# Patient Record
Sex: FEMALE | Race: WHITE | Employment: OTHER | URBAN - METROPOLITAN AREA
[De-identification: names, ages, dates, MRNs, and addresses within clinical notes are randomized per-mention and may not be internally consistent; named-entity substitution may affect disease eponyms.]

---

## 2017-02-06 ENCOUNTER — OFFICE VISIT (OUTPATIENT)
Dept: INTERNAL MEDICINE CLINIC | Age: 47
End: 2017-02-06

## 2017-02-06 VITALS
DIASTOLIC BLOOD PRESSURE: 79 MMHG | OXYGEN SATURATION: 97 % | HEART RATE: 83 BPM | SYSTOLIC BLOOD PRESSURE: 132 MMHG | WEIGHT: 146.5 LBS | RESPIRATION RATE: 14 BRPM | TEMPERATURE: 98.3 F | HEIGHT: 64 IN | BODY MASS INDEX: 25.01 KG/M2

## 2017-02-06 DIAGNOSIS — F41.1 GENERALIZED ANXIETY DISORDER: ICD-10-CM

## 2017-02-06 DIAGNOSIS — R20.0 NUMBNESS AND TINGLING IN BOTH HANDS: Primary | ICD-10-CM

## 2017-02-06 DIAGNOSIS — E03.9 HYPOTHYROIDISM, UNSPECIFIED TYPE: ICD-10-CM

## 2017-02-06 DIAGNOSIS — R20.2 NUMBNESS AND TINGLING IN BOTH HANDS: Primary | ICD-10-CM

## 2017-02-06 DIAGNOSIS — R29.898 HAND WEAKNESS: ICD-10-CM

## 2017-02-06 DIAGNOSIS — Z23 ENCOUNTER FOR IMMUNIZATION: ICD-10-CM

## 2017-02-06 RX ORDER — ALBUTEROL SULFATE 90 UG/1
1 AEROSOL, METERED RESPIRATORY (INHALATION)
Qty: 1 INHALER | Refills: 2 | Status: SHIPPED | OUTPATIENT
Start: 2017-02-06 | End: 2020-03-03 | Stop reason: SDUPTHER

## 2017-02-06 RX ORDER — BUDESONIDE AND FORMOTEROL FUMARATE DIHYDRATE 160; 4.5 UG/1; UG/1
2 AEROSOL RESPIRATORY (INHALATION) 2 TIMES DAILY
Qty: 1 INHALER | Refills: 3 | Status: SHIPPED | OUTPATIENT
Start: 2017-02-06 | End: 2017-05-02

## 2017-02-06 NOTE — PROGRESS NOTES
Reviewed record In preparation for visit and have obtained necessary documentation. Given info on advanced directives. 1. Have you been to the ER, urgent care clinic or hospitalized since your last visit? No     2. Have you seen or consulted any other health care providers outside of the [facility] since your last visit? Include any pap smears or colon screening. [facility] reviewed: After verbal order read back of , patient received Flu Shot in right arm,  UlNancy Davalos 47 [phone] Lot TR9AL Exp 6/30/17. Patient tolerated procedure without complaints and received VIS.

## 2017-02-06 NOTE — MR AVS SNAPSHOT
Visit Information Date & Time Provider Department Dept. Phone Encounter #  
 2/6/2017  9:15 AM Zoelisa BaroneBelinda [phone] [id number] Upcoming Health Maintenance Date Due INFLUENZA AGE 9 TO ADULT 8/1/2016 PAP AKA CERVICAL CYTOLOGY 5/18/2018 DTaP/Tdap/Td series (2 - Td) 8/1/2018 Allergies as of 2/6/2017  Review Complete On: 2/6/2017 By: Zoe Barone MD  
  
 Severity Noted Reaction Type Reactions Hydrocodone  03/10/2010    Hives Current Immunizations  Reviewed on 2/6/2017 Name Date H1N1 FLU VACCINE 3/10/2010 Influenza Vaccine 9/1/2014, 8/21/2013 Influenza Vaccine (Quad) PF  Incomplete Pneumococcal Vaccine (Unspecified Type) 3/10/2010 TDAP Vaccine 8/1/2008 Reviewed by Anahi Martin LPN on 8/8/5666 at  2:77 AM  
 Reviewed by Anahi Martin LPN on 5/4/5380 at  5:30 AM  
You Were Diagnosed With   
  
 Codes Comments Numbness and tingling in both hands    -  Primary ICD-10-CM: R20.2 ICD-9-CM: 782.0 Hand weakness     ICD-10-CM: M62.81 ICD-9-CM: 728.87 Encounter for immunization     ICD-10-CM: Q89 ICD-9-CM: V03.89 Vitals BP Pulse Temp Resp Height(growth percentile) Weight(growth percentile) 132/79 (BP 1 Location: Left arm, BP Patient Position: Sitting) 83 98.3 °F (36.8 °C) (Oral) 14 5' 4\" (1.626 m) 146 lb 8 oz (66.5 kg) LMP SpO2 BMI OB Status Smoking Status 01/16/2017 (Approximate) 97% 25.15 kg/m2 Having regular periods Current Every Day Smoker BMI and BSA Data Body Mass Index Body Surface Area  
 25.15 kg/m 2 1.73 m 2 Preferred Pharmacy Pharmacy Name Phone [id number] [address] [place] [address] [phone] Your Updated Medication List  
  
   
This list is accurate as of: 2/6/17  9:51 AM.  Always use your most recent med list.  
  
  
  
  
 albuterol 90 mcg/actuation inhaler Commonly known as:  PROVENTIL HFA, VENTOLIN HFA, PROAIR HFA Take 1 Puff by inhalation every four (4) hours as needed for Wheezing. BENADRYL 25 mg capsule Generic drug:  diphenhydrAMINE Take 25 mg by mouth every six (6) hours as needed. budesonide-formoterol 160-4.5 mcg/actuation HFA inhaler Commonly known as:  SYMBICORT Take 2 Puffs by inhalation two (2) times a day. CLARITIN 10 mg tablet Generic drug:  loratadine Take 10 mg by mouth daily. * levothyroxine 50 mcg tablet Commonly known as:  SYNTHROID Take 1 Tab by mouth Daily (before breakfast). Take with 200mcg for 250mcg daily * levothyroxine 200 mcg tablet Commonly known as:  SYNTHROID Take 1 Tab by mouth Daily (before breakfast). Take with 50mcg daily for total 250mcg daily MOTRIN  mg tablet Generic drug:  ibuprofen Take  by mouth. MUCINEX 600 mg ER tablet Generic drug:  guaiFENesin ER Take 600 mg by mouth two (2) times a day. PARoxetine 40 mg tablet Commonly known as:  PAXIL TAKE 1 TABLET BY MOUTH DAILY. TYLENOL 325 mg tablet Generic drug:  acetaminophen Take  by mouth every four (4) hours as needed for Pain. * Notice: This list has 2 medication(s) that are the same as other medications prescribed for you. Read the directions carefully, and ask your doctor or other care provider to review them with you. Prescriptions Sent to Pharmacy Refills  
 budesonide-formoterol (SYMBICORT) 160-4.5 mcg/actuation HFA inhaler 3 Sig: Take 2 Puffs by inhalation two (2) times a day. Class: Normal  
 Pharmacy: [facility] #[id number] [address],  [address] Ph #: [phone] Route: Inhalation  
 albuterol (PROVENTIL HFA, VENTOLIN HFA, PROAIR HFA) 90 mcg/actuation inhaler 2 Sig: Take 1 Puff by inhalation every four (4) hours as needed for Wheezing.   
 Class: Normal  
 Pharmacy: [facility] #[id number] Estephanie Bertrand, [address]  #: [phone] Route: Inhalation We Performed the Following INFLUENZA VIRUS VAC QUAD,SPLIT,PRESV FREE SYRINGE 3/> YRS IM [id number] CPT(R)] MT IMMUNIZ ADMIN,1 SINGLE/COMB VAC/TOXOID [id number] CPT(R)] To-Do List   
 02/07/2017 Neurology:  EMG TWO EXTREMITIES UPPER   
  
 02/07/2017 Neurology:  NCV SENSORY OR MIXED Referral Information Referral ID Referred By Referred To  
  
 [id number] Major Moore Not Available Visits Status Start Date End Date 1 New Request 2/6/17 2/6/18 If your referral has a status of pending review or denied, additional information will be sent to support the outcome of this decision. Referral ID Referred By Referred To  
 [id number] [facility] Not Available Visits Status Start Date End Date 1 New Request 2/6/17 2/6/18 If your referral has a status of pending review or denied, additional information will be sent to support the outcome of this decision. Patient Instructions Get EMG and nerve conduction testing. That will help us localize where the problem is and guide the next step. Vaccine Information Statement Influenza (Flu) Vaccine (Inactivated or Recombinant): What you need to know Many Vaccine Information Statements are available in Malay and other languages. See www.immunize.org/vis Hojas de Información Sobre Vacunas están disponibles en Español y en muchos otros idiomas. Visite www.immunize.org/vis 1. Why get vaccinated? Influenza (flu) is a contagious disease that spreads around the [place] every year, usually between October and May. Flu is caused by influenza viruses, and is spread mainly by coughing, sneezing, and close contact. Anyone can get flu. Flu strikes suddenly and can last several days. Symptoms vary by age, but can include: 
 fever/chills  sore throat  muscle aches  fatigue  cough  headache  runny or stuffy nose Flu can also lead to pneumonia and blood infections, and cause diarrhea and seizures in children. If you have a medical condition, such as heart or lung disease, flu can make it worse. Flu is more dangerous for some people. Infants and young children, people 72years of age and older, pregnant women, and people with certain health conditions or a weakened immune system are at greatest risk. Each year thousands of people in the [facility] die from flu, and many more are hospitalized. Flu vaccine can: 
 keep you from getting flu, 
 make flu less severe if you do get it, and 
 keep you from spreading flu to your family and other people. 2. Inactivated and recombinant flu vaccines A dose of flu vaccine is recommended every flu season. Children 6 months through 6years of age may need two doses during the same flu season. Everyone else needs only one dose each flu season. Some inactivated flu vaccines contain a very small amount of a mercury-based preservative called thimerosal. Studies have not shown thimerosal in vaccines to be harmful, but flu vaccines that do not contain thimerosal are available. There is no live flu virus in flu shots. They cannot cause the flu. There are many flu viruses, and they are always changing. Each year a new flu vaccine is made to protect against three or four viruses that are likely to cause disease in the upcoming flu season. But even when the vaccine doesnt exactly match these viruses, it may still provide some protection Flu vaccine cannot prevent: 
 flu that is caused by a virus not covered by the vaccine, or 
 illnesses that look like flu but are not. It takes about 2 weeks for protection to develop after vaccination, and protection lasts through the flu season. 3. Some people should not get this vaccine Tell the person who is giving you the vaccine:  If you have any severe, life-threatening allergies. If you ever had a life-threatening allergic reaction after a dose of flu vaccine, or have a severe allergy to any part of this vaccine, you may be advised not to get vaccinated. Most, but not all, types of flu vaccine contain a small amount of egg protein.  If you ever had Guillain-Barré Syndrome (also called GBS). Some people with a history of GBS should not get this vaccine. This should be discussed with your doctor.  If you are not feeling well. It is usually okay to get flu vaccine when you have a mild illness, but you might be asked to come back when you feel better. 4. Risks of a vaccine reaction With any medicine, including vaccines, there is a chance of reactions. These are usually mild and go away on their own, but serious reactions are also possible. Most people who get a flu shot do not have any problems with it. Minor problems following a flu shot include:  
 soreness, redness, or swelling where the shot was given  hoarseness  sore, red or itchy eyes  cough  fever  aches  headache  itching  fatigue If these problems occur, they usually begin soon after the shot and last 1 or 2 days. More serious problems following a flu shot can include the following:  There may be a small increased risk of Guillain-Barré Syndrome (GBS) after inactivated flu vaccine. This risk has been estimated at 1 or 2 additional cases per million people vaccinated. This is much lower than the risk of severe complications from flu, which can be prevented by flu vaccine.  Young children who get the flu shot along with pneumococcal vaccine (PCV13) and/or DTaP vaccine at the same time might be slightly more likely to have a seizure caused by fever. Ask your doctor for more information. Tell your doctor if a child who is getting flu vaccine has ever had a seizure. Problems that could happen after any injected vaccine:  People sometimes faint after a medical procedure, including vaccination. Sitting or lying down for about 15 minutes can help prevent fainting, and injuries caused by a fall. Tell your doctor if you feel dizzy, or have vision changes or ringing in the ears.  Some people get severe pain in the shoulder and have difficulty moving the arm where a shot was given. This happens very rarely.  Any medication can cause a severe allergic reaction. Such reactions from a vaccine are very rare, estimated at about 1 in a million doses, and would happen within a few minutes to a few hours after the vaccination. As with any medicine, there is a very remote chance of a vaccine causing a serious injury or death. The safety of vaccines is always being monitored. For more information, visit: www.cdc.gov/vaccinesafety/ 
 
 
6. The National Vaccine Injury Compensation Program 
 
The [facility] Vaccine Injury Compensation Program (VICP) is a federal program that was created to compensate people who may have been injured by certain vaccines. Persons who believe they may have been injured by a vaccine can learn about the program and about filing a claim by calling 4-264.977.9944 or visiting the [employer] website at www.[facility].gov/vaccinecompensation. There is a time limit to file a claim for compensation. 7. How can I learn more?  Ask your healthcare provider. He or she can give you the vaccine package insert or suggest other sources of information.  Call your local or state health department.  Contact the Centers for Disease Control and Prevention (CDC): 
- Call 1-668.986.8679 (6-660-FXT-INFO) or 
- Visit CDCs website at www.cdc.gov/flu Vaccine Information Statement Inactivated Influenza Vaccine 8/7/2015 
42 UNancy Newell [id number] [facility] and [employer] Centers for Disease Control and Prevention Office Use Only Introducing [facility]! Dear Shae Dillon: Thank you for requesting a [employer] account. Our records indicate that you have previously registered for a [employer] account but its currently inactive. Please call our [employer] support line at [phone]. Additional Information If you have questions, please visit the Frequently Asked Questions section of the [employer] website at [email or web address] Remember, [employer] is NOT to be used for urgent needs. For medical emergencies, dial 911. Now available from your iPhone and Android! Please provide this summary of care documentation to your next provider. Your primary care clinician is listed as Lloyd Freeman. If you have any questions after today's visit, please call [phone].

## 2017-02-06 NOTE — PATIENT INSTRUCTIONS
Get EMG and nerve conduction testing. That will help us localize where the problem is and guide the next step. Vaccine Information Statement    Influenza (Flu) Vaccine (Inactivated or Recombinant): What you need to know    Many Vaccine Information Statements are available in [place] and other languages. See www.immunize.org/vis  Hojas de Información Sobre Vacunas están disponibles en Español y en muchos otros idiomas. Visite www.immunize.org/vis    1. Why get vaccinated? Influenza (flu) is a contagious disease that spreads around the [place] every year, usually between October and May. Flu is caused by influenza viruses, and is spread mainly by coughing, sneezing, and close contact. Anyone can get flu. Flu strikes suddenly and can last several days. Symptoms vary by age, but can include:   fever/chills   sore throat   muscle aches   fatigue   cough   headache    runny or stuffy nose    Flu can also lead to pneumonia and blood infections, and cause diarrhea and seizures in children. If you have a medical condition, such as heart or lung disease, flu can make it worse. Flu is more dangerous for some people. Infants and young children, people 72years of age and older, pregnant women, and people with certain health conditions or a weakened immune system are at greatest risk. Each year thousands of people in the [facility] die from flu, and many more are hospitalized. Flu vaccine can:   keep you from getting flu,   make flu less severe if you do get it, and   keep you from spreading flu to your family and other people. 2. Inactivated and recombinant flu vaccines    A dose of flu vaccine is recommended every flu season. Children 6 months through 6years of age may need two doses during the same flu season. Everyone else needs only one dose each flu season.        Some inactivated flu vaccines contain a very small amount of a mercury-based preservative called thimerosal. Studies have not shown thimerosal in vaccines to be harmful, but flu vaccines that do not contain thimerosal are available. There is no live flu virus in flu shots. They cannot cause the flu. There are many flu viruses, and they are always changing. Each year a new flu vaccine is made to protect against three or four viruses that are likely to cause disease in the upcoming flu season. But even when the vaccine doesnt exactly match these viruses, it may still provide some protection    Flu vaccine cannot prevent:   flu that is caused by a virus not covered by the vaccine, or   illnesses that look like flu but are not. It takes about 2 weeks for protection to develop after vaccination, and protection lasts through the flu season. 3. Some people should not get this vaccine    Tell the person who is giving you the vaccine:     If you have any severe, life-threatening allergies. If you ever had a life-threatening allergic reaction after a dose of flu vaccine, or have a severe allergy to any part of this vaccine, you may be advised not to get vaccinated. Most, but not all, types of flu vaccine contain a small amount of egg protein.  If you ever had Guillain-Barré Syndrome (also called GBS). Some people with a history of GBS should not get this vaccine. This should be discussed with your doctor.  If you are not feeling well. It is usually okay to get flu vaccine when you have a mild illness, but you might be asked to come back when you feel better. 4. Risks of a vaccine reaction    With any medicine, including vaccines, there is a chance of reactions. These are usually mild and go away on their own, but serious reactions are also possible. Most people who get a flu shot do not have any problems with it.      Minor problems following a flu shot include:    soreness, redness, or swelling where the shot was given     hoarseness   sore, red or itchy eyes   cough   fever   aches   headache   itching   fatigue  If these problems occur, they usually begin soon after the shot and last 1 or 2 days. More serious problems following a flu shot can include the following:     There may be a small increased risk of Guillain-Barré Syndrome (GBS) after inactivated flu vaccine. This risk has been estimated at 1 or 2 additional cases per million people vaccinated. This is much lower than the risk of severe complications from flu, which can be prevented by flu vaccine.  Young children who get the flu shot along with pneumococcal vaccine (PCV13) and/or DTaP vaccine at the same time might be slightly more likely to have a seizure caused by fever. Ask your doctor for more information. Tell your doctor if a child who is getting flu vaccine has ever had a seizure. Problems that could happen after any injected vaccine:      People sometimes faint after a medical procedure, including vaccination. Sitting or lying down for about 15 minutes can help prevent fainting, and injuries caused by a fall. Tell your doctor if you feel dizzy, or have vision changes or ringing in the ears.  Some people get severe pain in the shoulder and have difficulty moving the arm where a shot was given. This happens very rarely.  Any medication can cause a severe allergic reaction. Such reactions from a vaccine are very rare, estimated at about 1 in a million doses, and would happen within a few minutes to a few hours after the vaccination. As with any medicine, there is a very remote chance of a vaccine causing a serious injury or death. The safety of vaccines is always being monitored. For more information, visit: www.cdc.gov/vaccinesafety/    5. What if there is a serious reaction? What should I look for?  Look for anything that concerns you, such as signs of a severe allergic reaction, very high fever, or unusual behavior.     Signs of a severe allergic reaction can include hives, swelling of the face and throat, difficulty breathing, a fast heartbeat, dizziness, and weakness - usually within a few minutes to a few hours after the vaccination. What should I do?  If you think it is a severe allergic reaction or other emergency that cant wait, call --1 and get the person to the nearest hospital. Otherwise, call your doctor.  Reactions should be reported to the Vaccine Adverse Event Reporting System (VAERS). Your doctor should file this report, or you can do it yourself through  the VAERS web site at www.vaers. [facility].gov, or by calling 0-163.929.3082. VAERS does not give medical advice. 6. The National Vaccine Injury Compensation Program    The [facility] Vaccine Injury Compensation Program (VICP) is a federal program that was created to compensate people who may have been injured by certain vaccines. Persons who believe they may have been injured by a vaccine can learn about the program and about filing a claim by calling 4-432.887.4857 or visiting the [employer] website at www.[employer].gov/vaccinecompensation. There is a time limit to file a claim for compensation. 7. How can I learn more?  Ask your healthcare provider. He or she can give you the vaccine package insert or suggest other sources of information.  Call your local or state health department.  Contact the Centers for Disease Control and Prevention (CDC):  - Call 6-609.990.8586 (1-800-CDC-INFO) or  - Visit CDCs website at www.cdc.gov/flu    Vaccine Information Statement   Inactivated Influenza Vaccine   2015  42 PAT Pichardo [id number]    Department of Health and Human Services  Centers for Disease Control and Prevention    Office Use Only

## 2017-02-06 NOTE — PROGRESS NOTES
HISTORY OF PRESENT ILLNESS  David Webb is a 52 y.o. female. HPI She complains of a 4-5 week history of tingling and burning in her fingers and hands but not thumbs. The burning and tingling are constant. She has decreased sensation in her fingers and hands. She finds it hard to hold onto things and to open bottle tops. She complains that her hands are sensitive to water and that it does not matter whether the water is warm or cool. She also has the burning and tingling in the ulnar aspect of the left forearm. She denies any other arm pain. She denies any pain or abnormal sensation in her legs or trunk  She has had pain in her neck for the past 2 years. The pain is worse on the right side of the neck than it is on the left there is been no history of injury. She sees a chiropractor intermittently who has told her that her neck curve is abnormal.  She does get some temporary relief of the pain after he treats her. She has history of hypothyroidism and is on an unusually high dose of levothyroxine especially taking into account that she is not significantly overweight. Currently she takes 250 µg daily. She has a history of generalized anxiety disorder as well. She takes paroxetine 40 mg to treat this.   Patient Active Problem List   Diagnosis Code    Hypothyroidism E03.9    Generalized anxiety disorder F41.1    COPD (chronic obstructive pulmonary disease) ([facility] J44.9    Tobacco use Z72.0     Past Medical History   Diagnosis Date    Allergic rhinitis     COPD (chronic obstructive pulmonary disease) ([facility]      mild    Generalized anxiety disorder     Hypothyroidism      Past Surgical History   Procedure Laterality Date    Hx tubal ligation       Social History     Social History    Marital status:      Spouse name: N/A    Number of children: N/A    Years of education: N/A     Social History Main Topics    Smoking status: Current Every Day Smoker     Packs/day: 1.50     Years: 20.00 Types: Cigarettes    Smokeless tobacco: Never Used      Comment: about 1ppd now 6/2/16    Alcohol use No    Drug use: None    Sexual activity: Yes     Other Topics Concern    None     Social History Narrative     Family History   Problem Relation Age of Onset    Diabetes Mother     Hypertension Mother     Heart Disease Father      Allergies   Allergen Reactions    Hydrocodone Hives     Current Outpatient Prescriptions   Medication Sig Dispense Refill    PARoxetine (PAXIL) 40 mg tablet TAKE 1 TABLET BY MOUTH DAILY. 30 Tab 2    levothyroxine (SYNTHROID) 200 mcg tablet Take 1 Tab by mouth Daily (before breakfast). Take with 50mcg daily for total 250mcg daily 30 Tab 3    levothyroxine (SYNTHROID) 50 mcg tablet Take 1 Tab by mouth Daily (before breakfast). Take with 200mcg for 250mcg daily 30 Tab 2    guaiFENesin ER (MUCINEX) 600 mg ER tablet Take 600 mg by mouth two (2) times a day.  ibuprofen (MOTRIN IB) 200 mg tablet Take  by mouth.  diphenhydrAMINE (BENADRYL) 25 mg capsule Take 25 mg by mouth every six (6) hours as needed.  acetaminophen (TYLENOL) 325 mg tablet Take  by mouth every four (4) hours as needed for Pain.  budesonide-formoterol (SYMBICORT) 160-4.5 mcg/actuation HFA inhaler Take 2 Puffs by inhalation two (2) times a day. 1 Inhaler 3    albuterol (PROVENTIL HFA, VENTOLIN HFA) 90 mcg/Actuation inhaler Take 1 Puff by inhalation.  loratadine (CLARITIN) 10 mg tablet Take 10 mg by mouth daily. ROS  Visit Vitals    /79 (BP 1 Location: Left arm, BP Patient Position: Sitting)    Pulse 83    Temp 98.3 °F (36.8 °C) (Oral)    Resp 14    Ht 5' 4\" (1.626 m)    Wt 146 lb 8 oz (66.5 kg)    LMP 01/16/2017 (Approximate)    SpO2 97%    BMI 25.15 kg/m2     Physical Exam   Constitutional: She is oriented to person, place, and time. She appears well-developed and well-nourished. HENT:   Head: Normocephalic and atraumatic. Neck: Neck supple. Musculoskeletal:        Cervical back: She exhibits normal range of motion, no tenderness, no bony tenderness, no deformity and no spasm. Right hand: She exhibits normal range of motion, no tenderness, no bony tenderness, normal capillary refill, no deformity and no swelling. Normal sensation noted. Decreased strength noted. She exhibits finger abduction and wrist extension trouble. She exhibits no thumb/finger opposition. Left hand: She exhibits normal range of motion, no tenderness, no bony tenderness, normal capillary refill, no deformity and no swelling. Normal sensation noted. Decreased strength noted. She exhibits finger abduction and wrist extension trouble. She exhibits no thumb/finger opposition. Neurological: She is alert and oriented to person, place, and time. Right and left: strength is 2-3/5 to finger flexion/extension, abduction, wrist flexion/extension, elbow flexion extension, all with questionable effort. 5/5 to apposition. Skin: Skin is warm, dry and intact. Psychiatric: She has a normal mood and affect. Her behavior is normal.   Nursing note and vitals reviewed. ASSESSMENT and PLAN    ICD-10-CM ICD-9-CM    1. Numbness and tingling in both hands R20.2 782.0 EMG TWO EXTREMITIES UPPER      NCV SENSORY OR MIXED   2. Hand weakness M62.81 728.87 EMG TWO EXTREMITIES UPPER      NCV SENSORY OR MIXED   3. Generalized anxiety disorder F41.1 300.02    4. Hypothyroidism, unspecified type E03.9 244.9    5. Encounter for immunization Z23 V03.89 [place] IMMUNIZ ADMIN,1 SINGLE/COMB VAC/TOXOID      INFLUENZA VIRUS VAC QUAD,SPLIT,PRESV FREE SYRINGE 3/> YRS IM         Numbness and tingling in both hands  While I doubt this represents a radiculopathy or a peripheral neuropathy, will obtain EMG nerve conduction to exclude both. The other thoughts might be that her symptoms are anxiety induced, or an early manifestation of a neurologic disorder such as multiple sclerosis.   If symptoms persist, remit and relapse, or she develops symptoms and another area of the nervous system, MRI of at least the cervical spine is indicated. -     EMG TWO EXTREMITIES UPPER; Future  -     NCV SENSORY OR MIXED; Future    Hand weakness  -     EMG TWO EXTREMITIES UPPER; Future  -     NCV SENSORY OR MIXED; Future    Generalized anxiety disorder    Hypothyroidism, unspecified type  I have to wonder if she misses doses of thyroid hormone explaining the relatively high dose of replacement that she requires. In fact in August 2014 her T4 level was normal in the face of a high TSH, suggesting missed doses. .  TSH has even been normal at times in 2011 and 2012 only to rebound to higher values when rechecked. .    Encounter for immunization  -     [place] IMMUNIZ ADMIN,1 SINGLE/COMB VAC/TOXOID  -     Influenza virus vaccine (QUADRIVALENT PRES FREE SYRINGE) IM 3 years and older    Other orders  -     budesonide-formoterol (SYMBICORT) 160-4.5 mcg/actuation HFA inhaler; Take 2 Puffs by inhalation two (2) times a day. -     albuterol (PROVENTIL HFA, VENTOLIN HFA, PROAIR HFA) 90 mcg/actuation inhaler; Take 1 Puff by inhalation every four (4) hours as needed for Wheezing. Follow-up Disposition: Not on File   Results of EMG nerve conduction tests will help guide the next step in evaluation. Whether positive or negative she may warrant an MRI of the C-spine. I have discussed the diagnosis with the patient and the intended plan as seen in the above orders. Patient is in agreement. The patient has received an after-visit summary and questions were answered concerning future plans. I have discussed medication side effects and warnings with the patient as well.

## 2017-03-27 ENCOUNTER — OFFICE VISIT (OUTPATIENT)
Dept: INTERNAL MEDICINE CLINIC | Age: 47
End: 2017-03-27

## 2017-03-27 VITALS
TEMPERATURE: 98.4 F | DIASTOLIC BLOOD PRESSURE: 78 MMHG | WEIGHT: 146.8 LBS | OXYGEN SATURATION: 98 % | HEART RATE: 74 BPM | HEIGHT: 64 IN | RESPIRATION RATE: 16 BRPM | SYSTOLIC BLOOD PRESSURE: 125 MMHG | BODY MASS INDEX: 25.06 KG/M2

## 2017-03-27 DIAGNOSIS — M79.641 PAIN IN BOTH HANDS: ICD-10-CM

## 2017-03-27 DIAGNOSIS — R20.0 NUMBNESS IN BOTH HANDS: ICD-10-CM

## 2017-03-27 DIAGNOSIS — M54.2 NECK PAIN: ICD-10-CM

## 2017-03-27 DIAGNOSIS — M79.642 PAIN IN BOTH HANDS: ICD-10-CM

## 2017-03-27 DIAGNOSIS — E03.9 HYPOTHYROIDISM, UNSPECIFIED TYPE: Primary | ICD-10-CM

## 2017-03-27 DIAGNOSIS — M62.542: ICD-10-CM

## 2017-03-27 DIAGNOSIS — R29.898 HAND WEAKNESS: ICD-10-CM

## 2017-03-27 DIAGNOSIS — M62.541: ICD-10-CM

## 2017-03-27 NOTE — PATIENT INSTRUCTIONS

## 2017-03-27 NOTE — PROGRESS NOTES
Patient received paperwork for advance directive during previous visit but has not completed at this time     Reviewed record In preparation for visit and have obtained necessary documentation      1. Have you been to the ER, urgent care clinic since your last visit? Hospitalized since your last visit? NO    2. Have you seen or consulted any other health care providers outside of the [address] [place] [address] since your last visit? Include any pap smears or colon screening.  NO

## 2017-03-27 NOTE — MR AVS SNAPSHOT
Visit Information Date & Time Provider Department Dept. Phone Encounter #  
 3/27/2017  3:45 PM Ulises Mandujano MD Michelle Ortiz [phone] [id number] Follow-up Instructions Return in about 2 months (around 5/27/2017) for thyroid. Upcoming Health Maintenance Date Due  
 PAP AKA CERVICAL CYTOLOGY 5/18/2018 DTaP/Tdap/Td series (2 - Td) 8/1/2018 Allergies as of 3/27/2017  Review Complete On: 3/27/2017 By: Ulises Mandujano MD  
  
 Severity Noted Reaction Type Reactions Hydrocodone  03/10/2010    Hives Current Immunizations  Reviewed on 2/6/2017 Name Date H1N1 FLU VACCINE 3/10/2010 Influenza Vaccine 9/1/2014, 8/21/2013 Influenza Vaccine (Quad) PF 2/6/2017 Pneumococcal Vaccine (Unspecified Type) 3/10/2010 TDAP Vaccine 8/1/2008 Not reviewed this visit You Were Diagnosed With   
  
 Codes Comments Hypothyroidism, unspecified type    -  Primary ICD-10-CM: E03.9 ICD-9-CM: 244.9 Pain in both hands     ICD-10-CM: M79.641, A87.920 ICD-9-CM: 729.5 Numbness in both hands     ICD-10-CM: R20.0 ICD-9-CM: 782.0 Neck pain     ICD-10-CM: M54.2 ICD-9-CM: 723.1 Hypothenar muscle atrophy, left     ICD-10-CM: R30.494 ICD-9-CM: 728.2 Hypothenar muscle atrophy, right     ICD-10-CM: M62.541 ICD-9-CM: 728.2 Hand weakness     ICD-10-CM: M62.81 ICD-9-CM: 728.87 Vitals BP Pulse Temp Resp Height(growth percentile) Weight(growth percentile) 125/78 (BP 1 Location: Right arm, BP Patient Position: Sitting) 74 98.4 °F (36.9 °C) (Oral) 16 5' 4\" (1.626 m) 146 lb 12.8 oz (66.6 kg) LMP SpO2 BMI OB Status Smoking Status 01/31/2017 (Approximate) 98% 25.2 kg/m2 Having regular periods Current Every Day Smoker Vitals History BMI and BSA Data Body Mass Index Body Surface Area  
 25.2 kg/m 2 1.73 m 2 Preferred Pharmacy Pharmacy Name Phone [id number] Eri Daniels [address] [address] [phone] Your Updated Medication List  
  
   
This list is accurate as of: 3/27/17  4:32 PM.  Always use your most recent med list.  
  
  
  
  
 albuterol 90 mcg/actuation inhaler Commonly known as:  PROVENTIL HFA, VENTOLIN HFA, PROAIR HFA Take 1 Puff by inhalation every four (4) hours as needed for Wheezing. BENADRYL 25 mg capsule Generic drug:  diphenhydrAMINE Take 25 mg by mouth every six (6) hours as needed. budesonide-formoterol 160-4.5 mcg/actuation HFA inhaler Commonly known as:  SYMBICORT Take 2 Puffs by inhalation two (2) times a day. CLARITIN 10 mg tablet Generic drug:  loratadine Take 10 mg by mouth as needed. levothyroxine 200 mcg tablet Commonly known as:  SYNTHROID Take 1 Tab by mouth Daily (before breakfast). Take with 50mcg daily for total 250mcg daily MOTRIN  mg tablet Generic drug:  ibuprofen Take 200 mg by mouth as needed. MUCINEX 600 mg ER tablet Generic drug:  guaiFENesin ER Take 600 mg by mouth as needed. PARoxetine 40 mg tablet Commonly known as:  PAXIL TAKE 1 TABLET BY MOUTH DAILY. TYLENOL 325 mg tablet Generic drug:  acetaminophen Take  by mouth every four (4) hours as needed for Pain. We Performed the Following TSH 3RD GENERATION [72333 CPT(R)] Follow-up Instructions Return in about 2 months (around 5/27/2017) for thyroid. To-Do List   
 03/27/2017 Imaging:  MRI CERV SPINE WO CONT   
  
 03/27/2017 Imaging:  XR SPINE CERV 4 OR 5 V Referral Information Referral ID Referred By Referred To  
  
 [id number] Jerica Velasquez Not Available Visits Status Start Date End Date 1 New Request 3/27/17 3/27/18 If your referral has a status of pending review or denied, additional information will be sent to support the outcome of this decision. Patient Instructions A Healthy Lifestyle: Care Instructions Your Care Instructions A healthy lifestyle can help you feel good, stay at a healthy weight, and have plenty of energy for both work and play. A healthy lifestyle is something you can share with your whole family. A healthy lifestyle also can lower your risk for serious health problems, such as high blood pressure, heart disease, and diabetes. You can follow a few steps listed below to improve your health and the health of your family. Follow-up care is a key part of your treatment and safety. Be sure to make and go to all appointments, and call your doctor if you are having problems. Its also a good idea to know your test results and keep a list of the medicines you take. How can you care for yourself at home? · Do not eat too much sugar, fat, or fast foods. You can still have dessert and treats now and then. The goal is moderation. · Start small to improve your eating habits. Pay attention to portion sizes, drink less juice and soda pop, and eat more fruits and vegetables. ¨ Eat a healthy amount of food. A 3-ounce serving of meat, for example, is about the size of a deck of cards. Fill the rest of your plate with vegetables and whole grains. ¨ Limit the amount of soda and sports drinks you have every day. Drink more water when you are thirsty. ¨ Eat at least 5 servings of fruits and vegetables every day. It may seem like a lot, but it is not hard to reach this goal. A serving or helping is 1 piece of fruit, 1 cup of vegetables, or 2 cups of leafy, raw vegetables. Have an apple or some carrot sticks as an afternoon snack instead of a candy bar. Try to have fruits and/or vegetables at every meal. 
· Make exercise part of your daily routine. You may want to start with simple activities, such as walking, bicycling, or slow swimming. Try to be active 30 to 60 minutes every day.  You do not need to do all 30 to 60 minutes all at once. For example, you can exercise 3 times a day for 10 or 20 minutes. Moderate exercise is safe for most people, but it is always a good idea to talk to your doctor before starting an exercise program. 
· Keep moving. Lu Parody the lawn, work in the garden, or NOZA. Take the stairs instead of the elevator at work. · If you smoke, quit. People who smoke have an increased risk for heart attack, stroke, cancer, and other lung illnesses. Quitting is hard, but there are ways to boost your chance of quitting tobacco for good. ¨ Use nicotine gum, patches, or lozenges. ¨ Ask your doctor about stop-smoking programs and medicines. ¨ Keep trying. In addition to reducing your risk of diseases in the future, you will notice some benefits soon after you stop using tobacco. If you have shortness of breath or asthma symptoms, they will likely get better within a few weeks after you quit. · Limit how much alcohol you drink. Moderate amounts of alcohol (up to 2 drinks a day for men, 1 drink a day for women) are okay. But drinking too much can lead to liver problems, high blood pressure, and other health problems. Family health If you have a family, there are many things you can do together to improve your health. · Eat meals together as a family as often as possible. · Eat healthy foods. This includes fruits, vegetables, lean meats and dairy, and whole grains. · Include your family in your fitness plan. Most people think of activities such as jogging or tennis as the way to fitness, but there are many ways you and your family can be more active. Anything that makes you breathe hard and gets your heart pumping is exercise. Here are some tips: 
¨ Walk to do errands or to take your child to school or the bus. ¨ Go for a family bike ride after dinner instead of watching TV. Where can you learn more? Go to http://steffi-magdalena.info/. Enter E864 in the search box to learn more about \"A Healthy Lifestyle: Care Instructions. \" Current as of: July 26, 2016 Content Version: 11.1 © 1078-5115 [employer] ([employer]), [employer]. Care instructions adapted under license by [employer] (which disclaims liability or warranty for this information). If you have questions about a medical condition or this instruction, always ask your healthcare professional. [address] any warranty or liability for your use of this information. Introducing [facility]! Dear Cesar Baldwin: Thank you for requesting a Legal[employer] account. Our records indicate that you have previously registered for a Legal[employer] account but its currently inactive. Please call our [employer] support line at [phone]. Additional Information If you have questions, please visit the Frequently Asked Questions section of the [employer] website at [email or web address] Paradial/[employer]/. Remember, Legal[employer]ump is NOT to be used for urgent needs. For medical emergencies, dial 911. Now available from your iPhone and Android! Please provide this summary of care documentation to your next provider. Your primary care clinician is listed as Lloyd Freeman. If you have any questions after today's visit, please call [phone].

## 2017-03-27 NOTE — PROGRESS NOTES
HPI  Ms. Merced Avendaño is a 52y.o. year old female, she is seen today for progressive hand numbness. Complains of mild tingling thumbs, 2nd fingers and fingers 3-5 past wrist completely numb, now starting to have pain. Throbbing pain in the evening. Also c/o neck pain, feels \"locked at times\" worse on right side. Has been seeing chiropractor for neck pain, only helps temporaily. Hands are getting weaker, josue fingers 3-5. Warm water is painful to hand, cold water causes a deep pain. 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 Right foot has mild tingle intermittently, no numbness. Frequently misses doses of levothyroxine but can't tell me how often she misses doses. Is taking levothyroxine 200mcg only, not 50mcg. Had EMG/NCS b/l UE 2/14/17 - no evidence of peripheral nerve deficits, rec for consideration of cervical spine MRI as next step of workup. Chief Complaint   Patient presents with    Numbness     Room 1// NON fasting        Prior to Admission medications    Medication Sig Start Date End Date Taking? Authorizing Provider   albuterol (PROVENTIL HFA, VENTOLIN HFA, PROAIR HFA) 90 mcg/actuation inhaler Take 1 Puff by inhalation every four (4) hours as needed for Wheezing. 2/6/17  Yes Isac Brown MD   PARoxetine (PAXIL) 40 mg tablet TAKE 1 TABLET BY MOUTH DAILY. 8/21/16  Yes Chantel Zaidi MD   levothyroxine (SYNTHROID) 200 mcg tablet Take 1 Tab by mouth Daily (before breakfast). Take with 50mcg daily for total 250mcg daily 7/3/16  Yes Chantel Zaidi MD   levothyroxine (SYNTHROID) 50 mcg tablet Take 1 Tab by mouth Daily (before breakfast). Take with 200mcg for 250mcg daily 6/3/16  Yes Chantel Zaidi MD   guaiFENesin ER [facility]) 600 mg ER tablet Take 600 mg by mouth as needed. Yes Historical Provider   ibuprofen (MOTRIN IB) 200 mg tablet Take 200 mg by mouth as needed.    Yes Historical Provider   diphenhydrAMINE (BENADRYL) 25 mg capsule Take 25 mg by mouth every six (6) hours as needed. Yes Historical Provider   acetaminophen (TYLENOL) 325 mg tablet Take  by mouth every four (4) hours as needed for Pain. Yes Historical Provider   loratadine (CLARITIN) 10 mg tablet Take 10 mg by mouth as needed. Yes Historical Provider   budesonide-formoterol (SYMBICORT) 160-4.5 mcg/actuation HFA inhaler Take 2 Puffs by inhalation two (2) times a day. 2/6/17   Brannon Beaulieu MD         Allergies   Allergen Reactions    Hydrocodone Hives         REVIEW OF SYSTEMS:  Per HPI    PHYSICAL EXAM:  Visit Vitals    /78 (BP 1 Location: Right arm, BP Patient Position: Sitting)    Pulse 74    Temp 98.4 °F (36.9 °C) (Oral)    Resp 16    Ht 5' 4\" (1.626 m)    Wt 146 lb 12.8 oz (66.6 kg)    LMP 01/31/2017 (Approximate)    SpO2 98%    BMI 25.2 kg/m2     Constitutional: Appears well-developed and well-nourished. No distress. HENT:   Head: Normocephalic and atraumatic. Eyes: No scleral icterus. Neck: decreased ROM extension, lateral movements with mild pain on palpation lower cervical spine  Cardiovascular: Normal S1/S2, regular rhythm. No murmurs, rubs, or gallops. Pulmonary/Chest: Effort normal and breath sounds normal. No respiratory distress. No wheezes, rhonchi, or rales. Neurological: Alert. Strength 5/5 b/l ue other than 3/5 fingers 4-5 both hands  testing, decreased sensation to light touch fingers 3-5 and palm b/l as well as tips of fingers 1-2.  +atrophy of hypothenar muscles R>L hands  Psychiatric: Normal mood and affect.  Behavior is normal.     Lab Results   Component Value Date/Time    Sodium 138 06/02/2016 11:07 AM    Potassium 4.8 06/02/2016 11:07 AM    Chloride 98 06/02/2016 11:07 AM    CO2 27 06/02/2016 11:07 AM    Anion gap 6 05/20/2009 01:50 PM    Glucose 83 06/02/2016 11:07 AM    BUN 10 06/02/2016 11:07 AM    Creatinine 0.72 06/02/2016 11:07 AM    BUN/Creatinine ratio 14 06/02/2016 11:07 AM    GFR est  06/02/2016 11:07 AM    GFR est non- 06/02/2016 11:07 AM    Calcium 9.3 06/02/2016 11:07 AM    Bilirubin, total 0.6 06/02/2016 11:07 AM    AST (SGOT) 17 06/02/2016 11:07 AM    Alk. phosphatase 64 06/02/2016 11:07 AM    Protein, total 7.3 06/02/2016 11:07 AM    Albumin 4.8 06/02/2016 11:07 AM    Globulin 2.9 05/20/2009 01:50 PM    A-G Ratio 1.9 06/02/2016 11:07 AM    ALT (SGPT) 7 06/02/2016 11:07 AM     No results found for: HBA1C, HGBE8, [id number]   No results found for: CHOL, CHOLPOCT, CHOLX, CHLST, CHOLV, HDL, HDLPOC, LDL, LDLCPOC, NLDLCT, DLDL, LDLC, DLDLP, VLDLC, VLDL, TGL, TGLX, TRIGL, [id number], TRIGP, TGLPOCT, CHHD, CHHDX       ASSESSMENT/PLAN  King Smith was seen today for numbness. Diagnoses and all orders for this visit:    Hypothyroidism, unspecified type  -     TSH 3RD GENERATION    Pain in both hands  -     XR SPINE CERV 4 OR 5 V; Future  -     MRI CERV SPINE WO CONT; Future    Numbness in both hands  -     XR SPINE CERV 4 OR 5 V; Future  -     MRI CERV SPINE WO CONT; Future    Neck pain  -     XR SPINE CERV 4 OR 5 V; Future  -     MRI CERV SPINE WO CONT; Future    Hypothenar muscle atrophy, left  -     MRI CERV SPINE WO CONT; Future    Hypothenar muscle atrophy, right    Hand weakness  -     MRI CERV SPINE WO CONT; Future    EMG/NCS b/l UE 2/14/17 - no evidence of peripheral nerve deficits, rec for consideration of cervical spine MRI as next step of workup - progressive numbness, loss of sensation to heat and cold as well as atrophy and pain with hand weakness. Needs MRI. Will refer to specialist depending on results - nsgy v. neurolgy. Rec compliance with levothyroxine 200mcg daily, recheck tsh in 2 mos. There are no preventive care reminders to display for this patient. Follow-up Disposition:  Return in about 2 months (around 5/27/2017) for thyroid. Reviewed plan of care. Patient has provided input and agrees with goals.      The nurse provided the patient and/or family with advanced directive information if needed and encouraged the patient to provide a copy to the office when available.

## 2017-03-29 LAB — TSH SERPL DL<=0.005 MIU/L-ACNC: 17.64 UIU/ML (ref 0.45–4.5)

## 2017-03-29 NOTE — PROGRESS NOTES
Tell her to make sure she takes levothyroxine daily - tsh high but need to take it daily until follow up before changing doses.

## 2017-04-03 ENCOUNTER — HOSPITAL ENCOUNTER (OUTPATIENT)
Dept: MRI IMAGING | Age: 47
Discharge: HOME | End: 2017-04-03
Attending: INTERNAL MEDICINE
Payer: COMMERCIAL

## 2017-04-03 DIAGNOSIS — R20.0 NUMBNESS IN BOTH HANDS: ICD-10-CM

## 2017-04-03 DIAGNOSIS — M79.641 PAIN IN BOTH HANDS: ICD-10-CM

## 2017-04-03 DIAGNOSIS — M54.2 NECK PAIN: ICD-10-CM

## 2017-04-03 DIAGNOSIS — R29.898 HAND WEAKNESS: ICD-10-CM

## 2017-04-03 DIAGNOSIS — M79.642 PAIN IN BOTH HANDS: ICD-10-CM

## 2017-04-03 DIAGNOSIS — M62.542: ICD-10-CM

## 2017-04-03 PROCEDURE — 72141 MRI NECK SPINE W/O DYE: CPT

## 2017-04-04 DIAGNOSIS — M48.02 SPINAL STENOSIS OF CERVICAL REGION: Primary | ICD-10-CM

## 2017-04-04 DIAGNOSIS — M62.541 ATROPHY OF MUSCLE OF RIGHT HAND: ICD-10-CM

## 2017-04-04 DIAGNOSIS — R20.0 NUMBNESS IN BOTH HANDS: ICD-10-CM

## 2017-04-27 ENCOUNTER — DOCUMENTATION ONLY (OUTPATIENT)
Dept: INTERNAL MEDICINE CLINIC | Age: 47
End: 2017-04-27

## 2017-05-02 ENCOUNTER — HOSPITAL ENCOUNTER (OUTPATIENT)
Dept: GENERAL RADIOLOGY | Age: 47
Discharge: HOME | End: 2017-05-02
Attending: ORTHOPAEDIC SURGERY
Payer: COMMERCIAL

## 2017-05-02 ENCOUNTER — HOSPITAL ENCOUNTER (OUTPATIENT)
Dept: PREADMISSION TESTING | Age: 47
Discharge: HOME | End: 2017-05-02
Attending: ORTHOPAEDIC SURGERY
Payer: COMMERCIAL

## 2017-05-02 VITALS
DIASTOLIC BLOOD PRESSURE: 81 MMHG | WEIGHT: 143.74 LBS | SYSTOLIC BLOOD PRESSURE: 132 MMHG | TEMPERATURE: 98.3 F | HEART RATE: 65 BPM | OXYGEN SATURATION: 99 % | HEIGHT: 64 IN | BODY MASS INDEX: 24.54 KG/M2 | RESPIRATION RATE: 20 BRPM

## 2017-05-02 LAB
25(OH)D3 SERPL-MCNC: 15.8 NG/ML (ref 30–100)
ABO + RH BLD: NORMAL
ALBUMIN SERPL BCP-MCNC: 3.6 G/DL (ref 3.5–5)
ALBUMIN/GLOB SERPL: 1.1 {RATIO} (ref 1.1–2.2)
ALP SERPL-CCNC: 66 U/L (ref 45–117)
ALT SERPL-CCNC: 16 U/L (ref 12–78)
ANION GAP BLD CALC-SCNC: 9 MMOL/L (ref 5–15)
APPEARANCE UR: CLEAR
AST SERPL W P-5'-P-CCNC: 13 U/L (ref 15–37)
ATRIAL RATE: 67 BPM
BACTERIA URNS QL MICRO: NEGATIVE /HPF
BILIRUB SERPL-MCNC: 0.5 MG/DL (ref 0.2–1)
BILIRUB UR QL: NEGATIVE
BLOOD GROUP ANTIBODIES SERPL: NORMAL
BUN SERPL-MCNC: 7 MG/DL (ref 6–20)
BUN/CREAT SERPL: 11 (ref 12–20)
CALCIUM SERPL-MCNC: 8.3 MG/DL (ref 8.5–10.1)
CALCULATED P AXIS, ECG09: 76 DEGREES
CALCULATED R AXIS, ECG10: 92 DEGREES
CALCULATED T AXIS, ECG11: 64 DEGREES
CHLORIDE SERPL-SCNC: 104 MMOL/L (ref 97–108)
CO2 SERPL-SCNC: 26 MMOL/L (ref 21–32)
COLOR UR: ABNORMAL
CREAT SERPL-MCNC: 0.61 MG/DL (ref 0.55–1.02)
DIAGNOSIS, 93000: NORMAL
EPITH CASTS URNS QL MICRO: ABNORMAL /LPF
ERYTHROCYTE [DISTWIDTH] IN BLOOD BY AUTOMATED COUNT: 13 % (ref 11.5–14.5)
EST. AVERAGE GLUCOSE BLD GHB EST-MCNC: 111 MG/DL
GLOBULIN SER CALC-MCNC: 3.3 G/DL (ref 2–4)
GLUCOSE SERPL-MCNC: 74 MG/DL (ref 65–100)
GLUCOSE UR STRIP.AUTO-MCNC: NEGATIVE MG/DL
HBA1C MFR BLD: 5.5 % (ref 4.2–6.3)
HCT VFR BLD AUTO: 40 % (ref 35–47)
HGB BLD-MCNC: 13.6 G/DL (ref 11.5–16)
HGB UR QL STRIP: NEGATIVE
INR PPP: 1 (ref 0.9–1.1)
KETONES UR QL STRIP.AUTO: NEGATIVE MG/DL
LEUKOCYTE ESTERASE UR QL STRIP.AUTO: NEGATIVE
MCH RBC QN AUTO: 32.9 PG (ref 26–34)
MCHC RBC AUTO-ENTMCNC: 34 G/DL (ref 30–36.5)
MCV RBC AUTO: 96.9 FL (ref 80–99)
MUCOUS THREADS URNS QL MICRO: ABNORMAL /LPF
NITRITE UR QL STRIP.AUTO: NEGATIVE
P-R INTERVAL, ECG05: 140 MS
PH UR STRIP: 6.5 [PH] (ref 5–8)
PLATELET # BLD AUTO: 223 K/UL (ref 150–400)
POTASSIUM SERPL-SCNC: 3.9 MMOL/L (ref 3.5–5.1)
PREALB SERPL-MCNC: 20.9 MG/DL (ref 20–40)
PROT SERPL-MCNC: 6.9 G/DL (ref 6.4–8.2)
PROT UR STRIP-MCNC: NEGATIVE MG/DL
PROTHROMBIN TIME: 10 SEC (ref 9–11.1)
Q-T INTERVAL, ECG07: 432 MS
QRS DURATION, ECG06: 74 MS
QTC CALCULATION (BEZET), ECG08: 456 MS
RBC # BLD AUTO: 4.13 M/UL (ref 3.8–5.2)
RBC #/AREA URNS HPF: ABNORMAL /HPF (ref 0–5)
SODIUM SERPL-SCNC: 139 MMOL/L (ref 136–145)
SP GR UR REFRACTOMETRY: 1.01 (ref 1–1.03)
SPECIMEN EXP DATE BLD: NORMAL
UA: UC IF INDICATED,UAUC: ABNORMAL
UROBILINOGEN UR QL STRIP.AUTO: 0.2 EU/DL (ref 0.2–1)
VENTRICULAR RATE, ECG03: 67 BPM
WBC # BLD AUTO: 4.7 K/UL (ref 3.6–11)
WBC URNS QL MICRO: ABNORMAL /HPF (ref 0–4)

## 2017-05-02 PROCEDURE — 85027 COMPLETE CBC AUTOMATED: CPT | Performed by: ORTHOPAEDIC SURGERY

## 2017-05-02 PROCEDURE — 82306 VITAMIN D 25 HYDROXY: CPT | Performed by: ORTHOPAEDIC SURGERY

## 2017-05-02 PROCEDURE — 85610 PROTHROMBIN TIME: CPT | Performed by: ORTHOPAEDIC SURGERY

## 2017-05-02 PROCEDURE — 80053 COMPREHEN METABOLIC PANEL: CPT | Performed by: ORTHOPAEDIC SURGERY

## 2017-05-02 PROCEDURE — 93005 ELECTROCARDIOGRAM TRACING: CPT

## 2017-05-02 PROCEDURE — 83036 HEMOGLOBIN GLYCOSYLATED A1C: CPT | Performed by: ORTHOPAEDIC SURGERY

## 2017-05-02 PROCEDURE — 36415 COLL VENOUS BLD VENIPUNCTURE: CPT | Performed by: ORTHOPAEDIC SURGERY

## 2017-05-02 PROCEDURE — 81001 URINALYSIS AUTO W/SCOPE: CPT | Performed by: ORTHOPAEDIC SURGERY

## 2017-05-02 PROCEDURE — 71020 XR CHEST PA LAT: CPT

## 2017-05-02 PROCEDURE — 86900 BLOOD TYPING SEROLOGIC ABO: CPT | Performed by: ORTHOPAEDIC SURGERY

## 2017-05-02 PROCEDURE — 84134 ASSAY OF PREALBUMIN: CPT | Performed by: ORTHOPAEDIC SURGERY

## 2017-05-02 RX ORDER — SODIUM CHLORIDE, SODIUM LACTATE, POTASSIUM CHLORIDE, CALCIUM CHLORIDE 600; 310; 30; 20 MG/100ML; MG/100ML; MG/100ML; MG/100ML
25 INJECTION, SOLUTION INTRAVENOUS CONTINUOUS
Status: CANCELLED | OUTPATIENT
Start: 2017-05-15

## 2017-05-02 RX ORDER — PREGABALIN 150 MG/1
150 CAPSULE ORAL ONCE
Status: CANCELLED | OUTPATIENT
Start: 2017-05-15 | End: 2017-05-15

## 2017-05-02 RX ORDER — CEFAZOLIN SODIUM IN 0.9 % NACL 2 G/100 ML
2 PLASTIC BAG, INJECTION (ML) INTRAVENOUS ONCE
Status: CANCELLED | OUTPATIENT
Start: 2017-05-15 | End: 2017-05-15

## 2017-05-02 RX ORDER — ACETAMINOPHEN 500 MG
1000 TABLET ORAL ONCE
Status: CANCELLED | OUTPATIENT
Start: 2017-05-15 | End: 2017-05-15

## 2017-05-02 RX ORDER — ALBUTEROL SULFATE 0.83 MG/ML
SOLUTION RESPIRATORY (INHALATION) AS NEEDED
COMMUNITY
End: 2018-10-29 | Stop reason: SDUPTHER

## 2017-05-02 NOTE — PERIOP NOTES
[facility]  Preoperative Instructions        Surgery Date 05/15/17         Time of Arrival 1030  Contact # [phone] cell    1. On the day of your surgery, please report to the Surgical Services Registration Desk and sign in at your designated time. The Surgery Center is located to the right of the Emergency Room. 2. You must have someone with you to drive you home. You should not drive a car for 24 hours following surgery. Please make arrangements for a friend or family member to stay with you for the first 24 hours after your surgery. 3. Do not have anything to eat or drink (including water, gum, mints, coffee, juice) after midnight       . This may not apply to medications prescribed by your physician. Please note special instructions, if applicable. If you are currently taking Plavix, Coumadin, or other blood-thinning agents, contact your surgeon for instructions. 4. We recommend you do not drink any alcoholic beverages for 24 hours before and after your surgery. 5. Have a list of all current medications, including vitamins, herbal supplements and any other over the counter medications. Stop all Aspirin and non-steroidal anti-inflammatory drugs (I.e. Advil, Aleve), as directed by your surgeon's office. Stop all vitamins and herbal supplements seven days prior to your surgery. 6. Wear comfortable clothes. Wear glasses instead of contacts. Do not bring any money or jewelry. Please bring picture ID, insurance card, and any prearranged co-payment or hospital payment. Do not wear make-up, particularly mascara the morning of your surgery. Do not wear nail polish, particularly if you are having foot /hand surgery. Wear your hair loose or down, no ponytails, buns, fannie pins or clips. All body piercings must be removed.   Please shower with antibacterial soap for three consecutive days before and on the morning of surgery, but do not apply any lotions, powders or deodorants after the shower on the day of surgery. Please use a fresh towels after each shower. Please sleep in clean clothes and change bed linens the night before surgery. Please do not shave for 48 hours prior to surgery. Shaving of the face is acceptable. 7. You should understand that if you do not follow these instructions your surgery may be cancelled. If your physical condition changes (I.e. fever, cold or flu) please contact your surgeon as soon as possible. 8. It is important that you be on time. If a situation occurs where you may be late, please call [phone] (OR Holding Area). 9. If you have any questions and or problems, please call [phone] (Pre-admission Testing). 10. Your surgery time may be subject to change. You will receive a phone call the evening prior if your time changes. 11.  If having outpatient surgery, you must have someone to drive you here, stay with you during the duration of your stay, and to drive you home at time of discharge. Special Instructions:    MEDICATIONS TO TAKE THE MORNING OF SURGERY WITH A SIP OF WATER:use inhaler as needed--please bring inhaler to the hospital--use nebulizer if needed,thyroid,claritin as needed,paxil      I understand a pre-operative phone call will be made to verify my surgery time. In the event that I am not available, I give permission for a message to be left on my answering service and/or with another person?   yes         ___________________      __________   _________    (Signature of Patient)             (Witness)                (Date and Time)

## 2017-05-03 LAB
BACTERIA SPEC CULT: NORMAL
BACTERIA SPEC CULT: NORMAL
SERVICE CMNT-IMP: NORMAL

## 2017-05-09 NOTE — PERIOP NOTES
Follow up call to Dr. Eather Leyden office for medical evaluation note if available/vitamin D treatment.

## 2017-05-15 ENCOUNTER — ANESTHESIA EVENT (OUTPATIENT)
Dept: SURGERY | Age: 47
End: 2017-05-15
Payer: COMMERCIAL

## 2017-05-15 ENCOUNTER — HOSPITAL ENCOUNTER (OUTPATIENT)
Age: 47
Discharge: HOME | End: 2017-05-16
Attending: ORTHOPAEDIC SURGERY | Admitting: ORTHOPAEDIC SURGERY
Payer: COMMERCIAL

## 2017-05-15 ENCOUNTER — APPOINTMENT (OUTPATIENT)
Dept: GENERAL RADIOLOGY | Age: 47
End: 2017-05-15
Attending: ORTHOPAEDIC SURGERY
Payer: COMMERCIAL

## 2017-05-15 ENCOUNTER — ANESTHESIA (OUTPATIENT)
Dept: SURGERY | Age: 47
End: 2017-05-15
Payer: COMMERCIAL

## 2017-05-15 PROCEDURE — 77030014647 HC SEAL FBRN TISSL BAXT -D: Performed by: ORTHOPAEDIC SURGERY

## 2017-05-15 PROCEDURE — 74011000272 HC RX REV CODE- 272: Performed by: ORTHOPAEDIC SURGERY

## 2017-05-15 PROCEDURE — 76060000036 HC ANESTHESIA 2.5 TO 3 HR: Performed by: ORTHOPAEDIC SURGERY

## 2017-05-15 PROCEDURE — 77030020061 HC IV BLD WRMR ADMIN SET 3M -B: Performed by: ANESTHESIOLOGY

## 2017-05-15 PROCEDURE — 74011250636 HC RX REV CODE- 250/636: Performed by: ANESTHESIOLOGY

## 2017-05-15 PROCEDURE — 77030008467 HC STPLR SKN COVD -B: Performed by: ORTHOPAEDIC SURGERY

## 2017-05-15 PROCEDURE — 74011000250 HC RX REV CODE- 250

## 2017-05-15 PROCEDURE — 77030009868 HC PIN DISTR CASPR AESC -B: Performed by: ORTHOPAEDIC SURGERY

## 2017-05-15 PROCEDURE — 77030034475 HC MISC IMPL SPN: Performed by: ORTHOPAEDIC SURGERY

## 2017-05-15 PROCEDURE — 77030013567 HC DRN WND RESERV BARD -A: Performed by: ORTHOPAEDIC SURGERY

## 2017-05-15 PROCEDURE — 74011250637 HC RX REV CODE- 250/637: Performed by: PHYSICIAN ASSISTANT

## 2017-05-15 PROCEDURE — 77030018836 HC SOL IRR NACL ICUM -A: Performed by: ORTHOPAEDIC SURGERY

## 2017-05-15 PROCEDURE — 77030004391 HC BUR FLUT MEDT -C: Performed by: ORTHOPAEDIC SURGERY

## 2017-05-15 PROCEDURE — 77030014650 HC SEAL MTRX FLOSEL BAXT -C: Performed by: ORTHOPAEDIC SURGERY

## 2017-05-15 PROCEDURE — 77030003028 HC SUT VCRL J&J -A: Performed by: ORTHOPAEDIC SURGERY

## 2017-05-15 PROCEDURE — 77030013079 HC BLNKT BAIR HGGR 3M -A: Performed by: ORTHOPAEDIC SURGERY

## 2017-05-15 PROCEDURE — 77030029099 HC BN WAX SSPC -A: Performed by: ORTHOPAEDIC SURGERY

## 2017-05-15 PROCEDURE — 77030012414: Performed by: ORTHOPAEDIC SURGERY

## 2017-05-15 PROCEDURE — 77030033138 HC SUT PGA STRATFX J&J -B: Performed by: ORTHOPAEDIC SURGERY

## 2017-05-15 PROCEDURE — 74011250636 HC RX REV CODE- 250/636

## 2017-05-15 PROCEDURE — 77030021678 HC GLIDESCP STAT DISP VERT -B: Performed by: ANESTHESIOLOGY

## 2017-05-15 PROCEDURE — 77030003029 HC SUT VCRL J&J -B: Performed by: ORTHOPAEDIC SURGERY

## 2017-05-15 PROCEDURE — 77030002986 HC SUT PROL J&J -A: Performed by: ORTHOPAEDIC SURGERY

## 2017-05-15 PROCEDURE — 74011250636 HC RX REV CODE- 250/636: Performed by: ORTHOPAEDIC SURGERY

## 2017-05-15 PROCEDURE — C1713 ANCHOR/SCREW BN/BN,TIS/BN: HCPCS | Performed by: ORTHOPAEDIC SURGERY

## 2017-05-15 PROCEDURE — 77030008771 HC TU NG SALEM SUMP -A: Performed by: ANESTHESIOLOGY

## 2017-05-15 PROCEDURE — 77030019908 HC STETH ESOPH SIMS -A: Performed by: ANESTHESIOLOGY

## 2017-05-15 PROCEDURE — 77030008684 HC TU ET CUF COVD -B: Performed by: ANESTHESIOLOGY

## 2017-05-15 PROCEDURE — 77030018719 HC DRSG PTCH ANTIMIC J&J -A: Performed by: ORTHOPAEDIC SURGERY

## 2017-05-15 PROCEDURE — 77030034479 HC ADH SKN CLSR PRINEO J&J -B: Performed by: ORTHOPAEDIC SURGERY

## 2017-05-15 PROCEDURE — 76210000006 HC OR PH I REC 0.5 TO 1 HR: Performed by: ORTHOPAEDIC SURGERY

## 2017-05-15 PROCEDURE — 77030011267 HC ELECTRD BLD COVD -A: Performed by: ORTHOPAEDIC SURGERY

## 2017-05-15 PROCEDURE — 72040 X-RAY EXAM NECK SPINE 2-3 VW: CPT

## 2017-05-15 PROCEDURE — 77030034849: Performed by: ORTHOPAEDIC SURGERY

## 2017-05-15 PROCEDURE — 74011000250 HC RX REV CODE- 250: Performed by: ORTHOPAEDIC SURGERY

## 2017-05-15 PROCEDURE — C1821 INTERSPINOUS IMPLANT: HCPCS | Performed by: ORTHOPAEDIC SURGERY

## 2017-05-15 PROCEDURE — 77030013079 HC BLNKT BAIR HGGR 3M -A: Performed by: ANESTHESIOLOGY

## 2017-05-15 PROCEDURE — 76001 XR FLUOROSCOPY OVER 60 MINUTES: CPT

## 2017-05-15 PROCEDURE — P9045 ALBUMIN (HUMAN), 5%, 250 ML: HCPCS

## 2017-05-15 PROCEDURE — 76010000172 HC OR TIME 2.5 TO 3 HR INTENSV-TIER 1: Performed by: ORTHOPAEDIC SURGERY

## 2017-05-15 PROCEDURE — 74011250637 HC RX REV CODE- 250/637: Performed by: ORTHOPAEDIC SURGERY

## 2017-05-15 PROCEDURE — 77030012961 HC IRR KT CYSTO/TUR ICUM -A: Performed by: ORTHOPAEDIC SURGERY

## 2017-05-15 PROCEDURE — 77030026438 HC STYL ET INTUB CARD -A: Performed by: ANESTHESIOLOGY

## 2017-05-15 PROCEDURE — 77030020268 HC MISC GENERAL SUPPLY: Performed by: ORTHOPAEDIC SURGERY

## 2017-05-15 PROCEDURE — 77030002996 HC SUT SLK J&J -A: Performed by: ORTHOPAEDIC SURGERY

## 2017-05-15 PROCEDURE — 77030018846 HC SOL IRR STRL H20 ICUM -A: Performed by: ORTHOPAEDIC SURGERY

## 2017-05-15 PROCEDURE — 77030032490 HC SLV COMPR SCD KNE COVD -B: Performed by: ORTHOPAEDIC SURGERY

## 2017-05-15 DEVICE — IMPLANTABLE DEVICE: Type: IMPLANTABLE DEVICE | Site: NECK | Status: FUNCTIONAL

## 2017-05-15 DEVICE — GRAFT BNE SUB SM CANC FRZN MORSELIZED W/ VIABLE CELL: Type: IMPLANTABLE DEVICE | Site: NECK | Status: FUNCTIONAL

## 2017-05-15 DEVICE — PLATE SPNL L48MM ANT CERV TI 3 LEV SKYLINE: Type: IMPLANTABLE DEVICE | Site: NECK | Status: FUNCTIONAL

## 2017-05-15 RX ORDER — HYDROXYZINE HYDROCHLORIDE 10 MG/1
10 TABLET, FILM COATED ORAL
Status: DISCONTINUED | OUTPATIENT
Start: 2017-05-15 | End: 2017-05-16 | Stop reason: HOSPADM

## 2017-05-15 RX ORDER — MORPHINE SULFATE 10 MG/ML
2 INJECTION, SOLUTION INTRAMUSCULAR; INTRAVENOUS
Status: DISCONTINUED | OUTPATIENT
Start: 2017-05-15 | End: 2017-05-15 | Stop reason: HOSPADM

## 2017-05-15 RX ORDER — DIAZEPAM 5 MG/1
5 TABLET ORAL
Status: DISCONTINUED | OUTPATIENT
Start: 2017-05-15 | End: 2017-05-16 | Stop reason: HOSPADM

## 2017-05-15 RX ORDER — HYDROMORPHONE HYDROCHLORIDE 1 MG/ML
.2-.5 INJECTION, SOLUTION INTRAMUSCULAR; INTRAVENOUS; SUBCUTANEOUS
Status: DISCONTINUED | OUTPATIENT
Start: 2017-05-15 | End: 2017-05-15 | Stop reason: HOSPADM

## 2017-05-15 RX ORDER — GLYCOPYRROLATE 0.2 MG/ML
INJECTION INTRAMUSCULAR; INTRAVENOUS AS NEEDED
Status: DISCONTINUED | OUTPATIENT
Start: 2017-05-15 | End: 2017-05-15 | Stop reason: HOSPADM

## 2017-05-15 RX ORDER — ACETAMINOPHEN 500 MG
1000 TABLET ORAL EVERY 6 HOURS
Status: DISCONTINUED | OUTPATIENT
Start: 2017-05-15 | End: 2017-05-16 | Stop reason: HOSPADM

## 2017-05-15 RX ORDER — SODIUM CHLORIDE, SODIUM LACTATE, POTASSIUM CHLORIDE, CALCIUM CHLORIDE 600; 310; 30; 20 MG/100ML; MG/100ML; MG/100ML; MG/100ML
25 INJECTION, SOLUTION INTRAVENOUS CONTINUOUS
Status: DISCONTINUED | OUTPATIENT
Start: 2017-05-15 | End: 2017-05-15

## 2017-05-15 RX ORDER — ACETAMINOPHEN 500 MG
1000 TABLET ORAL ONCE
Status: COMPLETED | OUTPATIENT
Start: 2017-05-15 | End: 2017-05-15

## 2017-05-15 RX ORDER — SODIUM CHLORIDE 0.9 % (FLUSH) 0.9 %
5-10 SYRINGE (ML) INJECTION EVERY 8 HOURS
Status: DISCONTINUED | OUTPATIENT
Start: 2017-05-16 | End: 2017-05-16 | Stop reason: HOSPADM

## 2017-05-15 RX ORDER — SODIUM CHLORIDE 0.9 % (FLUSH) 0.9 %
5-10 SYRINGE (ML) INJECTION EVERY 8 HOURS
Status: DISCONTINUED | OUTPATIENT
Start: 2017-05-15 | End: 2017-05-15

## 2017-05-15 RX ORDER — SODIUM CHLORIDE 9 MG/ML
125 INJECTION, SOLUTION INTRAVENOUS CONTINUOUS
Status: DISCONTINUED | OUTPATIENT
Start: 2017-05-15 | End: 2017-05-16 | Stop reason: HOSPADM

## 2017-05-15 RX ORDER — ONDANSETRON 2 MG/ML
INJECTION INTRAMUSCULAR; INTRAVENOUS AS NEEDED
Status: DISCONTINUED | OUTPATIENT
Start: 2017-05-15 | End: 2017-05-15 | Stop reason: HOSPADM

## 2017-05-15 RX ORDER — GABAPENTIN 100 MG/1
100 CAPSULE ORAL 3 TIMES DAILY
Status: DISCONTINUED | OUTPATIENT
Start: 2017-05-15 | End: 2017-05-16

## 2017-05-15 RX ORDER — FENTANYL CITRATE 50 UG/ML
25 INJECTION, SOLUTION INTRAMUSCULAR; INTRAVENOUS
Status: DISCONTINUED | OUTPATIENT
Start: 2017-05-15 | End: 2017-05-15 | Stop reason: HOSPADM

## 2017-05-15 RX ORDER — NALOXONE HYDROCHLORIDE 0.4 MG/ML
0.4 INJECTION, SOLUTION INTRAMUSCULAR; INTRAVENOUS; SUBCUTANEOUS AS NEEDED
Status: DISCONTINUED | OUTPATIENT
Start: 2017-05-15 | End: 2017-05-16 | Stop reason: HOSPADM

## 2017-05-15 RX ORDER — LIDOCAINE HYDROCHLORIDE 20 MG/ML
INJECTION, SOLUTION EPIDURAL; INFILTRATION; INTRACAUDAL; PERINEURAL AS NEEDED
Status: DISCONTINUED | OUTPATIENT
Start: 2017-05-15 | End: 2017-05-15 | Stop reason: HOSPADM

## 2017-05-15 RX ORDER — ROCURONIUM BROMIDE 10 MG/ML
INJECTION, SOLUTION INTRAVENOUS AS NEEDED
Status: DISCONTINUED | OUTPATIENT
Start: 2017-05-15 | End: 2017-05-15 | Stop reason: HOSPADM

## 2017-05-15 RX ORDER — ALBUTEROL SULFATE 90 UG/1
1 AEROSOL, METERED RESPIRATORY (INHALATION)
Status: DISCONTINUED | OUTPATIENT
Start: 2017-05-15 | End: 2017-05-16 | Stop reason: HOSPADM

## 2017-05-15 RX ORDER — CEFAZOLIN SODIUM IN 0.9 % NACL 2 G/100 ML
2 PLASTIC BAG, INJECTION (ML) INTRAVENOUS ONCE
Status: COMPLETED | OUTPATIENT
Start: 2017-05-15 | End: 2017-05-15

## 2017-05-15 RX ORDER — LORATADINE 10 MG/1
10 TABLET ORAL AS NEEDED
Status: DISCONTINUED | OUTPATIENT
Start: 2017-05-15 | End: 2017-05-16 | Stop reason: HOSPADM

## 2017-05-15 RX ORDER — SODIUM CHLORIDE 0.9 % (FLUSH) 0.9 %
5-10 SYRINGE (ML) INJECTION AS NEEDED
Status: DISCONTINUED | OUTPATIENT
Start: 2017-05-15 | End: 2017-05-16 | Stop reason: HOSPADM

## 2017-05-15 RX ORDER — PAROXETINE HYDROCHLORIDE 20 MG/1
40 TABLET, FILM COATED ORAL DAILY
Status: DISCONTINUED | OUTPATIENT
Start: 2017-05-16 | End: 2017-05-16 | Stop reason: HOSPADM

## 2017-05-15 RX ORDER — PROPOFOL 10 MG/ML
INJECTION, EMULSION INTRAVENOUS AS NEEDED
Status: DISCONTINUED | OUTPATIENT
Start: 2017-05-15 | End: 2017-05-15 | Stop reason: HOSPADM

## 2017-05-15 RX ORDER — HYDROMORPHONE HYDROCHLORIDE 2 MG/ML
INJECTION, SOLUTION INTRAMUSCULAR; INTRAVENOUS; SUBCUTANEOUS AS NEEDED
Status: DISCONTINUED | OUTPATIENT
Start: 2017-05-15 | End: 2017-05-15 | Stop reason: HOSPADM

## 2017-05-15 RX ORDER — POLYETHYLENE GLYCOL 3350 17 G/17G
17 POWDER, FOR SOLUTION ORAL DAILY
Status: DISCONTINUED | OUTPATIENT
Start: 2017-05-16 | End: 2017-05-16 | Stop reason: HOSPADM

## 2017-05-15 RX ORDER — OXYCODONE HYDROCHLORIDE 5 MG/1
5 TABLET ORAL
Status: DISCONTINUED | OUTPATIENT
Start: 2017-05-15 | End: 2017-05-16 | Stop reason: HOSPADM

## 2017-05-15 RX ORDER — IBUPROFEN 200 MG
1 TABLET ORAL DAILY
Status: DISCONTINUED | OUTPATIENT
Start: 2017-05-15 | End: 2017-05-16 | Stop reason: HOSPADM

## 2017-05-15 RX ORDER — LEVOTHYROXINE SODIUM 100 UG/1
200 TABLET ORAL
Status: DISCONTINUED | OUTPATIENT
Start: 2017-05-16 | End: 2017-05-16 | Stop reason: HOSPADM

## 2017-05-15 RX ORDER — ALBUTEROL SULFATE 0.83 MG/ML
2.5 SOLUTION RESPIRATORY (INHALATION)
Status: DISCONTINUED | OUTPATIENT
Start: 2017-05-15 | End: 2017-05-16 | Stop reason: HOSPADM

## 2017-05-15 RX ORDER — SUCCINYLCHOLINE CHLORIDE 20 MG/ML
INJECTION INTRAMUSCULAR; INTRAVENOUS AS NEEDED
Status: DISCONTINUED | OUTPATIENT
Start: 2017-05-15 | End: 2017-05-15 | Stop reason: HOSPADM

## 2017-05-15 RX ORDER — FAMOTIDINE 20 MG/1
20 TABLET, FILM COATED ORAL 2 TIMES DAILY
Status: DISCONTINUED | OUTPATIENT
Start: 2017-05-15 | End: 2017-05-16 | Stop reason: HOSPADM

## 2017-05-15 RX ORDER — DEXAMETHASONE SODIUM PHOSPHATE 4 MG/ML
INJECTION, SOLUTION INTRA-ARTICULAR; INTRALESIONAL; INTRAMUSCULAR; INTRAVENOUS; SOFT TISSUE AS NEEDED
Status: DISCONTINUED | OUTPATIENT
Start: 2017-05-15 | End: 2017-05-15 | Stop reason: HOSPADM

## 2017-05-15 RX ORDER — IBUPROFEN 200 MG
1 TABLET ORAL DAILY
Status: DISCONTINUED | OUTPATIENT
Start: 2017-05-16 | End: 2017-05-15

## 2017-05-15 RX ORDER — NEOSTIGMINE METHYLSULFATE 1 MG/ML
INJECTION INTRAVENOUS AS NEEDED
Status: DISCONTINUED | OUTPATIENT
Start: 2017-05-15 | End: 2017-05-15 | Stop reason: HOSPADM

## 2017-05-15 RX ORDER — SODIUM CHLORIDE 0.9 % (FLUSH) 0.9 %
5-10 SYRINGE (ML) INJECTION AS NEEDED
Status: DISCONTINUED | OUTPATIENT
Start: 2017-05-15 | End: 2017-05-15 | Stop reason: HOSPADM

## 2017-05-15 RX ORDER — FENTANYL CITRATE 50 UG/ML
INJECTION, SOLUTION INTRAMUSCULAR; INTRAVENOUS AS NEEDED
Status: DISCONTINUED | OUTPATIENT
Start: 2017-05-15 | End: 2017-05-15 | Stop reason: HOSPADM

## 2017-05-15 RX ORDER — OXYCODONE HYDROCHLORIDE 5 MG/1
10 TABLET ORAL
Status: DISCONTINUED | OUTPATIENT
Start: 2017-05-15 | End: 2017-05-16 | Stop reason: HOSPADM

## 2017-05-15 RX ORDER — LIDOCAINE HYDROCHLORIDE 10 MG/ML
0.1 INJECTION, SOLUTION EPIDURAL; INFILTRATION; INTRACAUDAL; PERINEURAL AS NEEDED
Status: DISCONTINUED | OUTPATIENT
Start: 2017-05-15 | End: 2017-05-15

## 2017-05-15 RX ORDER — PHENYLEPHRINE HCL IN 0.9% NACL 0.4MG/10ML
SYRINGE (ML) INTRAVENOUS AS NEEDED
Status: DISCONTINUED | OUTPATIENT
Start: 2017-05-15 | End: 2017-05-15 | Stop reason: HOSPADM

## 2017-05-15 RX ORDER — HYDROMORPHONE HYDROCHLORIDE 2 MG/ML
1 INJECTION, SOLUTION INTRAMUSCULAR; INTRAVENOUS; SUBCUTANEOUS
Status: DISCONTINUED | OUTPATIENT
Start: 2017-05-15 | End: 2017-05-16 | Stop reason: HOSPADM

## 2017-05-15 RX ORDER — FACIAL-BODY WIPES
10 EACH TOPICAL DAILY PRN
Status: DISCONTINUED | OUTPATIENT
Start: 2017-05-17 | End: 2017-05-16 | Stop reason: HOSPADM

## 2017-05-15 RX ORDER — SODIUM CHLORIDE, SODIUM LACTATE, POTASSIUM CHLORIDE, CALCIUM CHLORIDE 600; 310; 30; 20 MG/100ML; MG/100ML; MG/100ML; MG/100ML
25 INJECTION, SOLUTION INTRAVENOUS CONTINUOUS
Status: DISCONTINUED | OUTPATIENT
Start: 2017-05-15 | End: 2017-05-15 | Stop reason: HOSPADM

## 2017-05-15 RX ORDER — MIDAZOLAM HYDROCHLORIDE 1 MG/ML
INJECTION, SOLUTION INTRAMUSCULAR; INTRAVENOUS AS NEEDED
Status: DISCONTINUED | OUTPATIENT
Start: 2017-05-15 | End: 2017-05-15 | Stop reason: HOSPADM

## 2017-05-15 RX ORDER — ALBUMIN HUMAN 50 G/1000ML
SOLUTION INTRAVENOUS AS NEEDED
Status: DISCONTINUED | OUTPATIENT
Start: 2017-05-15 | End: 2017-05-15 | Stop reason: HOSPADM

## 2017-05-15 RX ORDER — DEXAMETHASONE SODIUM PHOSPHATE 4 MG/ML
10 INJECTION, SOLUTION INTRA-ARTICULAR; INTRALESIONAL; INTRAMUSCULAR; INTRAVENOUS; SOFT TISSUE ONCE
Status: COMPLETED | OUTPATIENT
Start: 2017-05-16 | End: 2017-05-15

## 2017-05-15 RX ORDER — SODIUM CHLORIDE 0.9 % (FLUSH) 0.9 %
5-10 SYRINGE (ML) INJECTION AS NEEDED
Status: DISCONTINUED | OUTPATIENT
Start: 2017-05-15 | End: 2017-05-15

## 2017-05-15 RX ORDER — DIPHENHYDRAMINE HYDROCHLORIDE 50 MG/ML
12.5 INJECTION, SOLUTION INTRAMUSCULAR; INTRAVENOUS
Status: DISCONTINUED | OUTPATIENT
Start: 2017-05-15 | End: 2017-05-15 | Stop reason: HOSPADM

## 2017-05-15 RX ORDER — ONDANSETRON 2 MG/ML
4 INJECTION INTRAMUSCULAR; INTRAVENOUS
Status: DISCONTINUED | OUTPATIENT
Start: 2017-05-15 | End: 2017-05-16 | Stop reason: HOSPADM

## 2017-05-15 RX ORDER — CEFAZOLIN SODIUM IN 0.9 % NACL 2 G/100 ML
2 PLASTIC BAG, INJECTION (ML) INTRAVENOUS EVERY 8 HOURS
Status: COMPLETED | OUTPATIENT
Start: 2017-05-15 | End: 2017-05-16

## 2017-05-15 RX ORDER — DIPHENHYDRAMINE HYDROCHLORIDE 50 MG/ML
INJECTION, SOLUTION INTRAMUSCULAR; INTRAVENOUS AS NEEDED
Status: DISCONTINUED | OUTPATIENT
Start: 2017-05-15 | End: 2017-05-15 | Stop reason: HOSPADM

## 2017-05-15 RX ORDER — AMOXICILLIN 250 MG
1 CAPSULE ORAL 2 TIMES DAILY
Status: DISCONTINUED | OUTPATIENT
Start: 2017-05-15 | End: 2017-05-16 | Stop reason: HOSPADM

## 2017-05-15 RX ORDER — PREGABALIN 75 MG/1
150 CAPSULE ORAL ONCE
Status: COMPLETED | OUTPATIENT
Start: 2017-05-15 | End: 2017-05-15

## 2017-05-15 RX ADMIN — ALBUMIN HUMAN 250 ML: 50 SOLUTION INTRAVENOUS at 14:32

## 2017-05-15 RX ADMIN — GLYCOPYRROLATE 0.4 MG: 0.2 INJECTION INTRAMUSCULAR; INTRAVENOUS at 14:50

## 2017-05-15 RX ADMIN — SODIUM CHLORIDE 125 ML/HR: 900 INJECTION, SOLUTION INTRAVENOUS at 15:37

## 2017-05-15 RX ADMIN — DIAZEPAM 5 MG: 5 TABLET ORAL at 15:45

## 2017-05-15 RX ADMIN — OXYCODONE HYDROCHLORIDE 10 MG: 5 TABLET ORAL at 18:25

## 2017-05-15 RX ADMIN — HYDROMORPHONE HYDROCHLORIDE 0.5 MG: 2 INJECTION, SOLUTION INTRAMUSCULAR; INTRAVENOUS; SUBCUTANEOUS at 13:07

## 2017-05-15 RX ADMIN — SODIUM CHLORIDE, SODIUM LACTATE, POTASSIUM CHLORIDE, AND CALCIUM CHLORIDE: 600; 310; 30; 20 INJECTION, SOLUTION INTRAVENOUS at 13:26

## 2017-05-15 RX ADMIN — PROPOFOL 200 MG: 10 INJECTION, EMULSION INTRAVENOUS at 12:49

## 2017-05-15 RX ADMIN — NEOSTIGMINE METHYLSULFATE 3 MG: 1 INJECTION INTRAVENOUS at 14:50

## 2017-05-15 RX ADMIN — Medication 80 MCG: at 13:15

## 2017-05-15 RX ADMIN — SODIUM CHLORIDE 125 ML/HR: 900 INJECTION, SOLUTION INTRAVENOUS at 21:05

## 2017-05-15 RX ADMIN — FENTANYL CITRATE 100 MCG: 50 INJECTION, SOLUTION INTRAMUSCULAR; INTRAVENOUS at 12:49

## 2017-05-15 RX ADMIN — ACETAMINOPHEN 1000 MG: 500 TABLET ORAL at 23:29

## 2017-05-15 RX ADMIN — FAMOTIDINE 20 MG: 20 TABLET ORAL at 18:26

## 2017-05-15 RX ADMIN — SUCCINYLCHOLINE CHLORIDE 100 MG: 20 INJECTION INTRAMUSCULAR; INTRAVENOUS at 12:50

## 2017-05-15 RX ADMIN — FENTANYL CITRATE 25 MCG: 50 INJECTION, SOLUTION INTRAMUSCULAR; INTRAVENOUS at 16:00

## 2017-05-15 RX ADMIN — ACETAMINOPHEN 1000 MG: 500 TABLET ORAL at 18:24

## 2017-05-15 RX ADMIN — DEXAMETHASONE SODIUM PHOSPHATE 8 MG: 4 INJECTION, SOLUTION INTRA-ARTICULAR; INTRALESIONAL; INTRAMUSCULAR; INTRAVENOUS; SOFT TISSUE at 12:49

## 2017-05-15 RX ADMIN — DEXAMETHASONE SODIUM PHOSPHATE 10 MG: 4 INJECTION, SOLUTION INTRAMUSCULAR; INTRAVENOUS at 23:29

## 2017-05-15 RX ADMIN — MIDAZOLAM HYDROCHLORIDE 2 MG: 1 INJECTION, SOLUTION INTRAMUSCULAR; INTRAVENOUS at 12:42

## 2017-05-15 RX ADMIN — DIPHENHYDRAMINE HYDROCHLORIDE 12.5 MG: 50 INJECTION, SOLUTION INTRAMUSCULAR; INTRAVENOUS at 13:13

## 2017-05-15 RX ADMIN — ROCURONIUM BROMIDE 5 MG: 10 INJECTION, SOLUTION INTRAVENOUS at 14:38

## 2017-05-15 RX ADMIN — FENTANYL CITRATE 25 MCG: 50 INJECTION, SOLUTION INTRAMUSCULAR; INTRAVENOUS at 15:50

## 2017-05-15 RX ADMIN — ACETAMINOPHEN 1000 MG: 500 TABLET ORAL at 12:13

## 2017-05-15 RX ADMIN — HYDROMORPHONE HYDROCHLORIDE 0.5 MG: 1 INJECTION, SOLUTION INTRAMUSCULAR; INTRAVENOUS; SUBCUTANEOUS at 15:45

## 2017-05-15 RX ADMIN — CEFAZOLIN 2 G: 10 INJECTION, POWDER, FOR SOLUTION INTRAVENOUS; PARENTERAL at 21:05

## 2017-05-15 RX ADMIN — LIDOCAINE HYDROCHLORIDE 60 MG: 20 INJECTION, SOLUTION EPIDURAL; INFILTRATION; INTRACAUDAL; PERINEURAL at 12:49

## 2017-05-15 RX ADMIN — DOCUSATE SODIUM AND SENNOSIDES 1 TABLET: 8.6; 5 TABLET, FILM COATED ORAL at 18:26

## 2017-05-15 RX ADMIN — FENTANYL CITRATE 25 MCG: 50 INJECTION, SOLUTION INTRAMUSCULAR; INTRAVENOUS at 16:05

## 2017-05-15 RX ADMIN — FENTANYL CITRATE 25 MCG: 50 INJECTION, SOLUTION INTRAMUSCULAR; INTRAVENOUS at 15:55

## 2017-05-15 RX ADMIN — ROCURONIUM BROMIDE 35 MG: 10 INJECTION, SOLUTION INTRAVENOUS at 13:07

## 2017-05-15 RX ADMIN — PREGABALIN 150 MG: 75 CAPSULE ORAL at 12:13

## 2017-05-15 RX ADMIN — GABAPENTIN 100 MG: 100 CAPSULE ORAL at 21:53

## 2017-05-15 RX ADMIN — OXYCODONE HYDROCHLORIDE 5 MG: 5 TABLET ORAL at 21:53

## 2017-05-15 RX ADMIN — HYDROMORPHONE HYDROCHLORIDE 0.5 MG: 1 INJECTION, SOLUTION INTRAMUSCULAR; INTRAVENOUS; SUBCUTANEOUS at 16:00

## 2017-05-15 RX ADMIN — GABAPENTIN 100 MG: 100 CAPSULE ORAL at 18:26

## 2017-05-15 RX ADMIN — CEFAZOLIN 2 G: 10 INJECTION, POWDER, FOR SOLUTION INTRAVENOUS; PARENTERAL at 13:03

## 2017-05-15 RX ADMIN — ONDANSETRON 4 MG: 2 INJECTION INTRAMUSCULAR; INTRAVENOUS at 12:59

## 2017-05-15 RX ADMIN — SODIUM CHLORIDE, SODIUM LACTATE, POTASSIUM CHLORIDE, AND CALCIUM CHLORIDE 25 ML/HR: 600; 310; 30; 20 INJECTION, SOLUTION INTRAVENOUS at 12:14

## 2017-05-15 NOTE — BRIEF OP NOTE
BRIEF OPERATIVE NOTE    Date of Procedure: 5/15/2017   Preoperative Diagnosis: CERVICALGIA, RADICULOPAHTY, SPONDYLOSIS WITH MYELOPATHY, HNP WITH MYELOPATHY, STENOSIS  Postoperative Diagnosis: CERVICALGIA, RADICULOPAHTY, SPONDYLOSIS WITH MYELOPATHY, HNP WITH MYELOPATHY, STENOSIS    Procedure(s):  C3-6 ACDF  Surgeon(s) and Role:     * Sam Link MD - Primary         Assistant Staff:       Surgical Staff:  Circ-1: Elisabeth Art RN  Radiology Technician: RT Trenton  Scrub Tech-1: Janelle Guerrero  Surg Asst-1: Carey Moreno RN  Surg Asst-Relief: Marc Willis  Event Time In   Incision Start 1327   Incision Close      Anesthesia: General   Estimated Blood Loss: 300cc  Specimens: * No specimens in log *   Findings: stenosis   Complications: none  Implants:   Implant Name Type Inv.  Item Serial No.  Lot No. LRB No. Used Action   FLAT CERVICAL SPACER 12MM X 14MM 6MM   N/A  [id number] N/A 1 Implanted   GRAFT INESE [employer] [facility] --  - [id number]  GRAFT BNE [employer] [facility] --  [id number] MUSCULOSKELETAL TRANS N/A N/A 1 Implanted   FLAT CERVICAL SPACER 12MM X 14MM X 6MM   N/A  [id number] N/A 1 Implanted   GRAFT BNE ELITE [facility] --  - [id number]  GRAFT BNE [employer] [facility] --  [id number] MUSCULOSKELETAL TRANS N/A N/A 1 Implanted   FLAT CERVICAL SPACER 12MM X 14MM X 6MM   N/A  [id number] N/A 1 Implanted   GRAFT BNE [employer] [facility] --  - [id number]  GRAFT BNE [employer] [facility] --  [id number] MUSCULOSKELETAL TRANS N/A N/A 1 Implanted   PLATE CERV ANTR 3 LVL 48MM TI -- SKYLINE - -  PLATE CERV ANTR 3 LVL 48MM TI -- SKYLINE [id number]- [facility] DEPUY SPINE N/A N/A 1 Implanted   SCR CERV ANTR FA ST 12MM TI -- SKYLINE - -   SCR CERV ANTR FA ST 12MM TI -- SKYLINE [id number]- [facility] DEPUY SPINE N/A N/A 8 Implanted

## 2017-05-15 NOTE — IP AVS SNAPSHOT
[address] [facility] 
[phone] Patient: Brannon Negro MRN: [id number] NZZ:[id number]/66/[id number] You are allergic to the following Allergen Reactions Hydrocodone Hives Recent Documentation OB Status Smoking Status Having regular periods Current Every Day Smoker Emergency Contacts Name Discharge Info Relation Home Work Mobile Deo Cross  Spouse [3] [phone] About your hospitalization You were admitted on:  May 15, 2017 You last received care in the:  [facility] ORTHOPEDICS You were discharged on:  May 16, 2017 Unit phone number:  [phone] Why you were hospitalized Your primary diagnosis was:  Not on File Providers Seen During Your Hospitalizations Provider Role Specialty Primary office phone Isak Calloway MD Attending Provider Orthopedic Surgery [phone] Your Primary Care Physician (PCP) Primary Care Physician Office Phone Office Fax Veronica, [facility] [phone] Follow-up Information Follow up With Details Comments Contact Info Gregoria Moya MD   [address] [address] [phone] Isak Calloway MD In 2 weeks  [address] [facility] 
[phone] Current Discharge Medication List  
  
START taking these medications Dose & Instructions Dispensing Information Comments Morning Noon Evening Bedtime  
 diazePAM 5 mg tablet Commonly known as:  VALIUM Your last dose was: Your next dose is:    
   
   
 Dose:  5 mg Take 1 Tab by mouth every six (6) hours as needed. Max Daily Amount: 20 mg.  
 Quantity:  40 Tab Refills:  0  
     
   
   
   
  
 gabapentin 300 mg capsule Commonly known as:  NEURONTIN Your last dose was: Your next dose is: Dose:  300 mg Take 1 Cap by mouth three (3) times daily for 30 days. Quantity:  90 Cap Refills:  0  
     
   
   
   
  
 oxyCODONE IR 5 mg immediate release tablet Commonly known as:  Georgetterick Hernandez Your last dose was: Your next dose is:    
   
   
 Dose:  5-10 mg Take 1-2 Tabs by mouth every three (3) hours as needed. Max Daily Amount: 80 mg.  
 Quantity:  80 Tab Refills:  0  
     
   
   
   
  
 polyethylene glycol 17 gram packet Commonly known as:  Simone Cecilio Your last dose was: Your next dose is:    
   
   
 Dose:  17 g Take 1 Packet by mouth daily as needed (constipation) for up to 15 days. Quantity:  15 Packet Refills:  0  
     
   
   
   
  
 senna-docusate 8.6-50 mg per tablet Commonly known as:  Eulalia Blind Your last dose was: Your next dose is:    
   
   
 Dose:  1 Tab Take 1 Tab by mouth daily. Quantity:  30 Tab Refills:  0 CONTINUE these medications which have NOT CHANGED Dose & Instructions Dispensing Information Comments Morning Noon Evening Bedtime * albuterol 2.5 mg /3 mL (0.083 %) nebulizer solution Commonly known as:  PROVENTIL VENTOLIN Your last dose was: Your next dose is:    
   
   
 by Nebulization route as needed for Wheezing. Refills:  0  
     
   
   
   
  
 * albuterol 90 mcg/actuation inhaler Commonly known as:  PROVENTIL HFA, VENTOLIN HFA, PROAIR HFA Your last dose was: Your next dose is:    
   
   
 Dose:  1 Puff Take 1 Puff by inhalation every four (4) hours as needed for Wheezing. Quantity:  1 Inhaler Refills:  2 CLARITIN 10 mg tablet Generic drug:  loratadine Your last dose was: Your next dose is:    
   
   
 Dose:  10 mg Take 10 mg by mouth as needed. Refills:  0  
     
   
   
   
  
 levothyroxine 200 mcg tablet Commonly known as:  SYNTHROID Your last dose was: Your next dose is:    
   
   
 Dose:  200 mcg Take 1 Tab by mouth Daily (before breakfast). Take with 50mcg daily for total 250mcg daily Quantity:  30 Tab Refills:  3 MOTRIN  mg tablet Generic drug:  ibuprofen Your last dose was: Your next dose is:    
   
   
 Dose:  200 mg Take 200 mg by mouth as needed. Refills:  0 PARoxetine 40 mg tablet Commonly known as:  PAXIL Your last dose was: Your next dose is: TAKE 1 TABLET BY MOUTH DAILY. Quantity:  30 Tab Refills:  2 TYLENOL 325 mg tablet Generic drug:  acetaminophen Your last dose was: Your next dose is: Take  by mouth every four (4) hours as needed for Pain. Refills:  0  
     
   
   
   
  
 * Notice: This list has 2 medication(s) that are the same as other medications prescribed for you. Read the directions carefully, and ask your doctor or other care provider to review them with you. Where to Get Your Medications Information on where to get these meds will be given to you by the nurse or doctor. ! Ask your nurse or doctor about these medications  
  diazePAM 5 mg tablet  
 gabapentin 300 mg capsule  
 oxyCODONE IR 5 mg immediate release tablet  
 polyethylene glycol 17 gram packet  
 senna-docusate 8.6-50 mg per tablet Discharge Instructions [address] [facility] Theresa Padgett Discharge Instruction Sheet: Anterior Cervical Fusion Alie Matute Pain control: 
Typically, we will prescribe a narcotic usually 1-2 tabs every four hours is sufficient for the pain. Most patients need this only for the first few weeks. You should discontinue this as the pain decreases. You should not drive while taking any narcotic pain medications. Constipation Pain medicines and anesthesia can be constipating-this can be prevented by gentle physical activity and drinking plenty of fluid. It should be treated with over-the-counter medications such as Miralax or suppositories, and/or Fleets enema. You should have a bowel movement at least every other day following surgery. Incision care Keep this area clean and dry. Do not remove the dressing. DO NOT take a tub bath or go swimming until cleared by your doctor. DO NOT apply lotions, oils, or creams to incision. Cover the wound with an impermiable dressing to shower for then next 5 days, then no cover is needed. Wear cervical collar for comfort. If staples are in place, they should be removed about 14-20 days after surgery. To increase and promote healing: 
? Stop Smoking (or at least cut back on smoking). ? Eat a well-balanced diet (high in protein and vitamin C) ? If your appetite is poor, consider nutritional supplements like Ensure, Glucerna, or [place] Instant Breakfast. 
? If you are diabetic, controlling you blood sugars is very important to prevent infection and promote wound healing. Nutrition: ? If you were on a supplement such as Ensure or Glucerna) while in the hospital, please continue using them with each meal for the next 30 days. ? Eat a well-balanced diet - High in protein, high in vitamins and minerals, especially vitamin C and zinc.  
 
Restrictions: 
Limited bending at waist 
Lift no more than 10 pounds Warning signs :  
Please call your physician IMMEDIATELY at [phone] if you have: ? If you have throat soreness that worsens ? If you have difficulty swallowing. ? If you have any difficulty breathing ? Bleeding from incision that is constant. ? Change in mental status (unusual behavior or confusion) ? If your incision develops redness or swelling 
? Change in wound drainage (increase in amount, color, or foul odor) ? [place] over 101.5 degrees Fahrenheit ? Headache that is not relieved with pain medication ? Tenderness or redness in the calf of your leg Emergency: CALL 911 if you have: ? Any Difficulty Breathing or Shortness of breath ? Difficulty Swallowing ? Chest pain ? Localized chest pain when coughing or taking a deep breath Edmond Michael Follow-up Please call Dr. Laura Mckenna office for a follow up appointment in 2 weeks at [phone]. You can return to work when cleared by a physician. During normal business hours you may reach Dr. Kaley Carson' team directly at [phone] if you have concerns or questions. Discharge Orders None [employer] Announcement We are excited to announce that we are making your provider's discharge notes available to you in [employer]. You will see these notes when they are completed and signed by the physician that discharged you from your recent hospital stay. If you have any questions or concerns about any information you see in [employer], please call the Health Information Department where you were seen or reach out to your Primary Care Provider for more information about your plan of care. Introducing [facility] & HEALTH SERVICES! Dear Brandon Bolton: Thank you for requesting a [employer] account. Our records indicate that you have previously registered for a [employer] account but its currently inactive. Please call our [employer] support line at [phone]. Additional Information If you have questions, please visit the Frequently Asked Questions section of the [employer] website at [email or web address] Remember, [employer] is NOT to be used for urgent needs. For medical emergencies, dial 911. Now available from your iPhone and Android! General Information Please provide this summary of care documentation to your next provider. Patient Signature:  ____________________________________________________________ Date:  ____________________________________________________________  
  
Krys Grace Provider Signature:  ____________________________________________________________ Date:  ____________________________________________________________

## 2017-05-15 NOTE — ROUTINE PROCESS
TRANSFER - IN REPORT:    Verbal report received from SUSAN John RN(name) on Marine Klinefelter  being received from OR(unit) for routine post - op      Report consisted of patients Situation, Background, Assessment and   Recommendations(SBAR). Information from the following report(s) OR Summary was reviewed with the receiving nurse. Opportunity for questions and clarification was provided. Assessment completed upon patients arrival to unit and care assumed.

## 2017-05-15 NOTE — PROGRESS NOTES
Ortho/ NeuroSurgery NP Note    POD# 0  s/p C3-6 ACDF     Pt resting in bed. No complaints. Had neck pain and left hand numbness pre-op, currently unchanged. A&O x4   VSS Afebrile. Patient has not had something to eat. No nausea. Most Recent Labs:   Lab Results   Component Value Date/Time    HGB 13.6 05/02/2017 11:30 AM    Hemoglobin A1c 5.5 05/02/2017 11:30 AM     MRSA Screen Pre-op Negative  U/A Screen Pre-Op Negative    There is no height or weight on file to calculate BMI. BMI greater than 30 is classified as obesity and greater than 40 is classified as morbid obesity. STOP BANG Score: 2    Social History: No significant history. Huynh out. Patient is voiding in adequate amounts. Dressing c.d.i  Drain in place. Calves soft and supple; No pain with passive stretch  Motor intact but weak effort. Sensation as above. SCDs for mechanical DVT proph    Plan:  1) Nursing to mobilize.    2) Ros-op Antibiotics Ancef  3) Discharge plans to home with her      Ang Negron NP

## 2017-05-15 NOTE — ANESTHESIA POSTPROCEDURE EVALUATION
Post-Anesthesia Evaluation and Assessment    Patient: Marine Klinefelter MRN: [id number]  SSN: [id number]    YOB: 1970  Age: 52 y.o. Sex: female       Cardiovascular Function/Vital Signs  Visit Vitals    /69    Pulse 85    Temp 36.9 °C (98.4 °F)    Resp 10    SpO2 99%       Patient is status post general anesthesia for Procedure(s):  C3-6 ACDF. Nausea/Vomiting: None    Postoperative hydration reviewed and adequate. Pain:  Pain Scale 1: Numeric (0 - 10) (05/15/17 1605)  Pain Intensity 1: 4 (05/15/17 1605)   Managed    Neurological Status:   Neuro (WDL): Exceptions to WDL (05/15/17 1522)  Neuro  Neurologic State: Drowsy; Eyes open spontaneously;Sleeping (05/15/17 1522)  Orientation Level: Oriented to person;Oriented to place;Oriented to situation (05/15/17 1522)  Cognition: Follows commands (05/15/17 1522)  Speech: Clear (05/15/17 1522)  LUE Motor Response: Purposeful (05/15/17 1522)  LLE Motor Response: Purposeful (05/15/17 1522)  RUE Motor Response: Numbness; Purposeful (Numbness in Hand) (05/15/17 1522)  RLE Motor Response: Purposeful (05/15/17 1522)   At baseline    Mental Status and Level of Consciousness: Arousable    Pulmonary Status:   O2 Device: Nasal cannula (05/15/17 1522)   Adequate oxygenation and airway patent    Complications related to anesthesia: None    Post-anesthesia assessment completed.  No concerns    Signed By: Dorian Ramirez MD     May 15, 2017

## 2017-05-15 NOTE — H&P
HISTORY AND PHYSICAL    Subjective:     CHIEF COMPLAINT: Neck and right upper extremity pain. HPI: Rhianna Newsome is a pleasant, 51-year-old lady complaining of neck pain radiating to bilateral upper extremities with the left being much more severe than the right and radiating mostly to the long, ring and small fingers. This has been going on for about four months. She had done some chiropractic, she has taken some anti-inflammatories and she's been unable to obtain any relief. The pain is rated between an 8 and a 10/10 and has been affecting daily functions such as writing and fine motor skills which she has trouble with those. She has been having trouble also with her balance and fine motor activities. The discomfort in the hands is numb, tingling and she did have some radiation to her thumbs initially. Patient Active Problem List    Diagnosis Date Noted    Numbness in both hands 03/27/2017    Tobacco use 02/17/2014    Hypothyroidism     Generalized anxiety disorder     COPD (chronic obstructive pulmonary disease) ([facility] [address]      Past Medical History:   Diagnosis Date    Allergic rhinitis     COPD (chronic obstructive pulmonary disease) ([facility] [address]     mild    Generalized anxiety disorder     Hypothyroidism     Psychiatric disorder       Past Surgical History:   Procedure Laterality Date    HX ORTHOPAEDIC      right wrist tendon    HX OTHER SURGICAL      colonoscopy    HX TUBAL LIGATION        Prior to Admission medications    Medication Sig Start Date End Date Taking? Authorizing Provider   PARoxetine (PAXIL) 40 mg tablet TAKE 1 TABLET BY MOUTH DAILY. 8/21/16  Yes Laverne Fernandes MD   loratadine (CLARITIN) 10 mg tablet Take 10 mg by mouth as needed. Yes Historical Provider   albuterol (PROVENTIL VENTOLIN) 2.5 mg /3 mL (0.083 %) nebulizer solution by Nebulization route as needed for Wheezing.     Historical Provider   albuterol (PROVENTIL HFA, VENTOLIN HFA, PROAIR HFA) 90 mcg/actuation inhaler Take 1 Puff by inhalation every four (4) hours as needed for Wheezing. 2/6/17   Destinee Crowley MD   levothyroxine (SYNTHROID) 200 mcg tablet Take 1 Tab by mouth Daily (before breakfast). Take with 50mcg daily for total 250mcg daily 7/3/16   Armin Nichols MD   ibuprofen (MOTRIN IB) 200 mg tablet Take 200 mg by mouth as needed. Historical Provider   acetaminophen (TYLENOL) 325 mg tablet Take  by mouth every four (4) hours as needed for Pain. Historical Provider     Allergies   Allergen Reactions    Hydrocodone Hives      Social History   Substance Use Topics    Smoking status: Current Every Day Smoker     Packs/day: 1.50     Years: 20.00     Types: Cigarettes    Smokeless tobacco: Never Used    Alcohol use No      Family History   Problem Relation Age of Onset    Diabetes Mother     Hypertension Mother     Cancer Mother      uterine--chemo tx    Heart Disease Father       Review of Systems  No bowel or bladder incontinence. No fevers or chills. No saddle anesthesia. Objective:     Patient Vitals for the past 8 hrs:   BP Temp Pulse Resp SpO2   05/15/17 1207 (!) 153/91 97.9 °F (36.6 °C) 71 20 98 %     General Appearance:  - Well developed patient appropriate for age. Orientation:  - Alert, active and oriented x3     Mood and Affect:  - Appropriate     Cardiovascular:  - No  edema      Skin:  - No lesions on the neck  - No lesions on the right upper extremity  - No lesions on the left upper extremity     Gait:  - slow     Balance:  - Unstable     Neck:  - No tenderness to palpation   - Decreased range of motion     Right upper extremity:  - No Tenderness to palpation   -  Full range of motion  - 5 out of 5 strength to all key muscles from C5 to T1. Left upper extremity:  - No Tenderness to palpation   -  Full range of motion  - 5 out of 5 strength to all key muscles from C5 to T1.      Sensation:  - Decreased bilaterally     Reflexes:  - +3 biceps    - +3 brachioradialis    - +3 triceps        Positive Enrique's bilaterally. Imaging Review  I reviewed x-rays of her cervical spine taken at [facility] showing she has degenerative changes from C4 through C6/C7. I've also reviewed an MRI of the cervical spine done at [facility] on 4/3/17, showing she has severe stenosis from C3 through C6 worse at C5/C6. There is cord shape change into a banana-shape with severe compression. No cord signal changes seen however. Assessment:     Active Problems:    * No active hospital problems. *    Ms. Rene Oleary is a pleasant, 66-year-old lady coming in today with complaints of neck pain radiating into bilateral upper extremities. She does have some cervical spondylosis with myelopathy, cervical spinal stenosis and disk herniations as well as radicular pain. At this point, we have talked with her about surgical intervention given her hyper-reflexia and her changes in ability to perform fine motor skills and severe stenosis. We discussed options which at this point she would like to proceed with a surgical intervention, a C3 through C6 ACDF. We've gone over the details of the procedure.      We discussed the risks of surgery include but are not limited to: death, heart attack, stroke, lung injury or infection, blindness, illeus, bladder or bowel problems, bleeding, nerve injury (including numbness, pain and weakness), paralysis (which may be permanent), failure to heal, failure to fuse bone together in fusion procedures, failure of relief of symptoms, failure of relief of pain, increased pain, need for further surgery, failure or breakage of hardware, malpositioning of hardware, need to fuse or operate on additional levels determined either during or after surgery, destabilization of the spine which may require fusion or later surgery, infections which  may or may not require additional surgery, dural tears (tears of the sack holding in nerves and spinal fluid, meningitis, voice changes, vocal cord injury, blood clots, pulmonary embolus, and anesthetic complication. Comorbidities such as obesity, smoking, rheumatoid arthritis, chronic steroid use and diabetes increases these risks. She understands and wants to proceed. Plan:     The various methods of treatment have been discussed with the patient and family. After consideration of risks, benefits and other options for treatment, the patient has consented to surgical interventions (C3-6 ACDF). Questions were answered and Pre-op teaching was done by myself.

## 2017-05-15 NOTE — PERIOP NOTES
TRANSFER - OUT REPORT:    Verbal report given to Dawna RN(name) on [facility]  being transferred to [facility](unit) for routine progression of care       Report consisted of patients Situation, Background, Assessment and   Recommendations(SBAR). Information from the following report(s) OR Summary, Procedure Summary, Intake/Output and MAR was reviewed with the receiving nurse. Opportunity for questions and clarification was provided.       Patient transported with:   O2 @ 2 liters  Tech

## 2017-05-15 NOTE — ANESTHESIA PREPROCEDURE EVALUATION
Anesthetic History   No history of anesthetic complications            Review of Systems / Medical History  Patient summary reviewed, nursing notes reviewed and pertinent labs reviewed    Pulmonary    COPD: mild      Smoker         Neuro/Psych         Psychiatric history     Cardiovascular  Within defined limits                Exercise tolerance: >4 METS     GI/Hepatic/Renal  Within defined limits              Endo/Other      Hypothyroidism: well controlled       Other Findings            Physical Exam    Airway  Mallampati: II  TM Distance: 4 - 6 cm  Neck ROM: decreased range of motion   Mouth opening: Normal     Cardiovascular    Rhythm: regular  Rate: normal         Dental    Dentition: Full upper dentures and Poor dentition     Pulmonary  Breath sounds clear to auscultation               Abdominal  GI exam deferred       Other Findings            Anesthetic Plan    ASA: 2  Anesthesia type: general    Monitoring Plan: BIS      Induction: Intravenous  Anesthetic plan and risks discussed with: Patient

## 2017-05-15 NOTE — PROGRESS NOTES
Patient arrived to room [phone] with 2/10 pain, and denies nausea. Skin is clear with surgical incision on anterior of neck.  Dual skin assessment preformed with Lorry Seats RN

## 2017-05-16 ENCOUNTER — APPOINTMENT (OUTPATIENT)
Dept: GENERAL RADIOLOGY | Age: 47
End: 2017-05-16
Attending: ORTHOPAEDIC SURGERY
Payer: COMMERCIAL

## 2017-05-16 VITALS
HEART RATE: 90 BPM | SYSTOLIC BLOOD PRESSURE: 123 MMHG | RESPIRATION RATE: 17 BRPM | DIASTOLIC BLOOD PRESSURE: 75 MMHG | OXYGEN SATURATION: 98 % | TEMPERATURE: 98.2 F

## 2017-05-16 PROCEDURE — 74011250637 HC RX REV CODE- 250/637: Performed by: ORTHOPAEDIC SURGERY

## 2017-05-16 PROCEDURE — 72040 X-RAY EXAM NECK SPINE 2-3 VW: CPT

## 2017-05-16 PROCEDURE — 74011250636 HC RX REV CODE- 250/636: Performed by: ORTHOPAEDIC SURGERY

## 2017-05-16 RX ORDER — AMOXICILLIN 250 MG
1 CAPSULE ORAL DAILY
Qty: 30 TAB | Refills: 0 | Status: SHIPPED | OUTPATIENT
Start: 2017-05-16 | End: 2017-06-12 | Stop reason: ALTCHOICE

## 2017-05-16 RX ORDER — POLYETHYLENE GLYCOL 3350 17 G/17G
17 POWDER, FOR SOLUTION ORAL
Qty: 15 PACKET | Refills: 0 | Status: SHIPPED | OUTPATIENT
Start: 2017-05-16 | End: 2017-05-31

## 2017-05-16 RX ORDER — OXYCODONE HYDROCHLORIDE 5 MG/1
5-10 TABLET ORAL
Qty: 80 TAB | Refills: 0 | Status: SHIPPED | OUTPATIENT
Start: 2017-05-16 | End: 2017-06-12 | Stop reason: ALTCHOICE

## 2017-05-16 RX ORDER — DIAZEPAM 5 MG/1
5 TABLET ORAL
Qty: 40 TAB | Refills: 0 | Status: SHIPPED | OUTPATIENT
Start: 2017-05-16 | End: 2017-06-12 | Stop reason: ALTCHOICE

## 2017-05-16 RX ORDER — GABAPENTIN 300 MG/1
300 CAPSULE ORAL 3 TIMES DAILY
Status: DISCONTINUED | OUTPATIENT
Start: 2017-05-16 | End: 2017-05-16 | Stop reason: HOSPADM

## 2017-05-16 RX ORDER — GABAPENTIN 300 MG/1
300 CAPSULE ORAL 3 TIMES DAILY
Qty: 90 CAP | Refills: 0 | Status: SHIPPED | OUTPATIENT
Start: 2017-05-16 | End: 2017-06-12 | Stop reason: ALTCHOICE

## 2017-05-16 RX ADMIN — FAMOTIDINE 20 MG: 20 TABLET ORAL at 10:11

## 2017-05-16 RX ADMIN — ACETAMINOPHEN 1000 MG: 500 TABLET ORAL at 05:37

## 2017-05-16 RX ADMIN — OXYCODONE HYDROCHLORIDE 10 MG: 5 TABLET ORAL at 01:14

## 2017-05-16 RX ADMIN — Medication 10 ML: at 05:41

## 2017-05-16 RX ADMIN — CEFAZOLIN 2 G: 10 INJECTION, POWDER, FOR SOLUTION INTRAVENOUS; PARENTERAL at 05:37

## 2017-05-16 RX ADMIN — OXYCODONE HYDROCHLORIDE 10 MG: 5 TABLET ORAL at 06:42

## 2017-05-16 RX ADMIN — OXYCODONE HYDROCHLORIDE 10 MG: 5 TABLET ORAL at 04:05

## 2017-05-16 RX ADMIN — POLYETHYLENE GLYCOL 3350 17 G: 17 POWDER, FOR SOLUTION ORAL at 10:12

## 2017-05-16 RX ADMIN — DOCUSATE SODIUM AND SENNOSIDES 1 TABLET: 8.6; 5 TABLET, FILM COATED ORAL at 10:10

## 2017-05-16 RX ADMIN — LEVOTHYROXINE SODIUM 200 MCG: 100 TABLET ORAL at 05:38

## 2017-05-16 RX ADMIN — OXYCODONE HYDROCHLORIDE 10 MG: 5 TABLET ORAL at 10:10

## 2017-05-16 RX ADMIN — GABAPENTIN 300 MG: 300 CAPSULE ORAL at 10:10

## 2017-05-16 RX ADMIN — PAROXETINE 40 MG: 20 TABLET, FILM COATED ORAL at 10:10

## 2017-05-16 NOTE — DISCHARGE SUMMARY
Ortho Discharge Summary    Patient ID:  Ramone Mcintosh  [id number]  female  52 y.o.  1970    Admit date: 5/15/2017    Discharge date: 5/16/2017    Admitting Physician: Salud Fraire MD     Consulting Physician(s):   Treatment Team: Attending Provider: Salud Fraire MD; Utilization Review: Kayden Newsome RN    Date of Surgery:   5/15/2017     Preoperative Diagnosis:  CERVICALGIA, RADICULOPAHTY, SPONDYLOSIS WITH MYELOPATHY, HNP WITH MYELOPATHY, STENOSIS    Postoperative Diagnosis:   CERVICALGIA, RADICULOPAHTY, SPONDYLOSIS WITH MYELOPATHY, HNP WITH MYELOPATHY, STENOSIS    Procedure(s):     C3-6 ACDF     Anesthesia Type:   General     Surgeon: Salud Fraire MD                            HPI:  Pt is a 52 y.o. female who has a history of CERVICALGIA, RADICULOPAHTY, SPONDYLOSIS WITH MYELOPATHY, HNP WITH MYELOPATHY, STENOSIS  with pain and limitations of activities of daily living who presents at this time for a C3-4 ACDF following the failure of conservative management. PMH:   Past Medical History:   Diagnosis Date    Allergic rhinitis     COPD (chronic obstructive pulmonary disease) (HCC)     mild    Generalized anxiety disorder     Hypothyroidism     Psychiatric disorder        There is no height or weight on file to calculate BMI. : A BMI >30 is classified as Obesity. Medications upon admission :   Prior to Admission Medications   Prescriptions Last Dose Informant Patient Reported? Taking? PARoxetine (PAXIL) 40 mg tablet 5/14/2017 at Unknown time  No Yes   Sig: TAKE 1 TABLET BY MOUTH DAILY. acetaminophen (TYLENOL) 325 mg tablet 5/14/2017  Yes No   Sig: Take  by mouth every four (4) hours as needed for Pain. albuterol (PROVENTIL HFA, VENTOLIN HFA, PROAIR HFA) 90 mcg/actuation inhaler 5/8/2017  No No   Sig: Take 1 Puff by inhalation every four (4) hours as needed for Wheezing.    albuterol (PROVENTIL VENTOLIN) 2.5 mg /3 mL (0.083 %) nebulizer solution 5/8/2017  Yes No   Sig: by Nebulization route as needed for Wheezing. ibuprofen (MOTRIN IB) 200 mg tablet 5/1/2017  Yes No   Sig: Take 200 mg by mouth as needed. levothyroxine (SYNTHROID) 200 mcg tablet 5/13/2017  No No   Sig: Take 1 Tab by mouth Daily (before breakfast). Take with 50mcg daily for total 250mcg daily   loratadine (CLARITIN) 10 mg tablet 5/14/2017 at Unknown time  Yes Yes   Sig: Take 10 mg by mouth as needed. Facility-Administered Medications: None        Allergies: Allergies   Allergen Reactions    [facility]: The patient underwent surgery. Complications:  None; patient tolerated the procedure well. Was taken to the PACU in stable condition and then transferred to the ortho floor. Perioperative Antibiotics:  Ancef     Postoperative Pain Management:  Oxycodone      Postoperative transfusions:    Number of units banked PRBCs =   none     Post Op complications: none    Hemoglobin at discharge:    Lab Results   Component Value Date/Time    HGB 13.6 05/02/2017 11:30 AM    INR 1.0 05/02/2017 11:30 AM       Dressing was changed on POD # 1. Incision - clean, dry and intact. No significant erythema or swelling. Neurovascular exam found to be within normal limits. Wound appears to be healing without any evidence of infection. Pt had a HVAC drain that was removed on POD# 1. Physical Therapy started on the day following surgery and participated in bed mobility, transfers and ambulation. Gait:                      Discharged to: Home. Condition on Discharge:   stable    Discharge instructions:  - Take pain medications as prescribed  - Resume pre hospital diet      - Discharge activity: activity as tolerated  - Ambulate as tolerated  - Wound Care Keep wound clean and dry.   See discharge instruction sheet.  - Staples to be removed 10 days after surgery            -DISCHARGE MEDICATION LIST     Current Discharge Medication List      START taking these medications    Details   diazePAM (VALIUM) 5 mg tablet Take 1 Tab by mouth every six (6) hours as needed. Max Daily Amount: 20 mg.  Qty: 40 Tab, Refills: 0      gabapentin (NEURONTIN) 300 mg capsule Take 1 Cap by mouth three (3) times daily for 30 days. Qty: 90 Cap, Refills: 0      oxyCODONE IR (ROXICODONE) 5 mg immediate release tablet Take 1-2 Tabs by mouth every three (3) hours as needed. Max Daily Amount: 80 mg.  Qty: 80 Tab, Refills: 0      polyethylene glycol (MIRALAX) 17 gram packet Take 1 Packet by mouth daily as needed (constipation) for up to 15 days. Qty: 15 Packet, Refills: 0      senna-docusate (PERICOLACE) 8.6-50 mg per tablet Take 1 Tab by mouth daily. Qty: 30 Tab, Refills: 0         CONTINUE these medications which have NOT CHANGED    Details   PARoxetine (PAXIL) 40 mg tablet TAKE 1 TABLET BY MOUTH DAILY. Qty: 30 Tab, Refills: 2      loratadine (CLARITIN) 10 mg tablet Take 10 mg by mouth as needed. albuterol (PROVENTIL VENTOLIN) 2.5 mg /3 mL (0.083 %) nebulizer solution by Nebulization route as needed for Wheezing. albuterol (PROVENTIL HFA, VENTOLIN HFA, PROAIR HFA) 90 mcg/actuation inhaler Take 1 Puff by inhalation every four (4) hours as needed for Wheezing. Qty: 1 Inhaler, Refills: 2      levothyroxine (SYNTHROID) 200 mcg tablet Take 1 Tab by mouth Daily (before breakfast). Take with 50mcg daily for total 250mcg daily  Qty: 30 Tab, Refills: 3      ibuprofen (MOTRIN IB) 200 mg tablet Take 200 mg by mouth as needed. acetaminophen (TYLENOL) 325 mg tablet Take  by mouth every four (4) hours as needed for Pain.          per medical continuation form      -Follow up in office in 2 weeks      Signed:  Bonita Kapoor.  Les Franco, MSN, ACNP, ONP-C  Orthopaedic Nurse Practitioner    5/16/2017  8:08 AM

## 2017-05-16 NOTE — PROGRESS NOTES
Ortho / Neurosurgery NP Note    POD# 1  s/p C3-6 ACDF   Pt seen with Rowena Quinonez this morning    Pt sitting up in bed, just completed breakfast  No complaints. Pain well controlled except she has some tingling/occasional sharp pains in left hand/forearm    VSS Afebrile. Voiding status: + voiding independently. JULISSA drain in place    Labs  Lab Results   Component Value Date/Time    HGB 13.6 05/02/2017 11:30 AM      Lab Results   Component Value Date/Time    INR 1.0 05/02/2017 11:30 AM        There is no height or weight on file to calculate BMI. : A BMI >30 is classified as Obesity. Dressing c.d.i  Cryotherapy in place over incision  Calves soft and supple; No pain with passive stretch  Sensation and motor intact bilat UE 5/5  SCDs for mechanical DVT proph while in bed     PLAN:  1) Up ad jessica, family at bedside  2) Increase neurontin to help with neuropathic pain in arm  3) Plan d/c home today.     Huy Ramírez, NP

## 2017-05-16 NOTE — PROGRESS NOTES
Bedside and Verbal shift change report given to Rosalba RN  (oncoming nurse) by Catia Rajan (offgoing nurse). Report included the following information SBAR, Kardex, Intake/Output, MAR and Recent Results.

## 2017-05-16 NOTE — OP NOTES
[address], [address]   OP NOTE       Name:  Kourtney Rivas   MR#:  [id number]   :  1970   Account #:  [de-identified]    Surgery Date:  05/15/2017   Date of Adm:  05/15/2017       PREOPERATIVE DIAGNOSES   1. Cervical spinal stenosis. 2. Cervical disk herniation. 3. Cervicalgia. 4. Cervical radiculopathy. POSTOPERATIVE DIAGNOSES   1. Cervical spinal stenosis. 2. Cervical disk herniation. 3. Cervicalgia. 4. Cervical radiculopathy. PROCEDURES PERFORMED   1. C3 through C6 anterior cervical diskectomy and fusion x3.   2. C3, C4, C5, C6 anterior instrumentation. 3. Insertion of interbody biomechanical device C3-C6 x3.   4. Use of allograft bone for spine fusion. 5. Use of operative microscope. SURGEON: MD ROXANN Saez ASSISTANTRaymon Lyle. ESTIMATED BLOOD LOSS: 300 mL. COMPLICATIONS: None. SPECIMENS REMOVED: None. ANESTHESIA: General.    DRAINS: X1.    IMPLANTS: DePuy Synthes Stockett plate and the Forticore interbody   biomechanical device. INDICATIONS FOR PROCEDURE: The patient is a pleasant 53-year-  old lady with cervical spinal stenosis and cervical disk herniations,   resulting in a myeloradiculopathy that has failed to improve with   nonoperative treatment. At this point, she would like to proceed with   surgical intervention. I had given her warnings about possible   complications, including but not limited to pain, scar, bleeding,   infection, nonunion, damage to surrounding structures, death,   paralysis, blindness, stroke. She understands and wants to proceed. DESCRIPTION OF PROCEDURE: After informed consent was   obtained and the operative site was properly marked, the patient was   moved back in the operating room and underwent general   endotracheal anesthesia. She was positioned supine on the operating   room table using the Vionic Ranjith table flat top.  The arms were well   padded and tucked to the side. A bump was placed between the   shoulder blades. Head halter was applied with 10 pounds of skin   traction. Fluoroscopy was used to veena the level of the incision. We   then proceeded to prep and drape in the usual manner. Time-out was   obtained, verifying that this was the correct patient, the correct surgery,   the correct site as well as that she had received IV antibiotics. We then proceeded to perform a standard anterior approach to the   cervical spine, exposing the area between C3 and C6. Once that area   was exposed and hemostasis was obtained, I proceeded then to verify   that we were in the correct level with fluoroscopy, and then I used the   Synthes Midas Luis and a Percy Hook #1 to elevate the longus colli   muscles on both right and left sides, protecting the sympathetic chain   and fully exposing the spine. Scotland pins were then placed at C3 and   C4. With gentle extra distraction, a Shadow-Line was placed in the   longus colli muscles. The operative microscope was brought into the   field and kept in place for the remainder of the procedure. I then   proceeded to use a 15 blade to perform a box annulotomy and remove   the annulus with a pituitary. A curette and a pituitary was then used to   remove the disk and cartilaginous endplates all the way down to the   PLL. At that point, the endplates were made coplanar with a Midas Luis   and then I proceeded to use a micro nerve hook to find a rent in the   PLL and then removed with a Kerrison #1, followed by Kerrison #2   completely decompressing the thecal sac. Once that area was fully   decompressed, the space was sized to find an interbody spacer. With   appropriate size spacer selected, disk space was packed with Tete   allograft bone and then inserted in the interbody space.  The Scotland   pin was then removed from C3, moved down to C5, and the procedure   was repeated at the C4-C5 level and then finally repeated at the C5-C6 level. With all levels fully decompressed, interbody biomechanical   spacers inserted and the fusion in place, a Gardner plate was selected. The 12 mm screws were drilled bilaterally at C3, C4, C5, C6, final   tightened and locked into place with the final locking device. I then   proceeded to irrigate the wound with 3 L of normal saline, verified that   hemostasis was obtained. I proceeded then to place a deep drain. Closed the platysma with 2-0 Vicryl, the subcutaneous with 3-0 Vicryl,   the skin with a 3-0 running Monocryl and Dermabond. Sterile dressing   was applied. The patient was then awakened and transferred to the   PACU in stable condition. POSTOPERATIVE PLAN: The patient is going to remain here   overnight. We are going to give her SCDs and LINA hose for DVT   prophylaxis, and Ancef for infection prophylaxis.         MD SUSU Weller / MP   D:  05/15/2017   15:01   T:  05/15/2017   22:09   Job #:  [id number]

## 2017-05-16 NOTE — PROGRESS NOTES
Bedside and Verbal shift change report given to SUSAN Paredes (oncoming nurse) by Casimiro Drake (offgoing nurse). Report included the following information SBAR, Kardex, OR Summary, Procedure Summary, Intake/Output, MAR, Recent Results and Med Rec Status.

## 2017-05-16 NOTE — PROGRESS NOTES
Patient is requesting nicotine patch to start now. Paged Cris Estevez called back.  Gave telephone order to start now

## 2017-05-16 NOTE — DISCHARGE INSTRUCTIONS
[address]    Discharge Instruction Sheet: Anterior Cervical Fusion    DR. Marietta Ferreira    Pain control:  Typically, we will prescribe a narcotic usually 1-2 tabs every four hours is sufficient for the pain. Most patients need this only for the first few weeks. You should discontinue this as the pain decreases. You should not drive while taking any narcotic pain medications. Constipation  Pain medicines and anesthesia can be constipating-this can be prevented by gentle physical activity and drinking plenty of fluid. It should be treated with over-the-counter medications such as Miralax or suppositories, and/or Fleets enema. You should have a bowel movement at least every other day following surgery. Incision care   Keep this area clean and dry. Do not remove the dressing. DO NOT take a tub bath or go swimming until cleared by your doctor. DO NOT apply lotions, oils, or creams to incision. Cover the wound with an impermiable dressing to shower for then next 5 days, then no cover is needed. Wear cervical collar for comfort. If staples are in place, they should be removed about 14-20 days after surgery. To increase and promote healing:   Stop Smoking (or at least cut back on smoking).  Eat a well-balanced diet (high in protein and vitamin C)   If your appetite is poor, consider nutritional supplements like Ensure, Glucerna, or [place] Instant Breakfast.   If you are diabetic, controlling you blood sugars is very important to prevent infection and promote wound healing. Nutrition:   If you were on a supplement such as Ensure or Glucerna) while in the hospital, please continue using them with each meal for the next 30 days.    Eat a well-balanced diet - High in protein, high in vitamins and minerals, especially vitamin C and zinc.     Restrictions:  Limited bending at waist  Lift no more than 10 pounds    Warning signs :   Please call your physician IMMEDIATELY at [phone] if you have:   If you have throat soreness that worsens   If you have difficulty swallowing.  If you have any difficulty breathing   Bleeding from incision that is constant.  Change in mental status (unusual behavior or confusion)   If your incision develops redness or swelling   Change in wound drainage (increase in amount, color, or foul odor)   [place] over 101.5 degrees Fahrenheit    Headache that is not relieved with pain medication   Tenderness or redness in the calf of your leg    Emergency: CALL 911 if you have:   Any Difficulty Breathing or Shortness of breath   Difficulty Swallowing   Chest pain   Localized chest pain when coughing or taking a deep breath    Eddi Dash    Follow-up  Please call Dr. Amy Davison office for a follow up appointment in 2 weeks at [phone]. You can return to work when cleared by a physician. During normal business hours you may reach Dr. Bethany Raphael' team directly at [phone] if you have concerns or questions.

## 2017-05-17 ENCOUNTER — PATIENT OUTREACH (OUTPATIENT)
Dept: INTERNAL MEDICINE CLINIC | Age: 47
End: 2017-05-17

## 2017-05-26 ENCOUNTER — PATIENT OUTREACH (OUTPATIENT)
Dept: INTERNAL MEDICINE CLINIC | Age: 47
End: 2017-05-26

## 2017-06-12 ENCOUNTER — OFFICE VISIT (OUTPATIENT)
Dept: INTERNAL MEDICINE CLINIC | Age: 47
End: 2017-06-12

## 2017-06-12 VITALS
TEMPERATURE: 98.7 F | HEIGHT: 64 IN | RESPIRATION RATE: 16 BRPM | BODY MASS INDEX: 22.61 KG/M2 | WEIGHT: 132.4 LBS | HEART RATE: 96 BPM | DIASTOLIC BLOOD PRESSURE: 78 MMHG | OXYGEN SATURATION: 94 % | SYSTOLIC BLOOD PRESSURE: 123 MMHG

## 2017-06-12 DIAGNOSIS — J44.1 COPD EXACERBATION (HCC): Primary | ICD-10-CM

## 2017-06-12 RX ORDER — PREDNISONE 20 MG/1
TABLET ORAL
COMMUNITY
Start: 2017-06-10 | End: 2017-06-14 | Stop reason: ALTCHOICE

## 2017-06-12 RX ORDER — CEFDINIR 300 MG/1
CAPSULE ORAL
COMMUNITY
Start: 2017-06-10 | End: 2018-02-05 | Stop reason: ALTCHOICE

## 2017-06-12 NOTE — PATIENT INSTRUCTIONS
Patient Instructions  1. Start taking Mucinex 600 mg twice a day to loosen congestion in chest.   2. Change albuterol nebulizer from 3 times a day to every 4 hours while awake. 3. Do not use albuterol inhaler in between unless needed. 4. Okay to increase Paxil 40 mg to 1.5 tabs daily over the next week. Chronic Obstructive Pulmonary Disease (COPD) Flare-Ups: Care Instructions  Your Care Instructions    Chronic obstructive pulmonary disease (COPD) is a lung disease that makes it hard to breathe. It is caused by damage to the lungs over many years, usually from smoking. COPD is often a mix of two diseases:  · Chronic bronchitis: The airways that carry air to the lungs (bronchial tubes) get inflamed and make a lot of mucus. This can narrow or block the airways. · Emphysema: In a healthy person, the tiny air sacs in the lungs are like balloons. As you breathe in and out, they get bigger and smaller to move air through your lungs. But with emphysema, these air sacs are damaged and lose their stretch. Less air gets in and out of the lungs. Many people with COPD have attacks called flare-ups or exacerbations. This is when your usual symptoms quickly get worse and stay worse. The doctor has checked you carefully. But problems can develop later. If you notice any problems or new symptoms, get medical treatment right away. Follow-up care is a key part of your treatment and safety. Be sure to make and go to all appointments, and call your doctor if you are having problems. It's also a good idea to know your test results and keep a list of the medicines you take. How can you care for yourself at home? · Be safe with medicines. Take your medicines exactly as prescribed. Call your doctor if you think you are having a problem with your medicine. You may be taking medicines such as:  ¨ Bronchodilators. These help open your airways and make breathing easier. ¨ Corticosteroids. These reduce airway inflammation.  They may be given as pills, in a vein, or in an inhaled form. You may go home with pills in addition to an inhaler that you already use. · A spacer may help you get more inhaled medicine to your lungs. Ask your doctor or pharmacist if a spacer is right for you. If it is, ask how to use it properly. · If your doctor prescribed antibiotics, take them as directed. Do not stop taking them just because you feel better. You need to take the full course of antibiotics. · If your doctor prescribed oxygen, use the flow rate your doctor has recommended. Do not change it without talking to your doctor first.  · Do not smoke. Smoking makes COPD worse. If you need help quitting, talk to your doctor about stop-smoking programs and medicines. These can increase your chances of quitting for good. When should you call for help? Call 911 anytime you think you may need emergency care. For example, call if:  · You have severe trouble breathing. Call your doctor now or seek immediate medical care if:  · You have new or worse trouble breathing. · Your coughing or wheezing gets worse. · You cough up dark brown or bloody mucus (sputum). · You have a new or higher fever. Watch closely for changes in your health, and be sure to contact your doctor if:  · You notice more mucus or a change in the color of your mucus. · You need to use your antibiotic or steroid pills. · You do not get better as expected. Where can you learn more? Go to http://steffi-magdalena.info/. Enter C141 in the search box to learn more about \"Chronic Obstructive Pulmonary Disease (COPD) Flare-Ups: Care Instructions. \"  Current as of: July 21, 2016  Content Version: 11.2  © 5133-3790 [employer]. Care instructions adapted under license by [employer] (which disclaims liability or warranty for this information).  If you have questions about a medical condition or this instruction, always ask your healthcare professional. [employer] disclaims any warranty or liability for your use of this information.

## 2017-06-12 NOTE — PROGRESS NOTES
CC:   Chief Complaint   Patient presents with    Nasal Congestion     Room A// [facility] visit on Sat// sx started last Wed    COPD     excessive, tight cough// has limited smoking since Friday// last neb tx was @ 9:30 am// albuterol inhaler used approx noon       HISTORY OF PRESENT ILLNESS  Mini Johns is a 52 y.o. female. Presents for evaluation of COPD exacerbation. Seen at [facility] on 6/10/17 with cough and wheezing that started 5 days ago. Diagnosed with COPD exacerbation and started on cefdinir for 10 days, prednisone taper, and albuterol nebulizer treatments 3 times a day. Uses albuterol inhaler in between nebulizer treatments. She complains of still having much dyspnea and wheezing, cough, and fatigue. Feels very tight at chest. Increased anxiety. Has shakiness after using albuterol. Smoking less over past few days. Denies history of pneumonia.     ROS  Constitutional: negative for fevers, chills  ENT:   negative for sore throat, nasal congestion, ear pains, hoarseness  Respiratory:  negative for hemoptysis  CV:   negative for palpitations, lower extremity edema; positive for chest tightness  GI:   negative for heartburn, abd pain, nausea, vomiting, diarrhea, constipation  Neurological:  negative for headaches, dizziness, gait problems  Behavl/Psych: negative for feelings of  depression     Patient Active Problem List   Diagnosis Code    Hypothyroidism E03.9    Generalized anxiety disorder F41.1    COPD [facility]ch[facility] obstructive pulmonary disease) ([facility]) J44.9    Tobacco use Z72.0    Numbness in both hands R20.0     Past Medical History:   Diagnosis Date    Allergic rhinitis     COPD (chronic obstructive pulmonary disease) ([facility])     mild    Generalized anxiety disorder     Hypothyroidism     Psychiatric disorder      Allergies   Allergen Reactions    Hydrocodone Hives     Current Outpatient Prescriptions   Medication Sig Dispense Refill    cefdinir (OMNICEF) 300 mg capsule       predniSONE (DELTASONE) 20 mg tablet       albuterol (PROVENTIL VENTOLIN) 2.5 mg /3 mL (0.083 %) nebulizer solution by Nebulization route as needed for Wheezing.  albuterol (PROVENTIL HFA, VENTOLIN HFA, PROAIR HFA) 90 mcg/actuation inhaler Take 1 Puff by inhalation every four (4) hours as needed for Wheezing. 1 Inhaler 2    PARoxetine (PAXIL) 40 mg tablet TAKE 1 TABLET BY MOUTH DAILY. 30 Tab 2    levothyroxine (SYNTHROID) 200 mcg tablet Take 1 Tab by mouth Daily (before breakfast). Take with 50mcg daily for total 250mcg daily 30 Tab 3    ibuprofen (MOTRIN IB) 200 mg tablet Take 200 mg by mouth as needed.  acetaminophen (TYLENOL) 325 mg tablet Take  by mouth every four (4) hours as needed for Pain.  loratadine (CLARITIN) 10 mg tablet Take 10 mg by mouth as needed. PHYSICAL EXAM  Visit Vitals    /78 (BP 1 Location: Left arm, BP Patient Position: Sitting)    Pulse 96    Temp 98.7 °F (37.1 °C) (Oral)    Resp 16    Ht 5' 4\" (1.626 m)    Wt 132 lb 6.4 oz (60.1 kg)    LMP 05/22/2017 (Approximate)    SpO2 94%    BMI 22.73 kg/m2       General: Well-developed and well-nourished, no distress. Able to talk in complete sentences. No accessory respiratory muscle use. HEENT:  Head normocephalic/atraumatic, no scleral icterus or conjunctival injection. Oropharynx benign. No sinus tenderness. TM's and ear canals normal bilaterally. Neck: Supple. No lymphadenopathy. Lungs:  Significantly decreased breath sounds with expiratory wheezes throughout. No crackles. Heart:  Regular rate and rhythm, normal S1 and S2, no murmur, gallop, or rub  Extremities: No clubbing, cyanosis, or edema. Neurological: Alert and oriented. Psychiatric: Normal mood and affect. Behavior is normal.       ASSESSMENT AND PLAN    ICD-10-CM ICD-9-CM    1.  COPD exacerbation (HCC) J44.1 491.21 methylPREDNISolone, PF, (SOLU-MEDROL) 125 mg/2 mL solr      METHYLPREDNISOLONE INJECTION      CT THER/PROPH/DIAG INJECTION, SUBCUT/IM       Anjali Rome was seen today for nasal congestion and copd. Diagnoses and all orders for this visit:    COPD exacerbation ([facility]  Given Solumedrol 125 mg IM and albuterol nebulizer treatment in clinic today. Breathing felt better with these measures. Continue prednisone taper and Omnicef (cefdinir). -     methylPREDNISolone, PF, (SOLU-MEDROL) 125 mg/2 mL solr; 2 mL by IntraVENous route once for 1 dose. -     METHYLPREDNISOLONE INJECTION (Qty 2)  -     THER/PROPH/DIAG INJECTION, SUBCUT/IM  Patient Instructions  1. Start taking Mucinex 600 mg twice a day to loosen congestion in chest.   2. Change albuterol nebulizer from 3 times a day to every 4 hours while awake. 3. Do not use albuterol inhaler in between unless needed. 4. Okay to increase Paxil 40 mg to 1.5 tabs daily over the next week. Follow-up Disposition:  Return in about 1 week (around 6/19/2017), or if symptoms worsen or fail to improve, for COPD exacerbation (follow-up with Dr. Chantal Mancilla or Dr. Vivienne Baum). Provided patient and/or family with advanced directive information and answered pertinent questions. Encouraged patient to provide a copy of advanced directive to the office when available. I have discussed the diagnosis with the patient and the intended plan as seen in the above orders. Patient is in agreement. The patient has received an after-visit summary and questions were answered concerning future plans. I have discussed medication side effects and warnings with the patient as well.

## 2017-06-12 NOTE — PROGRESS NOTES
Patient received paperwork for advance directive during previous visit but has not completed at this time     Reviewed record In preparation for visit and have obtained necessary documentation      1. Have you been to the ER, urgent care clinic since your last visit? Hospitalized since your last visit? NO    2. Have you seen or consulted any other health care providers outside of the [address] [place] [address] since your last visit? Include any pap smears or colon screening. [facility] on Sat.

## 2017-06-12 NOTE — MR AVS SNAPSHOT
Visit Information Date & Time Provider Department Dept. Phone Encounter #  
 6/12/2017  2:40 PM Mitzi Roldan MD [facility] [phone] [id number] Follow-up Instructions Return in about 1 week (around 6/19/2017), or if symptoms worsen or fail to improve, for COPD exacerbation (follow-up with Dr. Khalida Patterson or Dr. Anoop Disla). Upcoming Health Maintenance Date Due INFLUENZA AGE 9 TO ADULT 8/1/2017 PAP AKA CERVICAL CYTOLOGY 5/18/2018 DTaP/Tdap/Td series (2 - Td) 8/1/2018 Allergies as of 6/12/2017  Review Complete On: 6/12/2017 By: Mitzi Roldan MD  
  
 Severity Noted Reaction Type Reactions Hydrocodone  03/10/2010    Hives Current Immunizations  Reviewed on 2/6/2017 Name Date H1N1 FLU VACCINE 3/10/2010 Influenza Vaccine 9/1/2014, 8/21/2013 Influenza Vaccine (Quad) PF 2/6/2017 Pneumococcal Vaccine (Unspecified Type) 3/10/2010 TDAP Vaccine 8/1/2008 Not reviewed this visit You Were Diagnosed With   
  
 Codes Comments [facility]    -  Primary ICD-10-CM: J44.1 ICD-9-CM: 491.21 Vitals BP Pulse Temp Resp Height(growth percentile) Weight(growth percentile) 123/78 (BP 1 Location: Left arm, BP Patient Position: Sitting) 96 98.7 °F (37.1 °C) (Oral) 16 5' 4\" (1.626 m) 132 lb 6.4 oz (60.1 kg) LMP SpO2 BMI OB Status Smoking Status 05/22/2017 (Approximate) 94% 22.73 kg/m2 Having regular periods Current Every Day Smoker Vitals History BMI and BSA Data Body Mass Index Body Surface Area  
 22.73 kg/m 2 1.65 m 2 Preferred Pharmacy Pharmacy Name Phone [id number] [address] [address] [phone] Your Updated Medication List  
  
   
This list is accurate as of: 6/12/17  3:31 PM.  Always use your most recent med list.  
  
  
  
  
 * albuterol 2.5 mg /3 mL (0.083 %) nebulizer solution Commonly known as:  PROVENTIL VENTOLIN  
by Nebulization route as needed for Wheezing. * albuterol 90 mcg/actuation inhaler Commonly known as:  PROVENTIL HFA, VENTOLIN HFA, PROAIR HFA Take 1 Puff by inhalation every four (4) hours as needed for Wheezing. cefdinir 300 mg capsule Commonly known as:  OMNICEF  
  
 CLARITIN 10 mg tablet Generic drug:  loratadine Take 10 mg by mouth as needed. levothyroxine 200 mcg tablet Commonly known as:  SYNTHROID Take 1 Tab by mouth Daily (before breakfast). Take with 50mcg daily for total 250mcg daily  
  
 methylPREDNISolone (PF) 125 mg/2 mL Solr Commonly known as:  SOLU-MEDROL 2 mL by IntraVENous route once for 1 dose. MOTRIN  mg tablet Generic drug:  ibuprofen Take 200 mg by mouth as needed. PARoxetine 40 mg tablet Commonly known as:  PAXIL TAKE 1 TABLET BY MOUTH DAILY. predniSONE 20 mg tablet Commonly known as:  DELTASONE  
  
 TYLENOL 325 mg tablet Generic drug:  acetaminophen Take  by mouth every four (4) hours as needed for Pain. * Notice: This list has 2 medication(s) that are the same as other medications prescribed for you. Read the directions carefully, and ask your doctor or other care provider to review them with you. We Performed the Following METHYLPREDNISOLONE INJECTION [ [facility]] IN THER/PROPH/DIAG INJECTION, SUBCUT/IM [id number] CPT(R)] Follow-up Instructions Return in about 1 week (around 6/19/2017), or if symptoms worsen or fail to improve, for COPD exacerbation (follow-up with Dr. Bhanu Ambrose or Dr. Dian Majano). Patient Instructions Patient Instructions 1. Start taking Mucinex 600 mg twice a day to loosen congestion in chest.  
2. Change albuterol nebulizer from 3 times a day to every 4 hours while awake. 3. Do not use albuterol inhaler in between unless needed. Chronic Obstructive Pulmonary Disease (COPD) Flare-Ups: Care Instructions Your Care Instructions Chronic obstructive pulmonary disease (COPD) is a lung disease that makes it hard to breathe. It is caused by damage to the lungs over many years, usually from smoking. COPD is often a mix of two diseases: · Chronic bronchitis: The airways that carry air to the lungs (bronchial tubes) get inflamed and make a lot of mucus. This can narrow or block the airways. · Emphysema: In a healthy person, the tiny air sacs in the lungs are like balloons. As you breathe in and out, they get bigger and smaller to move air through your lungs. But with emphysema, these air sacs are damaged and lose their stretch. Less air gets in and out of the lungs. Many people with COPD have attacks called flare-ups or exacerbations. This is when your usual symptoms quickly get worse and stay worse. The doctor has checked you carefully. But problems can develop later. If you notice any problems or new symptoms, get medical treatment right away. Follow-up care is a key part of your treatment and safety. Be sure to make and go to all appointments, and call your doctor if you are having problems. It's also a good idea to know your test results and keep a list of the medicines you take. How can you care for yourself at home? · Be safe with medicines. Take your medicines exactly as prescribed. Call your doctor if you think you are having a problem with your medicine. You may be taking medicines such as: ¨ Bronchodilators. These help open your airways and make breathing easier. ¨ Corticosteroids. These reduce airway inflammation. They may be given as pills, in a vein, or in an inhaled form. You may go home with pills in addition to an inhaler that you already use. · A spacer may help you get more inhaled medicine to your lungs. Ask your doctor or pharmacist if a spacer is right for you. If it is, ask how to use it properly. · If your doctor prescribed antibiotics, take them as directed.  Do not stop taking them just because you feel better. You need to take the full course of antibiotics. · If your doctor prescribed oxygen, use the flow rate your doctor has recommended. Do not change it without talking to your doctor first. 
· Do not smoke. Smoking makes COPD worse. If you need help quitting, talk to your doctor about stop-smoking programs and medicines. These can increase your chances of quitting for good. When should you call for help? Call 911 anytime you think you may need emergency care. For example, call if: 
· You have severe trouble breathing. Call your doctor now or seek immediate medical care if: 
· You have new or worse trouble breathing. · Your coughing or wheezing gets worse. · You cough up dark brown or bloody mucus (sputum). · You have a new or higher fever. Watch closely for changes in your health, and be sure to contact your doctor if: 
· You notice more mucus or a change in the color of your mucus. · You need to use your antibiotic or steroid pills. · You do not get better as expected. Where can you learn more? Go to http://steffi-magdalena.info/. Enter N698 in the search box to learn more about \"Chronic Obstructive Pulmonary Disease (COPD) Flare-Ups: Care Instructions. \" Current as of: July 21, 2016 Content Version: 11.2 © 1871-1772 [employer]. Care instructions adapted under license by [employer] (which disclaims liability or warranty for this information). If you have questions about a medical condition or this instruction, always ask your healthcare professional. [place] [address] any warranty or liability for your use of this information. Introducing \[facility]\"" & HEALTH SERVICES! Dear Divine Montoya: Thank you for requesting a FittingRoom account. Our records indicate that you have previously registered for a FittingRoom account but its currently inactive. Please call our FittingRoom support line at [phone]. Additional Information If you have questions, please visit the Frequently Asked Questions section of the [employer] website at [email or web address] Remember, [facility] is NOT to be used for urgent needs. For medical emergencies, dial 911. Now available from your iPhone and Android! Please provide this summary of care documentation to your next provider. Your primary care clinician is listed as Lloyd Freeman. If you have any questions after today's visit, please call [phone].

## 2017-06-13 RX ORDER — ALBUTEROL SULFATE 0.83 MG/ML
2.5 SOLUTION RESPIRATORY (INHALATION) ONCE
Qty: 1 EACH | Refills: 0
Start: 2017-06-13 | End: 2017-06-13

## 2017-06-14 ENCOUNTER — OFFICE VISIT (OUTPATIENT)
Dept: INTERNAL MEDICINE CLINIC | Age: 47
End: 2017-06-14

## 2017-06-14 VITALS
WEIGHT: 135 LBS | DIASTOLIC BLOOD PRESSURE: 71 MMHG | OXYGEN SATURATION: 94 % | TEMPERATURE: 98.2 F | HEIGHT: 64 IN | RESPIRATION RATE: 20 BRPM | BODY MASS INDEX: 23.05 KG/M2 | HEART RATE: 103 BPM | SYSTOLIC BLOOD PRESSURE: 117 MMHG

## 2017-06-14 DIAGNOSIS — J20.8 ACUTE BACTERIAL BRONCHITIS: ICD-10-CM

## 2017-06-14 DIAGNOSIS — B96.89 ACUTE BACTERIAL BRONCHITIS: ICD-10-CM

## 2017-06-14 DIAGNOSIS — J44.1 COPD EXACERBATION (HCC): Primary | ICD-10-CM

## 2017-06-14 RX ORDER — IPRATROPIUM BROMIDE AND ALBUTEROL SULFATE 2.5; .5 MG/3ML; MG/3ML
3 SOLUTION RESPIRATORY (INHALATION)
Qty: 60 NEBULE | Refills: 2 | Status: SHIPPED | OUTPATIENT
Start: 2017-06-14 | End: 2020-03-03 | Stop reason: SDUPTHER

## 2017-06-14 RX ORDER — PREDNISONE 20 MG/1
TABLET ORAL
Qty: 10 TAB | Refills: 0 | Status: SHIPPED | OUTPATIENT
Start: 2017-06-14 | End: 2018-02-05 | Stop reason: ALTCHOICE

## 2017-06-14 RX ORDER — LEVOFLOXACIN 750 MG/1
750 TABLET ORAL DAILY
Qty: 5 TAB | Refills: 0 | Status: SHIPPED | OUTPATIENT
Start: 2017-06-14 | End: 2017-06-19

## 2017-06-14 NOTE — PROGRESS NOTES
CC:   Chief Complaint   Patient presents with    Shortness of Breath       HISTORY OF PRESENT ILLNESS  Tiara Mobley is a 52 y.o. female. Seen 2 days ago for COPD exacerbation. Taking cefdinir for 10 days, prednisone taper, and albuterol nebulizer treatments every 4 hrs. States she is still very short of breath. No improvement since last clinic visit. Has not been back to work because of this. Denies fevers or chills. Patient Active Problem List   Diagnosis Code    Hypothyroidism E03.9    Generalized anxiety disorder F41.1    COPD (chronic obstructive pulmonary disease) ([facility]) J44.9    Tobacco use Z72.0    Numbness in both hands R20.0     Past Medical History:   Diagnosis Date    Allergic rhinitis     COPD (chronic obstructive pulmonary disease) ([facility])     mild    Generalized anxiety disorder     Hypothyroidism     Psychiatric disorder      Allergies   Allergen Reactions    Hydrocodone Hives     Current Outpatient Prescriptions   Medication Sig Dispense Refill    cefdinir (OMNICEF) 300 mg capsule       predniSONE (DELTASONE) 20 mg tablet       albuterol (PROVENTIL VENTOLIN) 2.5 mg /3 mL (0.083 %) nebulizer solution by Nebulization route as needed for Wheezing.  albuterol (PROVENTIL HFA, VENTOLIN HFA, PROAIR HFA) 90 mcg/actuation inhaler Take 1 Puff by inhalation every four (4) hours as needed for Wheezing. 1 Inhaler 2    PARoxetine (PAXIL) 40 mg tablet TAKE 1 TABLET BY MOUTH DAILY. 30 Tab 2    levothyroxine (SYNTHROID) 200 mcg tablet Take 1 Tab by mouth Daily (before breakfast). Take with 50mcg daily for total 250mcg daily 30 Tab 3    ibuprofen (MOTRIN IB) 200 mg tablet Take 200 mg by mouth as needed.  acetaminophen (TYLENOL) 325 mg tablet Take  by mouth every four (4) hours as needed for Pain.  loratadine (CLARITIN) 10 mg tablet Take 10 mg by mouth as needed.            PHYSICAL EXAM  Visit Vitals    /71 (BP 1 Location: Left arm, BP Patient Position: Sitting)    Pulse (!) 103    Temp 98.2 °F (36.8 °C) (Oral)    Resp 20    Ht 5' 4\" (1.626 m)    Wt 135 lb (61.2 kg)    LMP 05/22/2017 (Approximate)    SpO2 94%    BMI 23.17 kg/m2       General: Well-developed and well-nourished, no distress. Able to talk in complete sentences. No accessory respiratory muscle use. HEENT: Head normocephalic/atraumatic. Neck: Supple. No lymphadenopathy. Lungs:  Decreased breath sounds with expiratory wheezes throughout. No crackles. Heart: Regular rate and rhythm, normal S1 and S2, no murmur, gallop, or rub  Extremities: No clubbing, cyanosis, or edema. Neurological: Alert and oriented. Psychiatric: Normal mood and affect. Behavior is normal.         ASSESSMENT AND PLAN    ICD-10-CM ICD-9-CM    1. COPD exacerbation (HCC) J44.1 491.21 XR CHEST PA LAT      levoFLOXacin (LEVAQUIN) 750 mg tablet      predniSONE (DELTASONE) 20 mg tablet      albuterol-ipratropium (DUO-NEB) 2.5 mg-0.5 mg/3 ml nebu   2. Acute bacterial bronchitis J20.8 466.0     B96.89 041.9        Jennifer Patel was seen today for shortness of breath. Diagnoses and all orders for this visit:    COPD exacerbation (HCC)/Acute bacterial bronchitis    Received albuterol-ipratropium nebulizer treatment in clinic. Stop prednisone taper, Omnicef, and albuterol nebulizer. Change to higher dose of prednisone, levofloxacin, and albuterol-ipratropium nebulizer treatments at home. -     XR CHEST PA LAT; Future  -     Start levoFLOXacin (LEVAQUIN) 750 mg tablet; Take 1 Tab by mouth daily for 5 days.  -     Start predniSONE (DELTASONE) 20 mg tablet; Take 2 tablets by mouth once daily for 5 days. Indications: COPD exacerbation  -     Start albuterol-ipratropium (DUO-NEB) 2.5 mg-0.5 mg/3 ml nebu; 3 mL by Nebulization route every four (4) hours as needed.     Addendum: CXR returned normal.    Follow-up Disposition:  Return in about 1 month (around 7/14/2017), or if symptoms worsen or fail to improve, for Schedule with Dr. [facility] for COPD, anxiety, hypothyroidism. Provided patient and/or family with advanced directive information and answered pertinent questions. Encouraged patient to provide a copy of advanced directive to the office when available. I have discussed the diagnosis with the patient and the intended plan as seen in the above orders. Patient is in agreement. The patient has received an after-visit summary and questions were answered concerning future plans. I have discussed medication side effects and warnings with the patient as well.

## 2017-06-14 NOTE — PROGRESS NOTES
Reviewed record  In preparation for visit and have obtained necessary documentation. 1. Have you been to the ER, urgent care clinic since your last visit? Hospitalized since your last visit?seen at [facility]  2. Have you seen or consulted any other health care providers outside of the [address] [place] [address] since your last visit? Include any pap smears or colon screening. No  Patient has been given information on advanced directives at a previous visit.

## 2017-06-14 NOTE — MR AVS SNAPSHOT
Visit Information Date & Time Provider Department Dept. Phone Encounter #  
 6/14/2017  1:20 PM Toña Gaviria MD Rocio Angelo [phone] [id number] Follow-up Instructions Return in about 1 month (around 7/14/2017), or if symptoms worsen or fail to improve, for Schedule with Dr. [facility] for COPD, anxiety, hypothyroidism. Upcoming Health Maintenance Date Due INFLUENZA AGE 9 TO ADULT 8/1/2017 PAP AKA CERVICAL CYTOLOGY 5/18/2018 DTaP/Tdap/Td series (2 - Td) 8/1/2018 Allergies as of 6/14/2017  Review Complete On: 6/14/2017 By: Toña Gaviria MD  
  
 Severity Noted Reaction Type Reactions Hydrocodone  03/10/2010    Hives Current Immunizations  Reviewed on 2/6/2017 Name Date H1N1 FLU VACCINE 3/10/2010 Influenza Vaccine 9/1/2014, 8/21/2013 Influenza Vaccine (Quad) PF 2/6/2017 Pneumococcal Vaccine (Unspecified Type) 3/10/2010 TDAP Vaccine 8/1/2008 Not reviewed this visit You Were Diagnosed With   
  
 Codes Comments [facility] exacerbation ([facility]    -  Primary ICD-10-CM: J44.1 ICD-9-CM: 491.21 Acute bacterial bronchitis     ICD-10-CM: J20.8, B96.89 
ICD-9-CM: 466.0, 041.9 Vitals BP Pulse Temp Resp Height(growth percentile) Weight(growth percentile) 117/71 (BP 1 Location: Left arm, BP Patient Position: Sitting) (!) 103 98.2 °F (36.8 °C) (Oral) 20 5' 4\" (1.626 m) 135 lb (61.2 kg) LMP SpO2 BMI OB Status Smoking Status 05/22/2017 (Approximate) 94% 23.17 kg/m2 Having regular periods Current Every Day Smoker BMI and BSA Data Body Mass Index Body Surface Area  
 23.17 kg/m 2 1.66 m 2 Preferred Pharmacy Pharmacy Name Phone [id number] [address] [place] [address] [phone] Your Updated Medication List  
  
   
This list is accurate as of: 6/14/17  2:41 PM.  Always use your most recent med list.  
  
  
  
  
 * albuterol 2.5 mg /3 mL (0.083 %) nebulizer solution Commonly known as:  PROVENTIL VENTOLIN  
by Nebulization route as needed for Wheezing. * albuterol 90 mcg/actuation inhaler Commonly known as:  PROVENTIL HFA, VENTOLIN HFA, PROAIR HFA Take 1 Puff by inhalation every four (4) hours as needed for Wheezing. albuterol-ipratropium 2.5 mg-0.5 mg/3 ml Nebu Commonly known as:  DUO-NEB  
3 mL by Nebulization route every four (4) hours as needed. cefdinir 300 mg capsule Commonly known as:  OMNICEF  
  
 CLARITIN 10 mg tablet Generic drug:  loratadine Take 10 mg by mouth as needed. levoFLOXacin 750 mg tablet Commonly known as:  Jeremi Gills Take 1 Tab by mouth daily for 5 days. levothyroxine 200 mcg tablet Commonly known as:  SYNTHROID Take 1 Tab by mouth Daily (before breakfast). Take with 50mcg daily for total 250mcg daily MOTRIN  mg tablet Generic drug:  ibuprofen Take 200 mg by mouth as needed. PARoxetine 40 mg tablet Commonly known as:  PAXIL TAKE 1 TABLET BY MOUTH DAILY. predniSONE 20 mg tablet Commonly known as:  Denisa Hoganer Take 2 tablets by mouth once daily for 5 days. Indications: COPD exacerbation TYLENOL 325 mg tablet Generic drug:  acetaminophen Take  by mouth every four (4) hours as needed for Pain. * Notice: This list has 2 medication(s) that are the same as other medications prescribed for you. Read the directions carefully, and ask your doctor or other care provider to review them with you. Prescriptions Sent to Pharmacy Refills  
 levoFLOXacin (LEVAQUIN) 750 mg tablet 0 Sig: Take 1 Tab by mouth daily for 5 days. Class: Normal  
 Pharmacy: [facility] #[id number] Marivel Roldan, [address] Ph #: [phone] Route: Oral  
 predniSONE (DELTASONE) 20 mg tablet 0 Sig: Take 2 tablets by mouth once daily for 5 days. Indications: COPD exacerbation Class: Normal  
 Pharmacy: [facility]/pharmacy #[id number] [place], [place] - [address] Ph #: [phone]  
 albuterol-ipratropium (DUO-NEB) 2.5 mg-0.5 mg/3 ml nebu 2 Sig: 3 mL by Nebulization route every four (4) hours as needed. Class: Normal  
 Pharm[id number] [address] Ph #: [phone] Route: Nebulization Follow-up Instructions Return in about 1 month (around 7/14/2017), or if symptoms worsen or fail to improve, for Schedule with Dr. Bhanu Ambrose for COPD, anxiety, hypothyroidism. To-Do List   
 06/14/2017 Imaging:  XR CHEST PA LAT [facility] SERVICES! Dear Nabor Smith: Thank you for requesting a Viki account. Our records indicate that you have previously registered for a Viki account but its currently inactive. Please call our Viki support line at [phone]. Additional Information If you have questions, please visit the Frequently Asked Questions section of the Viki website at [email or web address] Remember, Viki is NOT to be used for urgent needs. For medical emergencies, dial 911. Now available from your iPhone and Android! Please provide this summary of care documentation to your next provider. Your primary care clinician is listed as Lloyd Freeman. If you have any questions after today's visit, please call [phone].

## 2017-06-19 ENCOUNTER — PATIENT OUTREACH (OUTPATIENT)
Dept: INTERNAL MEDICINE CLINIC | Age: 47
End: 2017-06-19

## 2018-02-05 ENCOUNTER — OFFICE VISIT (OUTPATIENT)
Dept: INTERNAL MEDICINE CLINIC | Age: 48
End: 2018-02-05

## 2018-02-05 VITALS
DIASTOLIC BLOOD PRESSURE: 75 MMHG | HEIGHT: 64 IN | HEART RATE: 75 BPM | RESPIRATION RATE: 14 BRPM | WEIGHT: 147 LBS | TEMPERATURE: 98.1 F | SYSTOLIC BLOOD PRESSURE: 121 MMHG | BODY MASS INDEX: 25.1 KG/M2 | OXYGEN SATURATION: 97 %

## 2018-02-05 DIAGNOSIS — M48.02 SPINAL STENOSIS IN CERVICAL REGION: ICD-10-CM

## 2018-02-05 DIAGNOSIS — F41.1 GENERALIZED ANXIETY DISORDER: ICD-10-CM

## 2018-02-05 DIAGNOSIS — Z72.0 TOBACCO USE: ICD-10-CM

## 2018-02-05 DIAGNOSIS — M79.2 NEUROPATHIC PAIN: ICD-10-CM

## 2018-02-05 DIAGNOSIS — R20.0 NUMBNESS IN BOTH HANDS: ICD-10-CM

## 2018-02-05 DIAGNOSIS — J44.9 CHRONIC OBSTRUCTIVE PULMONARY DISEASE, UNSPECIFIED COPD TYPE (HCC): Primary | ICD-10-CM

## 2018-02-05 DIAGNOSIS — Z23 ENCOUNTER FOR IMMUNIZATION: ICD-10-CM

## 2018-02-05 DIAGNOSIS — E03.9 HYPOTHYROIDISM, UNSPECIFIED TYPE: ICD-10-CM

## 2018-02-05 RX ORDER — GABAPENTIN 300 MG/1
300 CAPSULE ORAL 3 TIMES DAILY
COMMUNITY
End: 2018-09-17 | Stop reason: SDUPTHER

## 2018-02-05 RX ORDER — DULOXETIN HYDROCHLORIDE 30 MG/1
30 CAPSULE, DELAYED RELEASE ORAL DAILY
Qty: 30 CAP | Refills: 2 | Status: SHIPPED | OUTPATIENT
Start: 2018-02-05 | End: 2018-02-16 | Stop reason: DRUGHIGH

## 2018-02-05 NOTE — MR AVS SNAPSHOT
Gian Queen 
 
 
 [address] [place] [address] [phone] Patient: Tram King MRN: [id number] Visit Information Date & Time Provider Department Dept. Phone Encounter #  
 2/5/2018  2:00 PM Jemma Murray MD [facility] [phone] [id number] Follow-up Instructions Return in about 6 weeks (around 3/19/2018) for pain, mood. Upcoming Health Maintenance Date Due  
 PAP AKA CERVICAL CYTOLOGY 5/18/2018 DTaP/Tdap/Td series (2 - Td) 8/1/2018 Allergies as of 2/5/2018  Review Complete On: 2/5/2018 By: Jemma Murray MD  
  
 Severity Noted Reaction Type Reactions Hydrocodone  03/10/2010    Hives Current Immunizations  Reviewed on 2/6/2017 Name Date H1N1 FLU VACCINE 3/10/2010 Influenza Vaccine 9/1/2014, 8/21/2013 Influenza Vaccine (Quad) PF 2/6/2017 TDAP Vaccine 8/1/2008 ZZZ-RETIRED (DO NOT USE) Pneumococcal Vaccine (Unspecified Type) 3/10/2010 Not reviewed this visit You Were Diagnosed With   
  
 Codes Comments Chronic obstructive pulmonary disease, unspecified COPD type ([facility]    -  Primary ICD-10-CM: J44.9 ICD-9-CM: 932 Hypothyroidism, unspecified type     ICD-10-CM: E03.9 ICD-9-CM: 244.9 Generalized anxiety disorder     ICD-10-CM: F41.1 ICD-9-CM: 300.02 Tobacco use     ICD-10-CM: Z72.0 ICD-9-CM: 305.1 Numbness in both hands     ICD-10-CM: R20.0 ICD-9-CM: 782.0 Spinal stenosis in cervical region     ICD-10-CM: M48.02 
ICD-9-CM: 723.0 Neuropathic pain     ICD-10-CM: M79.2 ICD-9-CM: 729.2 Vitals BP Pulse Temp Resp Height(growth percentile) Weight(growth percentile) 121/75 (BP 1 Location: Right arm, BP Patient Position: Sitting) 75 98.1 °F (36.7 °C) (Oral) 14 5' 4\" (1.626 m) 147 lb (66.7 kg) LMP SpO2 BMI OB Status Smoking Status  01/05/2018 (Approximate) 97% 25.23 kg/m2 Having regular periods Current Every Day Smoker Vitals History BMI and BSA Data Body Mass Index Body Surface Area  
 25.23 kg/m 2 1.74 m 2 Preferred Pharmacy Pharmacy Name Phone [id number] [place] Jannie [phone] Your Updated Medication List  
  
   
This list is accurate as of: 2/5/18  2:55 PM.  Always use your most recent med list.  
  
  
  
  
 * albuterol 2.5 mg /3 mL (0.083 %) nebulizer solution Commonly known as:  PROVENTIL VENTOLIN  
by Nebulization route as needed for Wheezing. * albuterol 90 mcg/actuation inhaler Commonly known as:  PROVENTIL HFA, VENTOLIN HFA, PROAIR HFA Take 1 Puff by inhalation every four (4) hours as needed for Wheezing. albuterol-ipratropium 2.5 mg-0.5 mg/3 ml Nebu Commonly known as:  DUO-NEB  
3 mL by Nebulization route every four (4) hours as needed. CLARITIN 10 mg tablet Generic drug:  loratadine Take 10 mg by mouth as needed. DULoxetine 30 mg capsule Commonly known as:  CYMBALTA Take 1 Cap by mouth daily. gabapentin 300 mg capsule Commonly known as:  NEURONTIN Take 300 mg by mouth three (3) times daily. levothyroxine 200 mcg tablet Commonly known as:  SYNTHROID  
TAKE 1 TAB BY MOUTH DAILY (BEFORE BREAKFAST). TAKE WITH 50MCG DAILY FOR TOTAL 250MCG DAILY MOTRIN  mg tablet Generic drug:  ibuprofen Take 200 mg by mouth as needed. TYLENOL 325 mg tablet Generic drug:  acetaminophen Take  by mouth every four (4) hours as needed for Pain. * Notice: This list has 2 medication(s) that are the same as other medications prescribed for you. Read the directions carefully, and ask your doctor or other care provider to review them with you. Prescriptions Sent to Pharmacy Refills DULoxetine (CYMBALTA) 30 mg capsule 2 Sig: Take 1 Cap by mouth daily.   
 Class: Normal  
 Pharmacy: [facility]/pharmacy #[id number] Bette Gamble, Sujatha Jimenez  #: [phone] Route: Oral  
  
We Performed the Following METABOLIC PANEL, COMPREHENSIVE [25261 CPT(R)] REFERRAL TO NEUROSURGERY [WKI19 Custom] T4, FREE [id number] CPT(R)] TSH 3RD GENERATION [30236 CPT(R)] Follow-up Instructions Return in about 6 weeks (around 3/19/2018) for pain, mood. Referral Information Referral ID Referred By Referred To  
  
 [id number] [place], [facility] Department of Neurosurgery [id number] Nhan Mckeon Visits Status Start Date End Date 1 New Request 2/5/18 2/5/19 If your referral has a status of pending review or denied, additional information will be sent to support the outcome of this decision. Introducing [facility] & HEALTH SERVICES! Dear Georgia Hwang: Thank you for requesting a Populr account. Our records indicate that you have previously registered for a Populr account but its currently inactive. Please call our Populr support line at [phone]. Additional Information If you have questions, please visit the Frequently Asked Questions section of the Populr website at [email or web address] Remember, Populr is NOT to be used for urgent needs. For medical emergencies, dial 911. Now available from your iPhone and Android! Please provide this summary of care documentation to your next provider. Your primary care clinician is listed as Lloyd Freeman. If you have any questions after today's visit, please call [phone].

## 2018-02-05 NOTE — PATIENT INSTRUCTIONS
Vaccine Information Statement    Influenza (Flu) Vaccine (Inactivated or Recombinant): What you need to know    Many Vaccine Information Statements are available in Azeri and other languages. See www.immunize.org/vis  Hojas de Información Sobre Vacunas están disponibles en Español y en muchos otros idiomas. Visite www.immunize.org/vis    1. Why get vaccinated? Influenza (flu) is a contagious disease that spreads around the [place] every year, usually between October and May. Flu is caused by influenza viruses, and is spread mainly by coughing, sneezing, and close contact. Anyone can get flu. Flu strikes suddenly and can last several days. Symptoms vary by age, but can include:   fever/chills   sore throat   muscle aches   fatigue   cough   headache    runny or stuffy nose    Flu can also lead to pneumonia and blood infections, and cause diarrhea and seizures in children. If you have a medical condition, such as heart or lung disease, flu can make it worse. Flu is more dangerous for some people. Infants and young children, people 72years of age and older, pregnant women, and people with certain health conditions or a weakened immune system are at greatest risk. Each year thousands of people in the [facility] die from flu, and many more are hospitalized. Flu vaccine can:   keep you from getting flu,   make flu less severe if you do get it, and   keep you from spreading flu to your family and other people. 2. Inactivated and recombinant flu vaccines    A dose of flu vaccine is recommended every flu season. Children 6 months through 6years of age may need two doses during the same flu season. Everyone else needs only one dose each flu season.        Some inactivated flu vaccines contain a very small amount of a mercury-based preservative called thimerosal. Studies have not shown thimerosal in vaccines to be harmful, but flu vaccines that do not contain thimerosal are available. There is no live flu virus in flu shots. They cannot cause the flu. There are many flu viruses, and they are always changing. Each year a new flu vaccine is made to protect against three or four viruses that are likely to cause disease in the upcoming flu season. But even when the vaccine doesnt exactly match these viruses, it may still provide some protection    Flu vaccine cannot prevent:   flu that is caused by a virus not covered by the vaccine, or   illnesses that look like flu but are not. It takes about 2 weeks for protection to develop after vaccination, and protection lasts through the flu season. 3. Some people should not get this vaccine    Tell the person who is giving you the vaccine:     If you have any severe, life-threatening allergies. If you ever had a life-threatening allergic reaction after a dose of flu vaccine, or have a severe allergy to any part of this vaccine, you may be advised not to get vaccinated. Most, but not all, types of flu vaccine contain a small amount of egg protein.  If you ever had Guillain-Barré Syndrome (also called GBS). Some people with a history of GBS should not get this vaccine. This should be discussed with your doctor.  If you are not feeling well. It is usually okay to get flu vaccine when you have a mild illness, but you might be asked to come back when you feel better. 4. Risks of a vaccine reaction    With any medicine, including vaccines, there is a chance of reactions. These are usually mild and go away on their own, but serious reactions are also possible. Most people who get a flu shot do not have any problems with it.      Minor problems following a flu shot include:    soreness, redness, or swelling where the shot was given     hoarseness   sore, red or itchy eyes   cough   fever   aches   headache   itching   fatigue  If these problems occur, they usually begin soon after the shot and last 1 or 2 days. More serious problems following a flu shot can include the following:     There may be a small increased risk of Guillain-Barré Syndrome (GBS) after inactivated flu vaccine. This risk has been estimated at 1 or 2 additional cases per million people vaccinated. This is much lower than the risk of severe complications from flu, which can be prevented by flu vaccine.  Young children who get the flu shot along with pneumococcal vaccine (PCV13) and/or DTaP vaccine at the same time might be slightly more likely to have a seizure caused by fever. Ask your doctor for more information. Tell your doctor if a child who is getting flu vaccine has ever had a seizure. Problems that could happen after any injected vaccine:      People sometimes faint after a medical procedure, including vaccination. Sitting or lying down for about 15 minutes can help prevent fainting, and injuries caused by a fall. Tell your doctor if you feel dizzy, or have vision changes or ringing in the ears.  Some people get severe pain in the shoulder and have difficulty moving the arm where a shot was given. This happens very rarely.  Any medication can cause a severe allergic reaction. Such reactions from a vaccine are very rare, estimated at about 1 in a million doses, and would happen within a few minutes to a few hours after the vaccination. As with any medicine, there is a very remote chance of a vaccine causing a serious injury or death. The safety of vaccines is always being monitored. For more information, visit: www.cdc.gov/vaccinesafety/    5. What if there is a serious reaction? What should I look for?  Look for anything that concerns you, such as signs of a severe allergic reaction, very high fever, or unusual behavior.     Signs of a severe allergic reaction can include hives, swelling of the face and throat, difficulty breathing, a fast heartbeat, dizziness, and weakness  usually within a few minutes to a few hours after the vaccination. What should I do?  If you think it is a severe allergic reaction or other emergency that cant wait, call 9-1-1 and get the person to the nearest hospital. Otherwise, call your doctor.  Reactions should be reported to the Vaccine Adverse Event Reporting System (VAERS). Your doctor should file this report, or you can do it yourself through  the VAERS web site at www.vaers. [facility].gov, or by calling 8-902.375.8005. VAERS does not give medical advice. 6. The National Vaccine Injury Compensation Program    The [facility] Vaccine Injury Compensation Program (VICP) is a federal program that was created to compensate people who may have been injured by certain vaccines. Persons who believe they may have been injured by a vaccine can learn about the program and about filing a claim by calling 8-710.436.1990 or visiting the BiiCode website at www.[facility].gov/vaccinecompensation. There is a time limit to file a claim for compensation. 7. How can I learn more?  Ask your healthcare provider. He or she can give you the vaccine package insert or suggest other sources of information.  Call your local or state health department.  Contact the Centers for Disease Control and Prevention (CDC):  - Call 8-717.701.8169 (1-800-CDC-INFO) or  - Visit CDCs website at www.cdc.gov/flu    Vaccine Information Statement   Inactivated Influenza Vaccine   8/7/2015  42 PAT [place]is 457II-04    Department of Health and Human Services  Centers for Disease Control and Prevention    Office Use Only

## 2018-02-05 NOTE — PROGRESS NOTES
Juliet Sanches  Identified pt with two pt identifiers(name and ). Chief Complaint   Patient presents with    Thyroid Problem     Room 3// NON fasting        1. Have you been to the ER, urgent care clinic since your last visit? Hospitalized since your last visit? NO    2. Have you seen or consulted any other health care providers outside of the [address] [place] [address] since your last visit? Include any pap smears or colon screening. NO      Dr Billie Nascimento notified of reason for visit, vitals and flowsheets obtained on patients.      Patient received paperwork for advance directive during previous visit but has not completed at this time     Reviewed record In preparation for visit, huddled with provider and have obtained necessary documentation      Health Maintenance Due   Topic    PAP AKA CERVICAL CYTOLOGY        Wt Readings from Last 3 Encounters:   18 147 lb (66.7 kg)   17 135 lb (61.2 kg)   17 132 lb 6.4 oz (60.1 kg)     Temp Readings from Last 3 Encounters:   17 98.2 °F (36.8 °C) (Oral)   17 98.7 °F (37.1 °C) (Oral)   17 98.2 °F (36.8 °C)     BP Readings from Last 3 Encounters:   17 117/71   17 123/78   17 123/75     Pulse Readings from Last 3 Encounters:   17 (!) 103   17 96   17 90     Vitals:    18 1402   Weight: 147 lb (66.7 kg)   Height: 5' 4\" (1.626 m)   PainSc:   0 - No pain   LMP: 2018         Learning Assessment:  :     Learning Assessment 2014   PRIMARY LEARNER Patient   HIGHEST LEVEL OF EDUCATION - PRIMARY LEARNER  DID NOT GRADUATE HIGH SCHOOL   BARRIERS PRIMARY LEARNER NONE   PRIMARY LANGUAGE ENGLISH    NEED No   LEARNER PREFERENCE PRIMARY DEMONSTRATION   ANSWERED BY patient   RELATIONSHIP SELF       Depression Screening:  :     PHQ over the last two weeks 2017   Little interest or pleasure in doing things Not at all   Feeling down, depressed or hopeless Not at all   Total Score PHQ 2 0 Fall Risk Assessment:  :     No flowsheet data found. Abuse Screening:  :     No flowsheet data found. ADL Screening:  :     ADL Assessment 3/17/2015   Feeding yourself No Help Needed   Getting from bed to chair No Help Needed   Getting dressed No Help Needed   Bathing or showering No Help Needed   Walk across the room (includes cane/walker) No Help Needed   Using the telphone No Help Needed   Taking your medications No Help Needed   Preparing meals No Help Needed   Managing money (expenses/bills) No Help Needed   Moderately strenuous housework (laundry) No Help Needed   Shopping for personal items (toiletries/medicines) No Help Needed   Shopping for groceries No Help Needed   Driving No Help Needed   Climbing a flight of stairs No Help Needed   Getting to places beyond walking distances No Help Needed               I have received verbal consent from Libia Joyce to discuss any/all medical information while they are present in the room. Medication reconciliation up to date and corrected with patient at this time.

## 2018-02-05 NOTE — PROGRESS NOTES
HPI  Ms. Shorty Scott is a 50y.o. year old female, she is seen today for follow up hypothyroidism. Now taking levothyroxine at night for the past 6 mos - normally misses about a day per week. Is remembering to take her medication more often. Pain got worse in left arm after surgery. Also complains of neck pain - no better after surgery. Declines further referral at this time. Fingers 3-5 left hand are weak. In May 2017 had the following surgery with Dr. Barbie Gomez:  1. C3 through C6 anterior cervical diskectomy and fusion x3.   2. C3, C4, C5, C6 anterior instrumentation. 3. Insertion of interbody biomechanical device C3-C6 x3. 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 Cannot afford to lose time from work. Energy is low, feeling tired. Taking gabapentin tid for arm pain and neuropathic pain. Has had more anxiety, also more down and depressed. Didn't feel this way until gabapentin. No SI. Feels like she's in a hole, can't get out. More apathetic. Seen over the summer for COPD exacerbation. Now breathing is back to normal - normally has sob worse in winter. Occasional cough. Chief Complaint   Patient presents with    Thyroid Problem     Room 3// NON fasting         Prior to Admission medications    Medication Sig Start Date End Date Taking? Authorizing Provider   gabapentin (NEURONTIN) 300 mg capsule Take 300 mg by mouth three (3) times daily. Yes Historical Provider   DULoxetine (CYMBALTA) 30 mg capsule Take 1 Cap by mouth daily. 2/5/18  Yes Beckie Boxer, MD   levothyroxine (SYNTHROID) 200 mcg tablet TAKE 1 TAB BY MOUTH DAILY (BEFORE BREAKFAST). TAKE WITH 50MCG DAILY FOR TOTAL 250MCG DAILY 11/27/17  Yes Beckie Boxer, MD   albuterol-ipratropium (DUO-NEB) 2.5 mg-0.5 mg/3 ml nebu 3 mL by Nebulization route every four (4) hours as needed.  6/14/17  Yes Lady Donis MD   albuterol (PROVENTIL VENTOLIN) 2.5 mg /3 mL (0.083 %) nebulizer solution by Nebulization route as needed for Wheezing. Yes Historical Provider   albuterol (PROVENTIL HFA, VENTOLIN HFA, PROAIR HFA) 90 mcg/actuation inhaler Take 1 Puff by inhalation every four (4) hours as needed for Wheezing. 2/6/17  Yes Teresa Alejandra MD   ibuprofen (MOTRIN IB) 200 mg tablet Take 200 mg by mouth as needed. Yes Historical Provider   acetaminophen (TYLENOL) 325 mg tablet Take  by mouth every four (4) hours as needed for Pain. Yes Historical Provider   loratadine (CLARITIN) 10 mg tablet Take 10 mg by mouth as needed. Yes Historical Provider         Allergies   Allergen Reactions    Hydrocodone Hives         REVIEW OF SYSTEMS:  Per HPI    PHYSICAL EXAM:  Visit Vitals    /75 (BP 1 Location: Right arm, BP Patient Position: Sitting)    Pulse 75    Temp 98.1 °F (36.7 °C) (Oral)    Resp 14    Ht 5' 4\" (1.626 m)    Wt 147 lb (66.7 kg)    LMP 01/05/2018 (Approximate)    SpO2 97%    BMI 25.23 kg/m2     Constitutional: Appears well-developed and well-nourished. No distress. HENT:   Head: Normocephalic and atraumatic. Eyes: No scleral icterus. Neck: no lad, no tm  Cardiovascular: Normal S1/S2, regular rhythm. No murmurs, rubs, or gallops. Pulmonary/Chest: Effort normal and breath sounds normal. No respiratory distress. No wheezes, rhonchi, or rales. Ext: No edema. Neurological: Alert.  3/5 left hand otherwise 5/5 b/l UE. Psychiatric: Normal mood and affect. Behavior is normal. Good eye contact.       Lab Results   Component Value Date/Time    Sodium 139 05/02/2017 11:30 AM    Potassium 3.9 05/02/2017 11:30 AM    Chloride 104 05/02/2017 11:30 AM    CO2 26 05/02/2017 11:30 AM    Anion gap 9 05/02/2017 11:30 AM    Glucose 74 05/02/2017 11:30 AM    BUN 7 05/02/2017 11:30 AM    Creatinine 0.61 05/02/2017 11:30 AM    BUN/Creatinine ratio 11 05/02/2017 11:30 AM    GFR est AA >60 05/02/2017 11:30 AM    GFR est non-AA >60 05/02/2017 11:30 AM    Calcium 8.3 05/02/2017 11:30 AM    Bilirubin, total 0.5 05/02/2017 11:30 AM    AST (SGOT) 13 05/02/2017 11:30 AM    Alk. phosphatase 66 05/02/2017 11:30 AM    Protein, total 6.9 05/02/2017 11:30 AM    Albumin 3.6 05/02/2017 11:30 AM    Globulin 3.3 05/02/2017 11:30 AM    A-G Ratio 1.1 05/02/2017 11:30 AM    ALT (SGPT) 16 05/02/2017 11:30 AM     Lab Results   Component Value Date/Time    Hemoglobin A1c 5.5 05/02/2017 11:30 AM      No results found for: CHOL, CHOLPOCT, CHOLX, CHLST, CHOLV, HDL, HDLPOC, LDL, LDLCPOC, LDLC, DLDLP, VLDLC, VLDL, TGLX, TRIGL, TRIGP, TGLPOCT, CHHD, CHHDX       ASSESSMENT/PLAN  Diagnoses and all orders for this visit:    1. Chronic obstructive pulmonary disease, unspecified COPD type ([facility] [address]  Stable. continue current medications , encouraged smoking cessation   2. Hypothyroidism, unspecified type  -     TSH 3RD GENERATION  -     T4, FREE  -     METABOLIC PANEL, COMPREHENSIVE  Encouraged compliance every night - takes gabapentin every night and can take levothyroxine with it  3. Generalized anxiety disorder  -     DULoxetine (CYMBALTA) 30 mg capsule; Take 1 Cap by mouth daily. 4. Tobacco use    5. Numbness in both hands  -     REFERRAL TO NEUROSURGERY  With weakness - refer to nsgy for another evaluation - stressed importance of being seen so as not to lose further function  6. Spinal stenosis in cervical region  -     REFERRAL TO NEUROSURGERY    7. Neuropathic pain  -     DULoxetine (CYMBALTA) 30 mg capsule; Take 1 Cap by mouth daily.  -     REFERRAL TO NEUROSURGERY  With worsening mood - stop paxil - start above      Health Maintenance Due   Topic Date Due    PAP AKA CERVICAL CYTOLOGY  05/18/2018        Follow-up Disposition:  Return in about 6 weeks (around 3/19/2018) for pain, mood. Reviewed plan of care. Patient has provided input and agrees with goals. The nurse provided the patient and/or family with advanced directive information if needed and encouraged the patient to provide a copy to the office when available.

## 2018-02-06 LAB
ALBUMIN SERPL-MCNC: 4.3 G/DL (ref 3.5–5.5)
ALBUMIN/GLOB SERPL: 2.2 {RATIO} (ref 1.2–2.2)
ALP SERPL-CCNC: 66 IU/L (ref 39–117)
ALT SERPL-CCNC: 10 IU/L (ref 0–32)
AST SERPL-CCNC: 17 IU/L (ref 0–40)
BILIRUB SERPL-MCNC: <0.2 MG/DL (ref 0–1.2)
BUN SERPL-MCNC: 10 MG/DL (ref 6–24)
BUN/CREAT SERPL: 18 (ref 9–23)
CALCIUM SERPL-MCNC: 8.8 MG/DL (ref 8.7–10.2)
CHLORIDE SERPL-SCNC: 100 MMOL/L (ref 96–106)
CO2 SERPL-SCNC: 30 MMOL/L (ref 18–29)
CREAT SERPL-MCNC: 0.56 MG/DL (ref 0.57–1)
GFR SERPLBLD CREATININE-BSD FMLA CKD-EPI: 111 ML/MIN/1.73
GFR SERPLBLD CREATININE-BSD FMLA CKD-EPI: 128 ML/MIN/1.73
GLOBULIN SER CALC-MCNC: 2 G/DL (ref 1.5–4.5)
GLUCOSE SERPL-MCNC: 76 MG/DL (ref 65–99)
POTASSIUM SERPL-SCNC: 4.2 MMOL/L (ref 3.5–5.2)
PROT SERPL-MCNC: 6.3 G/DL (ref 6–8.5)
SODIUM SERPL-SCNC: 143 MMOL/L (ref 134–144)
T4 FREE SERPL-MCNC: 1.14 NG/DL (ref 0.82–1.77)
TSH SERPL DL<=0.005 MIU/L-ACNC: 6.49 UIU/ML (ref 0.45–4.5)

## 2018-02-16 RX ORDER — DULOXETIN HYDROCHLORIDE 60 MG/1
60 CAPSULE, DELAYED RELEASE ORAL DAILY
Qty: 30 CAP | Refills: 2 | Status: SHIPPED | OUTPATIENT
Start: 2018-02-16 | End: 2018-05-21 | Stop reason: SDUPTHER

## 2018-02-16 NOTE — TELEPHONE ENCOUNTER
Pt called and stated that the Cymbalta she is taking is not doing anything for her. She wants to know can her dosage be increased? Pt would like a call back to discuss at [phone].

## 2018-03-19 ENCOUNTER — OFFICE VISIT (OUTPATIENT)
Dept: INTERNAL MEDICINE CLINIC | Age: 48
End: 2018-03-19

## 2018-03-19 VITALS
WEIGHT: 146.4 LBS | TEMPERATURE: 97.9 F | RESPIRATION RATE: 14 BRPM | HEART RATE: 76 BPM | SYSTOLIC BLOOD PRESSURE: 123 MMHG | DIASTOLIC BLOOD PRESSURE: 80 MMHG | OXYGEN SATURATION: 96 % | BODY MASS INDEX: 25 KG/M2 | HEIGHT: 64 IN

## 2018-03-19 DIAGNOSIS — F41.1 GENERALIZED ANXIETY DISORDER: ICD-10-CM

## 2018-03-19 DIAGNOSIS — M79.2 NEUROPATHIC PAIN: ICD-10-CM

## 2018-03-19 DIAGNOSIS — Z72.0 TOBACCO USE: ICD-10-CM

## 2018-03-19 DIAGNOSIS — E03.9 HYPOTHYROIDISM, UNSPECIFIED TYPE: ICD-10-CM

## 2018-03-19 DIAGNOSIS — M48.02 SPINAL STENOSIS IN CERVICAL REGION: Primary | ICD-10-CM

## 2018-03-19 RX ORDER — DULOXETIN HYDROCHLORIDE 30 MG/1
30 CAPSULE, DELAYED RELEASE ORAL DAILY
Qty: 30 CAP | Refills: 3 | Status: SHIPPED | OUTPATIENT
Start: 2018-03-19 | End: 2018-06-26 | Stop reason: DRUGHIGH

## 2018-03-19 NOTE — MR AVS SNAPSHOT
[address] [place] [address] [address] [address] [phone] Patient: Nakul Pro MRN: [id number] [id number] Visit Information Date & Time Provider Department Dept. Phone Encounter #  
 3/19/2018 10:30 AM Kadi Alston MD Lisa Tran [phone] [id number] Follow-up Instructions Return in about 3 months (around 6/19/2018) for pain, mood, thyroid. Upcoming Health Maintenance Date Due  
 PAP AKA CERVICAL CYTOLOGY 5/18/2018 DTaP/Tdap/Td series (2 - Td) 8/1/2018 Allergies as of 3/19/2018  Review Complete On: 3/19/2018 By: Kadi Alston MD  
  
 Severity Noted Reaction Type Reactions Hydrocodone  03/10/2010    Hives Current Immunizations  Reviewed on 2/6/2017 Name Date H1N1 FLU VACCINE 3/10/2010 Influenza Vaccine 9/1/2014, 8/21/2013 Influenza Vaccine (Quad) PF 2/5/2018, 2/6/2017 TDAP Vaccine 8/1/2008 ZZZ-RETIRED (DO NOT USE) Pneumococcal Vaccine (Unspecified Type) 3/10/2010 Not reviewed this visit You Were Diagnosed With   
  
 Codes Comments Spinal stenosis in cervical region    -  Primary ICD-10-CM: M48.02 
ICD-9-CM: 723.0 Neuropathic pain     ICD-10-CM: M79.2 ICD-9-CM: 729.2 Hypothyroidism, unspecified type     ICD-10-CM: E03.9 ICD-9-CM: 244.9 Generalized anxiety disorder     ICD-10-CM: F41.1 ICD-9-CM: 300.02 Tobacco use     ICD-10-CM: Z72.0 ICD-9-CM: 305.1 Vitals BP Pulse Temp Resp Height(growth percentile) Weight(growth percentile) 123/80 (BP 1 Location: Right arm, BP Patient Position: Sitting) 76 97.9 °F (36.6 °C) (Oral) 14 5' 4\" (1.626 m) 146 lb 6.4 oz (66.4 kg) LMP SpO2 BMI OB Status Smoking Status 01/31/2018 (Approximate) 96% 25.13 kg/m2 Having regular periods Current Every Day Smoker Vitals History BMI and BSA Data  Body Mass Index Body Surface Area  
 25.13 kg/m 2 1.73 m 2  
  
  
 Preferred Pharmacy Pharmacy Name Phone [facility]/PHARMACY [facility][id number] [place] [address] [phone] Your Updated Medication List  
  
   
This list is accurate as of 3/19/18 10:41 AM.  Always use your most recent med list.  
  
  
  
  
 * albuterol 2.5 mg /3 mL (0.083 %) nebulizer solution Commonly known as:  PROVENTIL VENTOLIN  
by Nebulization route as needed for Wheezing. * albuterol 90 mcg/actuation inhaler Commonly known as:  PROVENTIL HFA, VENTOLIN HFA, PROAIR HFA Take 1 Puff by inhalation every four (4) hours as needed for Wheezing. albuterol-ipratropium 2.5 mg-0.5 mg/3 ml Nebu Commonly known as:  DUO-NEB  
3 mL by Nebulization route every four (4) hours as needed. CLARITIN 10 mg tablet Generic drug:  loratadine Take 10 mg by mouth as needed. * DULoxetine 60 mg capsule Commonly known as:  CYMBALTA Take 1 Cap by mouth daily. * DULoxetine 30 mg capsule Commonly known as:  CYMBALTA Take 1 Cap by mouth daily. In morning and 60mg evening  
  
 gabapentin 300 mg capsule Commonly known as:  NEURONTIN Take 300 mg by mouth three (3) times daily. levothyroxine 200 mcg tablet Commonly known as:  SYNTHROID Take 1 Tab by mouth Daily (before breakfast). MOTRIN  mg tablet Generic drug:  ibuprofen Take 200 mg by mouth as needed. TYLENOL 325 mg tablet Generic drug:  acetaminophen Take  by mouth every four (4) hours as needed for Pain. * Notice: This list has 4 medication(s) that are the same as other medications prescribed for you. Read the directions carefully, and ask your doctor or other care provider to review them with you. Prescriptions Sent to Pharmacy Refills DULoxetine (CYMBALTA) 30 mg capsule 3 Sig: Take 1 Cap by mouth daily. In morning and 60mg evening  Class: Normal  
 Pharmacy: [facility]/pharmacy #[id number] [address] [facility] [address] [place]  #: [phone] Route: Oral  
  
Follow-up Instructions Return in about 3 months (around 6/19/2018) for pain, mood, thyroid. Introducing [address]! Dear [employer]ond [employer]: Thank you for requesting a [employer] account. Our records indicate that you have previously registered for a [employer] account but its currently inactive. Please call our [employer] support line at [phone]. Additional Information If you have questions, please visit the Frequently Asked Questions section of the [employer] website at [email or web address] Remember, [employer] is NOT to be used for urgent needs. For medical emergencies, dial 911. Now available from your iPhone and Android! Please provide this summary of care documentation to your next provider. Your primary care clinician is listed as Lloyd Freeman. If you have any questions after today's visit, please call [phone].

## 2018-03-19 NOTE — PROGRESS NOTES
HPI  Ms. Meghan Christian is a 50y.o. year old female, she is seen today for follow up pain and depression. Relevant plan last visit:    Generalized anxiety disorder  -     DULoxetine (CYMBALTA) 30 mg capsule; Take 1 Cap by mouth daily. Numbness in both hands  -     REFERRAL TO NEUROSURGERY  With weakness - refer to nsgy for another evaluation - stressed importance of being seen so as not to lose further function  Spinal stenosis in cervical region and neuropathic pain:  -     REFERRAL TO NEUROSURGERY + start cymbalta and stop paxil     Says she is much better on cymbalta 60mg daily. Says anxiety is much better than when on paxil. Notes less symptoms of feeling down, depressed. Sleeps with new pillow which supports her neck but notes right arm is \"falling asleep\" more and fingertips are sore and sensitive worse in evening. Feels the cymbalta may be wearing off by then as she takes it hs. Left hand is weaker chronically, not worse. Is now taking levothyroxine regularly and notes fatigue is improved. Chief Complaint   Patient presents with    Pain (Chronic)     Room 2// NON fasting // currently on Amox per pt r/t tooth infection     Medication Evaluation        Prior to Admission medications    Medication Sig Start Date End Date Taking? Authorizing Provider   DULoxetine (CYMBALTA) 60 mg capsule Take 1 Cap by mouth daily. 2/16/18  Yes Ketan Alves MD   levothyroxine (SYNTHROID) 200 mcg tablet Take 1 Tab by mouth Daily (before breakfast). 2/8/18  Yes Ketan Alves MD   gabapentin (NEURONTIN) 300 mg capsule Take 300 mg by mouth three (3) times daily. Yes Historical Provider   albuterol-ipratropium (DUO-NEB) 2.5 mg-0.5 mg/3 ml nebu 3 mL by Nebulization route every four (4) hours as needed. 6/14/17  Yes Guillermina Cid MD   albuterol (PROVENTIL VENTOLIN) 2.5 mg /3 mL (0.083 %) nebulizer solution by Nebulization route as needed for Wheezing.    Yes Historical Provider   albuterol (PROVENTIL HFA, VENTOLIN HFA, PROAIR HFA) 90 mcg/actuation inhaler Take 1 Puff by inhalation every four (4) hours as needed for Wheezing. 2/6/17  Yes Luca Sánchez MD   ibuprofen (MOTRIN IB) 200 mg tablet Take 200 mg by mouth as needed. Yes Historical Provider   acetaminophen (TYLENOL) 325 mg tablet Take  by mouth every four (4) hours as needed for Pain. Yes Historical Provider   loratadine (CLARITIN) 10 mg tablet Take 10 mg by mouth as needed. Yes Historical Provider         Allergies   Allergen Reactions    Hydrocodone Hives         REVIEW OF SYSTEMS:  Per HPI    PHYSICAL EXAM:  Visit Vitals    /80 (BP 1 Location: Right arm, BP Patient Position: Sitting)    Pulse 76    Temp 97.9 °F (36.6 °C) (Oral)    Resp 14    Ht 5' 4\" (1.626 m)    Wt 146 lb 6.4 oz (66.4 kg)    LMP 01/31/2018 (Approximate)    SpO2 96%    BMI 25.13 kg/m2     Constitutional: Appears well-developed and well-nourished. No distress. HENT:   Head: Normocephalic and atraumatic. Eyes: No scleral icterus. Cardiovascular: Normal S1/S2, regular rhythm. No murmurs, rubs, or gallops. Pulmonary/Chest: Effort normal and breath sounds normal. No respiratory distress. No wheezes, rhonchi, or rales. Ext: No edema. Neurological: Alert. Hyperalgesia fingers 3-5 left hand, decreased sensation fingers 3-5 right hand, strength 5/5 b/l UE  Psychiatric: Normal mood and affect.  Behavior is normal.     Lab Results   Component Value Date/Time    Sodium 143 02/05/2018 03:09 PM    Potassium 4.2 02/05/2018 03:09 PM    Chloride 100 02/05/2018 03:09 PM    CO2 30 (H) 02/05/2018 03:09 PM    Anion gap 9 05/02/2017 11:30 AM    Glucose 76 02/05/2018 03:09 PM    BUN 10 02/05/2018 03:09 PM    Creatinine 0.56 (L) 02/05/2018 03:09 PM    BUN/Creatinine ratio 18 02/05/2018 03:09 PM    GFR est  02/05/2018 03:09 PM    GFR est non- 02/05/2018 03:09 PM    Calcium 8.8 02/05/2018 03:09 PM    Bilirubin, total <0.2 02/05/2018 03:09 PM    AST (SGOT) 17 02/05/2018 03:09 PM    Alk. phosphatase 66 02/05/2018 03:09 PM    Protein, total 6.3 02/05/2018 03:09 PM    Albumin 4.3 02/05/2018 03:09 PM    Globulin 3.3 05/02/2017 11:30 AM    A-G Ratio 2.2 02/05/2018 03:09 PM    ALT (SGPT) 10 02/05/2018 03:09 PM     Lab Results   Component Value Date/Time    Hemoglobin A1c 5.5 05/02/2017 11:30 AM      No results found for: CHOL, CHOLPOCT, CHOLX, CHLST, CHOLV, HDL, HDLPOC, LDL, LDLCPOC, LDLC, DLDLP, VLDLC, VLDL, TGLX, TRIGL, TRIGP, TGLPOCT, CHHD, CHHDX       ASSESSMENT/PLAN  Diagnoses and all orders for this visit:    1. Spinal stenosis in cervical region    2. Neuropathic pain  -     DULoxetine (CYMBALTA) 30 mg capsule; Take 1 Cap by mouth daily. In morning and 60mg evening  Improved as well as strength improved - increase dose of cymbalta  3. Hypothyroidism, unspecified type  tsh next visit - now compliant with levothyroxine  4. Generalized anxiety disorder  -     DULoxetine (CYMBALTA) 30 mg capsule; Take 1 Cap by mouth daily. In morning and 60mg evening    5. Tobacco use  encouraged smoking cessation         Health Maintenance Due   Topic Date Due    PAP AKA CERVICAL CYTOLOGY  05/18/2018        Follow-up Disposition:  Return in about 3 months (around 6/19/2018) for pain, mood, thyroid. Reviewed plan of care. Patient has provided input and agrees with goals. The nurse provided the patient and/or family with advanced directive information if needed and encouraged the patient to provide a copy to the office when available.

## 2018-05-21 RX ORDER — DULOXETIN HYDROCHLORIDE 60 MG/1
60 CAPSULE, DELAYED RELEASE ORAL DAILY
Qty: 30 CAP | Refills: 2 | Status: SHIPPED | OUTPATIENT
Start: 2018-05-21 | End: 2018-06-26 | Stop reason: DRUGHIGH

## 2018-05-21 NOTE — TELEPHONE ENCOUNTER
PCP: Alicia Osborn MD     Last appt: Visit date not found   Future Appointments  Date Time Provider Chuy You   7/2/2018 9:30 AM Alicia Osborn MD [facility]        Requested Prescriptions      No prescriptions requested or ordered in this encounter

## 2018-06-22 ENCOUNTER — TELEPHONE (OUTPATIENT)
Dept: INTERNAL MEDICINE CLINIC | Facility: CLINIC | Age: 48
End: 2018-06-22

## 2018-06-22 NOTE — TELEPHONE ENCOUNTER
Pt states Cymbalta working would like to know if Paxil can be called [facility] today for her to have for the weekend

## 2018-06-22 NOTE — TELEPHONE ENCOUNTER
Pt reports the Cymbalta is not working at all for her anxiety. Advise we will need to discuss this with  [employer].  [employer] will be back on Tues. \"The Cymbalta is like I am taking nothing\"  Would like to be back on Paxil. Advise pt I will have Dr [employer] review this on Tuesday however pt has 7/2/18 appt with  [employer].

## 2018-06-26 RX ORDER — PAROXETINE HYDROCHLORIDE 40 MG/1
40 TABLET, FILM COATED ORAL DAILY
Qty: 90 TAB | Refills: 1 | Status: SHIPPED | OUTPATIENT
Start: 2018-06-26 | End: 2019-03-30 | Stop reason: SDUPTHER

## 2018-06-26 NOTE — TELEPHONE ENCOUNTER
Can stop cymbalta and start paxil. Continue 30mg cymbalta for 3-5 days while stopping 60mg dose and starting paxil 40mg dose.

## 2018-07-02 ENCOUNTER — OFFICE VISIT (OUTPATIENT)
Dept: INTERNAL MEDICINE CLINIC | Facility: CLINIC | Age: 48
End: 2018-07-02

## 2018-07-02 VITALS
HEART RATE: 80 BPM | SYSTOLIC BLOOD PRESSURE: 106 MMHG | OXYGEN SATURATION: 96 % | TEMPERATURE: 98.5 F | BODY MASS INDEX: 24.69 KG/M2 | WEIGHT: 144.6 LBS | RESPIRATION RATE: 16 BRPM | HEIGHT: 64 IN | DIASTOLIC BLOOD PRESSURE: 69 MMHG

## 2018-07-02 DIAGNOSIS — Z12.4 CERVICAL CANCER SCREENING: ICD-10-CM

## 2018-07-02 DIAGNOSIS — M48.02 SPINAL STENOSIS IN CERVICAL REGION: Primary | ICD-10-CM

## 2018-07-02 DIAGNOSIS — M79.2 NEUROPATHIC PAIN: ICD-10-CM

## 2018-07-02 DIAGNOSIS — Z12.39 BREAST CANCER SCREENING: ICD-10-CM

## 2018-07-02 DIAGNOSIS — R53.83 FATIGUE, UNSPECIFIED TYPE: ICD-10-CM

## 2018-07-02 DIAGNOSIS — E03.9 HYPOTHYROIDISM, UNSPECIFIED TYPE: ICD-10-CM

## 2018-07-02 DIAGNOSIS — E55.9 VITAMIN D DEFICIENCY: ICD-10-CM

## 2018-07-02 DIAGNOSIS — R29.898 LEFT HAND WEAKNESS: ICD-10-CM

## 2018-07-02 DIAGNOSIS — Z72.0 TOBACCO USE: ICD-10-CM

## 2018-07-02 DIAGNOSIS — F41.1 GENERALIZED ANXIETY DISORDER: ICD-10-CM

## 2018-07-02 DIAGNOSIS — J44.9 CHRONIC OBSTRUCTIVE PULMONARY DISEASE, UNSPECIFIED COPD TYPE (HCC): ICD-10-CM

## 2018-07-02 RX ORDER — BUDESONIDE AND FORMOTEROL FUMARATE DIHYDRATE 160; 4.5 UG/1; UG/1
2 AEROSOL RESPIRATORY (INHALATION) 2 TIMES DAILY
Qty: 1 INHALER | Refills: 11 | Status: SHIPPED | OUTPATIENT
Start: 2018-07-02 | End: 2020-03-03 | Stop reason: SDUPTHER

## 2018-07-02 RX ORDER — BUPROPION HYDROCHLORIDE 150 MG/1
150 TABLET, EXTENDED RELEASE ORAL 2 TIMES DAILY
Qty: 60 TAB | Refills: 3 | Status: SHIPPED | OUTPATIENT
Start: 2018-07-02 | End: 2018-10-29

## 2018-07-02 NOTE — MR AVS SNAPSHOT
[address] [phone] Patient: Diamond Delgadillo MRN: [id number] YOV:[id number] Visit Information Date & Time Provider Department Dept. Phone Encounter #  
 7/2/2018  9:30 AM El Spence MD [facility] Medicine of [address] [place] [address] [id number] Follow-up Instructions Return in about 3 months (around 10/2/2018) for med eval. Upcoming Health Maintenance Date Due  
 PAP AKA CERVICAL CYTOLOGY 8/31/2018* DTaP/Tdap/Td series (2 - Td) 8/1/2018 Influenza Age 5 to Adult 8/1/2018 *Topic was postponed. The date shown is not the original due date. Allergies as of 7/2/2018  Review Complete On: 7/2/2018 By: El Spence MD  
  
 Severity Noted Reaction Type Reactions Hydrocodone  03/10/2010    Hives Current Immunizations  Reviewed on 2/6/2017 Name Date H1N1 FLU VACCINE 3/10/2010 Influenza Vaccine 9/1/2014, 8/21/2013 Influenza Vaccine (Quad) PF 2/5/2018, 2/6/2017 TDAP Vaccine 8/1/2008 ZZZ-RETIRED (DO NOT USE) Pneumococcal Vaccine (Unspecified Type) 3/10/2010 Not reviewed this visit You Were Diagnosed With   
  
 Codes Comments Spinal stenosis in cervical region    -  Primary ICD-10-CM: M48.02 
ICD-9-CM: 723.0 Neuropathic pain     ICD-10-CM: M79.2 ICD-9-CM: 729.2 Generalized anxiety disorder     ICD-10-CM: F41.1 ICD-9-CM: 300.02 Hypothyroidism, unspecified type     ICD-10-CM: E03.9 ICD-9-CM: 244.9 Cervical cancer screening     ICD-10-CM: Z12.4 ICD-9-CM: V76.2 Breast cancer screening     ICD-10-CM: Z12.31 
ICD-9-CM: V76.10 Chronic obstructive pulmonary disease, unspecified COPD type ([facility]     ICD-10-CM: J44.9 ICD-9-CM: 987 Left hand weakness     ICD-10-CM: R29.898 ICD-9-CM: 728.87 Tobacco use     ICD-10-CM: Z72.0 ICD-9-CM: 305.1 Vitamin D deficiency     ICD-10-CM: E55.9 ICD-9-CM: 268.9 Fatigue, unspecified type     ICD-10-CM: R53.83 ICD-9-CM: 780.79 Vitals BP Pulse Temp Resp Height(growth percentile) Weight(growth percentile) 106/69 (BP 1 Location: Left arm, BP Patient Position: Sitting) 80 98.5 °F (36.9 °C) (Oral) 16 5' 4\" (1.626 m) 144 lb 9.6 oz (65.6 kg) LMP SpO2 BMI OB Status Smoking Status 06/25/2018 (Approximate) 96% 24.82 kg/m2 Having regular periods Current Every Day Smoker Vitals History BMI and BSA Data Body Mass Index Body Surface Area  
 24.82 kg/m 2 1.72 m 2 Preferred Pharmacy Pharmacy Name Phone [id number] [address] [address] [phone] Your Updated Medication List  
  
   
This list is accurate as of 7/2/18 10:05 AM.  Always use your most recent med list.  
  
  
  
  
 * albuterol 2.5 mg /3 mL (0.083 %) nebulizer solution Commonly known as:  PROVENTIL VENTOLIN  
by Nebulization route as needed for Wheezing. * albuterol 90 mcg/actuation inhaler Commonly known as:  PROVENTIL HFA, VENTOLIN HFA, PROAIR HFA Take 1 Puff by inhalation every four (4) hours as needed for Wheezing. albuterol-ipratropium 2.5 mg-0.5 mg/3 ml Nebu Commonly known as:  DUO-NEB  
3 mL by Nebulization route every four (4) hours as needed. buPROPion  mg SR tablet Commonly known as:  Brenna Humphries Take 1 Tab by mouth two (2) times a day. Take daily x 3 days then bid CLARITIN 10 mg tablet Generic drug:  loratadine Take 10 mg by mouth as needed. gabapentin 300 mg capsule Commonly known as:  NEURONTIN Take 300 mg by mouth three (3) times daily. levothyroxine 200 mcg tablet Commonly known as:  SYNTHROID Take 1 Tab by mouth Daily (before breakfast). MOTRIN  mg tablet Generic drug:  ibuprofen Take 200 mg by mouth as needed. PARoxetine 40 mg tablet Commonly known as:  PAXIL Take 1 Tab by mouth daily. TYLENOL 325 mg tablet Generic drug:  acetaminophen Take  by mouth every four (4) hours as needed for Pain. * Notice: This list has 2 medication(s) that are the same as other medications prescribed for you. Read the directions carefully, and ask your doctor or other care provider to review them with you. Prescriptions Sent to Pharmacy Refills buPROPion SR (WELLBUTRIN SR) 150 mg SR tablet 3 Sig: Take 1 Tab by mouth two (2) times a day. Take daily x 3 days then bid Class: Normal  
 Pharmacy: [id number] Judy Churchill, [address]  #: [phone] Route: Oral  
  
We Performed the Following CBC WITH AUTOMATED DIFF [68891 CPT(R)] LIPID PANEL [54549 CPT(R)] METABOLIC PANEL, COMPREHENSIVE [83708 CPT(R)] REFERRAL TO NEUROSURGERY [EVS62 Custom] REFERRAL TO OBSTETRICS AND GYNECOLOGY [REF51 Custom] TSH RFX ON ABNORMAL TO FREE T4 [[id number] Custom] VITAMIN D, 25 HYDROXY [id number] CPT(R)] Follow-up Instructions Return in about 3 months (around 10/2/2018) for med eval.  
  
To-Do List   
 07/31/2018 Imaging:  [facility] BI SCREENING INCL CAD Referral Information Referral ID Referred By Referred To  
  
 [id number] SHAYLEE, [address] Visits Status Start Date End Date 1 New Request 7/2/18 7/2/19 If your referral has a status of pending review or denied, additional information will be sent to support the outcome of this decision. Referral ID Referred By Referred To  
 [id number] SHAYLEE, 2623 [facility] Department of Neurosurgery [id number] [place] Visits Status Start Date End Date 1 New Request 7/2/18 7/2/19 If your referral has a status of pending review or denied, additional information will be sent to support the outcome of this decision. Patient Instructions Deciding About Using Medicines To Quit Smoking Deciding About Using Medicines To Quit Smoking What are the medicines you can use? Your doctor may prescribe varenicline (Chantix) or bupropion (Zyban). These medicines can help you cope with cravings for tobacco. They are pills that don't contain nicotine. You also can use nicotine replacement products. These do contain nicotine. There are many types. · Gum and lozenges slowly release nicotine into your mouth. · Patches stick to your skin. They slowly release nicotine into your bloodstream. 
· An inhaler has a leon that contains nicotine. You breathe in a puff of nicotine vapor through your mouth and throat. · Nasal spray releases a mist that contains nicotine. What are key points about this decision? · Using medicines can double your chances of quitting smoking. They can ease cravings and withdrawal symptoms. · Getting counseling along with using medicine can raise your chances of quitting even more. · If you smoke fewer than 5 cigarettes a day, you may not need medicines to help you quit smoking. · These medicines have less nicotine than cigarettes. And by itself, nicotine is not nearly as harmful as smoking. The tars, carbon monoxide, and other toxic chemicals in tobacco cause the harmful effects. · The side effects of nicotine replacement products depend on the type of product. For example, a patch can make your skin red and itchy. Medicines in pill form can make you sick to your stomach. They can also cause dry mouth and trouble sleeping. For most people, the side effects are not bad enough to make them stop using the products. Why might you choose to use medicines to quit smoking? · You have tried on your own to stop smoking, but you were not able to stop. · You smoke more than 5 cigarettes a day. · You want to increase your chances of quitting smoking. · You want to reduce your cravings and withdrawal symptoms. · You feel the benefits of medicine outweigh the side effects. Why might you choose not to use medicine? · You want to try quitting on your own by stopping all at once (\"cold turkey\"). · You want to cut back slowly on the number of cigarettes you smoke. · You smoke fewer than 5 cigarettes a day. · You do not like using medicine. · You feel the side effects of medicines outweigh the benefits. · You are worried about the cost of medicines. Your decision Thinking about the facts and your feelings can help you make a decision that is right for you. Be sure you understand the benefits and risks of your options, and think about what else you need to do before you make the decision. Where can you learn more? Go to http://steffi-magdalena.info/. Enter R135 in the search box to learn more about \"Deciding About Using Medicines To Quit Smoking. \" Current as of: March 20, 2017 Content Version: 11.4 © 8597-6855 [employer]What. Care instructions adapted under license by [employer] (which disclaims liability or warranty for this information). If you have questions about a medical condition or this instruction, always ask your healthcare professional. [address] [place] [address] any warranty or liability for your use of this information. Introducing hospitals & HEALTH SERVICES! Dear Brianne Acharya: Thank you for requesting a RNDOMN account. Our records indicate that you have previously registered for a RNDOMN account but its currently inactive. Please call our RNDOMN support line at [phone]. Additional Information If you have questions, please visit the Frequently Asked Questions section of the RNDOMN website at [email or web address] Care IT. Sk[employer]ide[employer]/. Remember, Reclutect is NOT to be used for urgent needs. For medical emergencies, dial 911. Now available from your iPhone and Android! Please provide this summary of care documentation to your next provider. Your primary care clinician is listed as Lloyd Freeman. If you have any questions after today's visit, please call [phone].

## 2018-07-02 NOTE — PROGRESS NOTES
Verito Diaz  Identified pt with two pt identifiers(name and ). Chief Complaint   Patient presents with    Pain (Chronic)     Room 2B// Fasting     Thyroid Problem       1. Have you been to the ER, urgent care clinic since your last visit? Hospitalized since your last visit? NO    2. Have you seen or consulted any other health care providers outside of the [address] [place] [address] since your last visit? Include any pap smears or colon screening. NO      Provider notified of reason for visit, vitals and flowsheets obtained on patients. Patient received paperwork for advance directive during previous visit but has not completed at this time     Reviewed record In preparation for visit, huddled with provider and have obtained necessary documentation      Health Maintenance Due   Topic    PAP AKA CERVICAL CYTOLOGY        Wt Readings from Last 3 Encounters:   18 146 lb 6.4 oz (66.4 kg)   18 147 lb (66.7 kg)   17 135 lb (61.2 kg)     Temp Readings from Last 3 Encounters:   18 97.9 °F (36.6 °C) (Oral)   18 98.1 °F (36.7 °C) (Oral)   17 98.2 °F (36.8 °C) (Oral)     BP Readings from Last 3 Encounters:   18 123/80   18 121/75   17 117/71     Pulse Readings from Last 3 Encounters:   18 76   18 75   17 (!) 103     There were no vitals filed for this visit.       Learning Assessment:  :     Learning Assessment 2014   PRIMARY LEARNER Patient   HIGHEST LEVEL OF EDUCATION - PRIMARY LEARNER  DID NOT GRADUATE HIGH SCHOOL   BARRIERS PRIMARY LEARNER NONE   PRIMARY LANGUAGE ENGLISH    NEED No   LEARNER PREFERENCE PRIMARY DEMONSTRATION   ANSWERED BY patient   RELATIONSHIP SELF       Depression Screening:  :     PHQ over the last two weeks 2017   Little interest or pleasure in doing things Not at all   Feeling down, depressed or hopeless Not at all   Total Score PHQ 2 0       Fall Risk Assessment:  :     No flowsheet data found.    Abuse Screening:  :     No flowsheet data found. ADL Screening:  :     ADL Assessment 3/17/2015   Feeding yourself No Help Needed   Getting from bed to chair No Help Needed   Getting dressed No Help Needed   Bathing or showering No Help Needed   Walk across the room (includes cane/walker) No Help Needed   Using the telphone No Help Needed   Taking your medications No Help Needed   Preparing meals No Help Needed   Managing money (expenses/bills) No Help Needed   Moderately strenuous housework (laundry) No Help Needed   Shopping for personal items (toiletries/medicines) No Help Needed   Shopping for groceries No Help Needed   Driving No Help Needed   Climbing a flight of stairs No Help Needed   Getting to places beyond walking distances No Help Needed         Medication reconciliation up to date and corrected with patient at this time.

## 2018-07-02 NOTE — PATIENT INSTRUCTIONS
Deciding About Using Medicines To Quit Smoking  Deciding About Using Medicines To Quit Smoking  What are the medicines you can use? Your doctor may prescribe varenicline (Chantix) or bupropion (Zyban). These medicines can help you cope with cravings for tobacco. They are pills that don't contain nicotine. You also can use nicotine replacement products. These do contain nicotine. There are many types. · Gum and lozenges slowly release nicotine into your mouth. · Patches stick to your skin. They slowly release nicotine into your bloodstream.  · An inhaler has a leon that contains nicotine. You breathe in a puff of nicotine vapor through your mouth and throat. · Nasal spray releases a mist that contains nicotine. What are key points about this decision? · Using medicines can double your chances of quitting smoking. They can ease cravings and withdrawal symptoms. · Getting counseling along with using medicine can raise your chances of quitting even more. · If you smoke fewer than 5 cigarettes a day, you may not need medicines to help you quit smoking. · These medicines have less nicotine than cigarettes. And by itself, nicotine is not nearly as harmful as smoking. The tars, carbon monoxide, and other toxic chemicals in tobacco cause the harmful effects. · The side effects of nicotine replacement products depend on the type of product. For example, a patch can make your skin red and itchy. Medicines in pill form can make you sick to your stomach. They can also cause dry mouth and trouble sleeping. For most people, the side effects are not bad enough to make them stop using the products. Why might you choose to use medicines to quit smoking? · You have tried on your own to stop smoking, but you were not able to stop. · You smoke more than 5 cigarettes a day. · You want to increase your chances of quitting smoking. · You want to reduce your cravings and withdrawal symptoms.   · You feel the benefits of medicine outweigh the side effects. Why might you choose not to use medicine? · You want to try quitting on your own by stopping all at once (\"cold turkey\"). · You want to cut back slowly on the number of cigarettes you smoke. · You smoke fewer than 5 cigarettes a day. · You do not like using medicine. · You feel the side effects of medicines outweigh the benefits. · You are worried about the cost of medicines. Your decision  Thinking about the facts and your feelings can help you make a decision that is right for you. Be sure you understand the benefits and risks of your options, and think about what else you need to do before you make the decision. Where can you learn more? Go to http://steffi-magdalena.info/. Enter B492 in the search box to learn more about \"Deciding About Using Medicines To Quit Smoking. \"  Current as of: March 20, 2017  Content Version: 11.4  © 7649-9819 Healthwise, Incorporated. Care instructions adapted under license by [employer] (which disclaims liability or warranty for this information). If you have questions about a medical condition or this instruction, always ask your healthcare professional. [place] [address] any warranty or liability for your use of this information.

## 2018-07-02 NOTE — PROGRESS NOTES
HPI  Ms. Loy Barron is a 50y.o. year old female, she is seen today for follow up neuropathic pain, anxiety, hypothyroidism. Says she felt more achy, more anxious on cymbalta compared to paxil. Mood has been up and down with transition to paxil, no SI. Some mood swings switching to paxil. Doesn't have a lot of energy normally, little more tired lately. No missed doses of levothyroxine, compliant. Continues to have neuropathic pain left hand and arm, from neck to fingers, worse in elbow and left hand. Also has weakness in left hand, seems to be getting worse. Regarding copd overall doing well except in extreme humidity. Uses albuterol about tid in humid weather. Was on symbicort but insurance wouldn't cover. [address] Dew daily + coffee. Sometimes difficulty sleeping, feels tired but jittery. Chief Complaint   Patient presents with    Pain (Chronic)     Room 2B// Fasting // overdue for pap, wants gyn referral     Thyroid Problem        Prior to Admission medications    Medication Sig Start Date End Date Taking? Authorizing Provider   buPROPion SR [facility] SR) 150 mg SR tablet Take 1 Tab by mouth two (2) times a day. Take daily x 3 days then bid 7/2/18  Yes Alberto Bowman MD   PARoxetine (PAXIL) 40 mg tablet Take 1 Tab by mouth daily. 6/26/18  Yes Alberto Bowman MD   levothyroxine (SYNTHROID) 200 mcg tablet Take 1 Tab by mouth Daily (before breakfast). 2/8/18  Yes Alberto Bowman MD   gabapentin (NEURONTIN) 300 mg capsule Take 300 mg by mouth three (3) times daily. Yes Historical Provider   albuterol-ipratropium (DUO-NEB) 2.5 mg-0.5 mg/3 ml nebu 3 mL by Nebulization route every four (4) hours as needed. 6/14/17  Yes John Vyas MD   albuterol (PROVENTIL VENTOLIN) 2.5 mg /3 mL (0.083 %) nebulizer solution by Nebulization route as needed for Wheezing.    Yes Historical Provider   albuterol (PROVENTIL HFA, VENTOLIN HFA, PROAIR HFA) 90 mcg/actuation inhaler Take 1 Puff by inhalation every four (4) hours as needed for Wheezing. 2/6/17  Yes Saurabh Caceres MD   ibuprofen (MOTRIN IB) 200 mg tablet Take 200 mg by mouth as needed. Yes Historical Provider   acetaminophen (TYLENOL) 325 mg tablet Take  by mouth every four (4) hours as needed for Pain. Yes Historical Provider   loratadine (CLARITIN) 10 mg tablet Take 10 mg by mouth as needed. Yes Historical Provider         Allergies   Allergen Reactions    Hydrocodone Hives         REVIEW OF SYSTEMS:  Per HPI    PHYSICAL EXAM:  Visit Vitals    /69 (BP 1 Location: Left arm, BP Patient Position: Sitting)    Pulse 80    Temp 98.5 °F (36.9 °C) (Oral)    Resp 16    Ht 5' 4\" (1.626 m)    Wt 144 lb 9.6 oz (65.6 kg)    LMP 06/25/2018 (Approximate)    SpO2 96%    BMI 24.82 kg/m2     Constitutional: Appears well-developed and well-nourished. No distress. HENT:   Head: Normocephalic and atraumatic. Eyes: No scleral icterus. Neck: decreased rom flexion and extension due to pain  Cardiovascular: Normal S1/S2, regular rhythm. No murmurs, rubs, or gallops. Pulmonary/Chest: Effort normal and breath sounds normal. No respiratory distress. No wheezes, rhonchi, or rales. Ext: No edema. 2+ radial pulses b/l  Neurological: Alert.  strength 5/5 right hand, 4/5 left hand but cannot full extend or flex fingers 3-5 left hand, +hyperalgesia fingers 3-5 left hand, +atrophy left forearm compared to right  Psychiatric: Normal mood and affect.  Behavior is normal.     Lab Results   Component Value Date/Time    Sodium 143 02/05/2018 03:09 PM    Potassium 4.2 02/05/2018 03:09 PM    Chloride 100 02/05/2018 03:09 PM    CO2 30 (H) 02/05/2018 03:09 PM    Anion gap 9 05/02/2017 11:30 AM    Glucose 76 02/05/2018 03:09 PM    BUN 10 02/05/2018 03:09 PM    Creatinine 0.56 (L) 02/05/2018 03:09 PM    BUN/Creatinine ratio 18 02/05/2018 03:09 PM    GFR est  02/05/2018 03:09 PM    GFR est non- 02/05/2018 03:09 PM    Calcium 8.8 02/05/2018 03:09 PM    Bilirubin, total <0.2 02/05/2018 03:09 PM    AST (SGOT) 17 02/05/2018 03:09 PM    Alk. phosphatase 66 02/05/2018 03:09 PM    Protein, total 6.3 02/05/2018 03:09 PM    Albumin 4.3 02/05/2018 03:09 PM    Globulin 3.3 05/02/2017 11:30 AM    A-G Ratio 2.2 02/05/2018 03:09 PM    ALT (SGPT) 10 02/05/2018 03:09 PM     Lab Results   Component Value Date/Time    Hemoglobin A1c 5.5 05/02/2017 11:30 AM      No results found for: CHOL, CHOLPOCT, CHOLX, CHLST, CHOLV, HDL, HDLPOC, LDL, LDLCPOC, LDLC, DLDLP, VLDLC, VLDL, TGLX, TRIGL, TRIGP, TGLPOCT, CHHD, CHHDX       ASSESSMENT/PLAN  Diagnoses and all orders for this visit:    1. Spinal stenosis in cervical region  -     REFERRAL TO NEUROSURGERY    2. Neuropathic pain  -     REFERRAL TO NEUROSURGERY    3. Generalized anxiety disorder  In process of stopping cymbalta and restarting paxil - stable- also decrease caffeine  4. Hypothyroidism, unspecified type  -     LIPID PANEL  -     METABOLIC PANEL, COMPREHENSIVE  -     TSH RFX ON ABNORMAL TO FREE T4    5. Cervical cancer screening  -     [facility]    6. Breast cancer screening  -     [facility] MAMMO BI SCREENING INCL CAD; Future    7. Chronic obstructive pulmonary disease, unspecified COPD type ([facility]  Stable, encouraged smoking cessation - will rx symbicort which she has had in past but has been too expensive - she will try to get copay card  8. Left hand weakness  -     REFERRAL TO NEUROSURGERY  Patient agrees to go for pain, weakness  9. Tobacco use  -     buPROPion SR (WELLBUTRIN SR) 150 mg SR tablet; Take 1 Tab by mouth two (2) times a day. Take daily x 3 days then bid  Start wellbutrin at least one week prior to quit date but wait until adjusted to paxil  10. Vitamin D deficiency  -     VITAMIN D, 25 HYDROXY    11.  Fatigue, unspecified type  -     CBC WITH AUTOMATED DIFF  Likely due to overconsumption of caffeine - will gradually decrease for initial goal of one 20oz mt dew daily        There are no preventive care reminders to display for this patient. Follow-up Disposition:  Return in about 3 months (around 10/2/2018) for med eval.       Reviewed plan of care. Patient has provided input and agrees with goals. The nurse provided the patient and/or family with advanced directive information if needed and encouraged the patient to provide a copy to the office when available.

## 2018-07-03 LAB
25(OH)D3+25(OH)D2 SERPL-MCNC: 29 NG/ML (ref 30–100)
ALBUMIN SERPL-MCNC: 4.4 G/DL (ref 3.5–5.5)
ALBUMIN/GLOB SERPL: 2 {RATIO} (ref 1.2–2.2)
ALP SERPL-CCNC: 81 IU/L (ref 39–117)
ALT SERPL-CCNC: 8 IU/L (ref 0–32)
AST SERPL-CCNC: 15 IU/L (ref 0–40)
BASOPHILS # BLD AUTO: 0.1 X10E3/UL (ref 0–0.2)
BASOPHILS NFR BLD AUTO: 1 %
BILIRUB SERPL-MCNC: 0.3 MG/DL (ref 0–1.2)
BUN SERPL-MCNC: 9 MG/DL (ref 6–24)
BUN/CREAT SERPL: 17 (ref 9–23)
CALCIUM SERPL-MCNC: 9.1 MG/DL (ref 8.7–10.2)
CHLORIDE SERPL-SCNC: 102 MMOL/L (ref 96–106)
CHOLEST SERPL-MCNC: 181 MG/DL (ref 100–199)
CO2 SERPL-SCNC: 21 MMOL/L (ref 20–29)
CREAT SERPL-MCNC: 0.54 MG/DL (ref 0.57–1)
EOSINOPHIL # BLD AUTO: 0.1 X10E3/UL (ref 0–0.4)
EOSINOPHIL NFR BLD AUTO: 2 %
ERYTHROCYTE [DISTWIDTH] IN BLOOD BY AUTOMATED COUNT: 12.5 % (ref 12.3–15.4)
GLOBULIN SER CALC-MCNC: 2.2 G/DL (ref 1.5–4.5)
GLUCOSE SERPL-MCNC: 87 MG/DL (ref 65–99)
HCT VFR BLD AUTO: 40.7 % (ref 34–46.6)
HDLC SERPL-MCNC: 39 MG/DL
HGB BLD-MCNC: 13.7 G/DL (ref 11.1–15.9)
IMM GRANULOCYTES # BLD: 0 X10E3/UL (ref 0–0.1)
IMM GRANULOCYTES NFR BLD: 0 %
LDLC SERPL CALC-MCNC: 115 MG/DL (ref 0–99)
LYMPHOCYTES # BLD AUTO: 1.2 X10E3/UL (ref 0.7–3.1)
LYMPHOCYTES NFR BLD AUTO: 22 %
MCH RBC QN AUTO: 32.5 PG (ref 26.6–33)
MCHC RBC AUTO-ENTMCNC: 33.7 G/DL (ref 31.5–35.7)
MCV RBC AUTO: 96 FL (ref 79–97)
MONOCYTES # BLD AUTO: 0.5 X10E3/UL (ref 0.1–0.9)
MONOCYTES NFR BLD AUTO: 9 %
NEUTROPHILS # BLD AUTO: 3.5 X10E3/UL (ref 1.4–7)
NEUTROPHILS NFR BLD AUTO: 66 %
PLATELET # BLD AUTO: 295 X10E3/UL (ref 150–379)
POTASSIUM SERPL-SCNC: 4.4 MMOL/L (ref 3.5–5.2)
PROT SERPL-MCNC: 6.6 G/DL (ref 6–8.5)
RBC # BLD AUTO: 4.22 X10E6/UL (ref 3.77–5.28)
SODIUM SERPL-SCNC: 141 MMOL/L (ref 134–144)
T4 FREE SERPL-MCNC: 2.26 NG/DL (ref 0.82–1.77)
TRIGL SERPL-MCNC: 133 MG/DL (ref 0–149)
TSH SERPL DL<=0.005 MIU/L-ACNC: 0.01 UIU/ML (ref 0.45–4.5)
VLDLC SERPL CALC-MCNC: 27 MG/DL (ref 5–40)
WBC # BLD AUTO: 5.3 X10E3/UL (ref 3.4–10.8)

## 2018-07-03 RX ORDER — LEVOTHYROXINE SODIUM 150 UG/1
150 TABLET ORAL
Qty: 90 TAB | Refills: 1 | Status: SHIPPED | OUTPATIENT
Start: 2018-07-03 | End: 2019-03-30 | Stop reason: SDUPTHER

## 2018-07-03 NOTE — PROGRESS NOTES
Tell her too much thyroid hormone (levothyroxine) now so dose is too high - new dose is 150mcg daily with repeat tsh reflex free t4 in 12 weeks - cholesterol okay - vit d little low - make sure taking vit D 1000IU daily - 30 is normal - normal kidney and liver labs

## 2018-09-17 RX ORDER — GABAPENTIN 300 MG/1
300 CAPSULE ORAL 3 TIMES DAILY
Qty: 90 CAP | Refills: 3 | Status: SHIPPED | OUTPATIENT
Start: 2018-09-17 | End: 2019-01-11 | Stop reason: SDUPTHER

## 2018-09-17 NOTE — TELEPHONE ENCOUNTER
REFILL     PCP: Tavia Streeter MD     Last appt: 7/2/2018   Future Appointments  Date Time Provider Chuy You   10/29/2018 10:00 AM Tavia Streeter  [facility]        Requested Prescriptions     Pending Prescriptions Disp Refills    gabapentin (NEURONTIN) 300 mg capsule       Sig: Take 1 Cap by mouth three (3) times daily.

## 2018-10-29 ENCOUNTER — OFFICE VISIT (OUTPATIENT)
Dept: INTERNAL MEDICINE CLINIC | Facility: CLINIC | Age: 48
End: 2018-10-29

## 2018-10-29 VITALS
HEIGHT: 64 IN | HEART RATE: 75 BPM | DIASTOLIC BLOOD PRESSURE: 65 MMHG | TEMPERATURE: 98.1 F | OXYGEN SATURATION: 95 % | SYSTOLIC BLOOD PRESSURE: 106 MMHG | WEIGHT: 156.5 LBS | RESPIRATION RATE: 16 BRPM | BODY MASS INDEX: 26.72 KG/M2

## 2018-10-29 DIAGNOSIS — F41.1 GENERALIZED ANXIETY DISORDER: ICD-10-CM

## 2018-10-29 DIAGNOSIS — Z23 ENCOUNTER FOR IMMUNIZATION: ICD-10-CM

## 2018-10-29 DIAGNOSIS — J44.9 CHRONIC OBSTRUCTIVE PULMONARY DISEASE, UNSPECIFIED COPD TYPE (HCC): ICD-10-CM

## 2018-10-29 DIAGNOSIS — E03.9 HYPOTHYROIDISM, UNSPECIFIED TYPE: Primary | ICD-10-CM

## 2018-10-29 DIAGNOSIS — R29.898 HAND WEAKNESS: ICD-10-CM

## 2018-10-29 DIAGNOSIS — Z12.4 SCREENING FOR CERVICAL CANCER: ICD-10-CM

## 2018-10-29 DIAGNOSIS — M79.2 NEUROPATHIC PAIN: ICD-10-CM

## 2018-10-29 NOTE — PATIENT INSTRUCTIONS
Vaccine Information Statement Influenza (Flu) Vaccine (Inactivated or Recombinant): What you need to know Many Vaccine Information Statements are available in [place] and other languages. See www.immunize.org/vis Hojas de Información Sobre Vacunas están disponibles en Español y en muchos otros idiomas. Visite www.immunize.org/vis 1. Why get vaccinated? Influenza (flu) is a contagious disease that spreads around the [place] every year, usually between October and May. Flu is caused by influenza viruses, and is spread mainly by coughing, sneezing, and close contact. Anyone can get flu. Flu strikes suddenly and can last several days. Symptoms vary by age, but can include: 
 fever/chills  sore throat  muscle aches  fatigue  cough  headache  runny or stuffy nose Flu can also lead to pneumonia and blood infections, and cause diarrhea and seizures in children. If you have a medical condition, such as heart or lung disease, flu can make it worse. Flu is more dangerous for some people. Infants and young children, people 72years of age and older, pregnant women, and people with certain health conditions or a weakened immune system are at greatest risk. Each year thousands of people in the [facility] die from flu, and many more are hospitalized. Flu vaccine can: 
 keep you from getting flu, 
 make flu less severe if you do get it, and 
 keep you from spreading flu to your family and other people. 2. Inactivated and recombinant flu vaccines A dose of flu vaccine is recommended every flu season. Children 6 months through 6years of age may need two doses during the same flu season. Everyone else needs only one dose each flu season.   
 
 
Some inactivated flu vaccines contain a very small amount of a mercury-based preservative called thimerosal. Studies have not shown thimerosal in vaccines to be harmful, but flu vaccines that do not contain thimerosal are available. There is no live flu virus in flu shots. They cannot cause the flu. There are many flu viruses, and they are always changing. Each year a new flu vaccine is made to protect against three or four viruses that are likely to cause disease in the upcoming flu season. But even when the vaccine doesnt exactly match these viruses, it may still provide some protection Flu vaccine cannot prevent: 
 flu that is caused by a virus not covered by the vaccine, or 
 illnesses that look like flu but are not. It takes about 2 weeks for protection to develop after vaccination, and protection lasts through the flu season. 3. Some people should not get this vaccine Tell the person who is giving you the vaccine:  If you have any severe, life-threatening allergies. If you ever had a life-threatening allergic reaction after a dose of flu vaccine, or have a severe allergy to any part of this vaccine, you may be advised not to get vaccinated. Most, but not all, types of flu vaccine contain a small amount of egg protein.  If you ever had Guillain-Barré Syndrome (also called GBS). Some people with a history of GBS should not get this vaccine. This should be discussed with your doctor.  If you are not feeling well. It is usually okay to get flu vaccine when you have a mild illness, but you might be asked to come back when you feel better. 4. Risks of a vaccine reaction With any medicine, including vaccines, there is a chance of reactions. These are usually mild and go away on their own, but serious reactions are also possible. Most people who get a flu shot do not have any problems with it. Minor problems following a flu shot include:  
 soreness, redness, or swelling where the shot was given  hoarseness  sore, red or itchy eyes  cough  fever  aches  headache  itching  fatigue If these problems occur, they usually begin soon after the shot and last 1 or 2 days. More serious problems following a flu shot can include the following:  There may be a small increased risk of Guillain-Barré Syndrome (GBS) after inactivated flu vaccine. This risk has been estimated at 1 or 2 additional cases per million people vaccinated. This is much lower than the risk of severe complications from flu, which can be prevented by flu vaccine.  Young children who get the flu shot along with pneumococcal vaccine (PCV13) and/or DTaP vaccine at the same time might be slightly more likely to have a seizure caused by fever. Ask your doctor for more information. Tell your doctor if a child who is getting flu vaccine has ever had a seizure. Problems that could happen after any injected vaccine:  People sometimes faint after a medical procedure, including vaccination. Sitting or lying down for about 15 minutes can help prevent fainting, and injuries caused by a fall. Tell your doctor if you feel dizzy, or have vision changes or ringing in the ears.  Some people get severe pain in the shoulder and have difficulty moving the arm where a shot was given. This happens very rarely.  Any medication can cause a severe allergic reaction. Such reactions from a vaccine are very rare, estimated at about 1 in a million doses, and would happen within a few minutes to a few hours after the vaccination. As with any medicine, there is a very remote chance of a vaccine causing a serious injury or death. The safety of vaccines is always being monitored. For more information, visit: www.cdc.gov/vaccinesafety/ 
 
5. What if there is a serious reaction? What should I look for?  Look for anything that concerns you, such as signs of a severe allergic reaction, very high fever, or unusual behavior.  
 
Signs of a severe allergic reaction can include hives, swelling of the face and throat, difficulty breathing, a fast heartbeat, dizziness, and weakness  usually within a few minutes to a few hours after the vaccination. What should I do?  If you think it is a severe allergic reaction or other emergency that cant wait, call 9-1-1 and get the person to the nearest hospital. Otherwise, call your doctor.  Reactions should be reported to the Vaccine Adverse Event Reporting System (VAERS). Your doctor should file this report, or you can do it yourself through  the VAERS web site at www.vaers. [facility].gov, or by calling 6-357.808.4017. VAERS does not give medical advice. 6. The National Vaccine Injury Compensation Program 
 
The [facility] Vaccine Injury Compensation Program (VICP) is a federal program that was created to compensate people who may have been injured by certain vaccines. Persons who believe they may have been injured by a vaccine can learn about the program and about filing a claim by calling 7-930.626.6668 or visiting the [employer] website at www.[facility].gov/vaccinecompensation. There is a time limit to file a claim for compensation. 7. How can I learn more?  Ask your healthcare provider. He or she can give you the vaccine package insert or suggest other sources of information.  Call your local or state health department.  Contact the Centers for Disease Control and Prevention (CDC): 
- Call 5-576.559.6121 (1-800-CDC-INFO) or 
- Visit CDCs website at www.cdc.gov/flu Vaccine Information Statement Inactivated Influenza Vaccine 8/7/2015 
[address] [id number]-31 Department of [facility] and [place] Centers for Disease Control and Prevention Office Use Only Stopping Smoking: Care Instructions Your Care Instructions Cigarette smokers crave the nicotine in cigarettes. Giving it up is much harder than simply changing a habit. Your body has to stop craving the nicotine. It is hard to quit, but you can do it.  There are many tools that people use to quit smoking. You may find that combining tools works best for you. There are several steps to quitting. First you get ready to quit. Then you get support to help you. After that, you learn new skills and behaviors to become a nonsmoker. For many people, a necessary step is getting and using medicine. Your doctor will help you set up the plan that best meets your needs. You may want to attend a smoking cessation program to help you quit smoking. When you choose a program, look for one that has proven success. Ask your doctor for ideas. You will greatly increase your chances of success if you take medicine as well as get counseling or join a cessation program. 
Some of the changes you feel when you first quit tobacco are uncomfortable. Your body will miss the nicotine at first, and you may feel short-tempered and grumpy. You may have trouble sleeping or concentrating. Medicine can help you deal with these symptoms. You may struggle with changing your smoking habits and rituals. The last step is the tricky one: Be prepared for the smoking urge to continue for a time. This is a lot to deal with, but keep at it. You will feel better. Follow-up care is a key part of your treatment and safety. Be sure to make and go to all appointments, and call your doctor if you are having problems. It's also a good idea to know your test results and keep a list of the medicines you take. How can you care for yourself at home? · Ask your family, friends, and coworkers for support. You have a better chance of quitting if you have help and support. · Join a support group, such as Nicotine Anonymous, for people who are trying to quit smoking. · Consider signing up for a smoking cessation program, such as the American Lung Association's Freedom from Smoking program. 
· Get text messaging support. Go to the website at www.smokefree. gov to sign up for the [facility] program. 
 · Set a quit date. Pick your date carefully so that it is not right in the middle of a big deadline or stressful time. Once you quit, do not even take a puff. Get rid of all ashtrays and lighters after your last cigarette. Clean your house and your clothes so that they do not smell of smoke. · Learn how to be a nonsmoker. Think about ways you can avoid those things that make you reach for a cigarette. ? Avoid situations that put you at greatest risk for smoking. For some people, it is hard to have a drink with friends without smoking. For others, they might skip a coffee break with coworkers who smoke. ? Change your daily routine. Take a different route to work or eat a meal in a different place. · Cut down on stress. Calm yourself or release tension by doing an activity you enjoy, such as reading a book, taking a hot bath, or gardening. · Talk to your doctor or pharmacist about nicotine replacement therapy, which replaces the nicotine in your body. You still get nicotine but you do not use tobacco. Nicotine replacement products help you slowly reduce the amount of nicotine you need. These products come in several forms, many of them available over-the-counter: ? Nicotine patches ? Nicotine gum and lozenges ? Nicotine inhaler · Ask your doctor about bupropion (Wellbutrin) or varenicline (Chantix), which are prescription medicines. They do not contain nicotine. They help you by reducing withdrawal symptoms, such as stress and anxiety. · Some people find hypnosis, acupuncture, and massage helpful for ending the smoking habit. · Eat a healthy diet and get regular exercise. Having healthy habits will help your body move past its craving for nicotine. · Be prepared to keep trying. Most people are not successful the first few times they try to quit. Do not get mad at yourself if you smoke again.  Make a list of things you learned and think about when you want to try again, such as next week, next month, or next year. Where can you learn more? Go to http://steffi-magdalena.info/. Enter U843 in the search box to learn more about \"Stopping Smoking: Care Instructions. \" Current as of: November 29, 2017 Content Version: 11.8 © 1562-7277 [employer]. Care instructions adapted under license by [employer] (which disclaims liability or warranty for this information). If you have questions about a medical condition or this instruction, always ask your healthcare professional. [place] [address] any warranty or liability for your use of this information. Learning About Caffeine What is caffeine? Caffeine is a natural element found in the leaves or seeds of coffee and cocoa beans, annette nuts, and tea leaves. People often drink coffee, tea, or energy drinks that contain caffeine for caffeine's stimulating effect on the body. After you drink or eat a food that contains caffeine, you may feel more alert and able to concentrate better. Many people drink beverages with caffeine to wake up in the morning or to stay alert when they drive. Most coffee drinkers drink about two cups a day. Depending on the size of the cups and what process was used to make the coffee, their intake of caffeine is between 200 to 280 mg a day. It's a good idea for adults to consume no more than 400 mg of caffeine a day. It's best for pregnant women to avoid or limit caffeine. The American Academy of Pediatrics recommends that children and teens not use energy drinks. How much caffeine is in beverages and food? Caffeine is found in many types of drinks and in chocolate. Some medicines for pain relief and weight loss also contain caffeine. Average amount of caffeine in drinks, per serving · Black or green tea, 16 oz = 60100 mg · Coffee, brewed drip, 6 oz = 103 mg · Coffee, brewed percolator, 6 oz = 75 mg · Energy drinks, 8 oz = 75 mg 
 · Espresso, 1 oz = 40 mg · Regular or diet rodney, 12 oz = 3550 mg Average amount of caffeine in chocolate, per serving · Chocolate, 1 oz = 820 mg · Chocolate milk, 8 oz = 8 mg · Hot chocolate, 6 oz = 4 mg How does caffeine affect your health? People react to caffeine in different ways. In general, caffeine stays in your body for 4 to 6 hours. Some people are more sensitive to it than others. For example, some people drink coffee all day long and still get a good night's rest. Others feel the effects of caffeine longer. This can lead to sleepless or restless nights. After ingesting caffeine, people who are sensitive to it may: 
· Have a faster heartbeat. · Get a headache or an upset stomach. · Feel more nervous or shaky. · Feel dizzy. If you have bad reactions to caffeine, you might think about cutting back on how much you get in your beverages and food. How do you cut back on caffeine? If you decide to lower the amount of caffeine in your diet, think about doing it slowly. If you quit caffeine suddenly, you may have symptoms of depression, headaches, and muscle aches. Feeling angry or irritable is another common reaction. You might try: · Cutting back by one cup of a caffeinated drink a day until you reach the level you want. · Mixing decaffeinated and regular coffee. · Brewing your tea a shorter length of time. Where can you learn more? Go to http://steffi-magdalena.info/. Enter 429 [id number] in the search box to learn more about \"Learning About Caffeine. \" Current as of: March 29, 2018 Content Version: 11.8 © 7188-6485 [employer]. Care instructions adapted under license by [employer] (which disclaims liability or warranty for this information). If you have questions about a medical condition or this instruction, always ask your healthcare professional. [address] any warranty or liability for your use of this information.

## 2018-10-29 NOTE — PROGRESS NOTES
Roberto Carlos Jennifer  Identified pt with two pt identifiers(name and ). Chief Complaint Patient presents with  Medication Evaluation Room 2A// NON Fasting (coffee w/ cream and sugar) 1. Have you been to the ER, urgent care clinic since your last visit? Hospitalized since your last visit? NO 
 
2. Have you seen or consulted any other health care providers outside of the [address] [place] [address] since your last visit? Include any pap smears or colon screening. NO Provider notified of reason for visit, vitals and flowsheets obtained on patients. Patient received paperwork for advance directive during previous visit but has not completed at this time Reviewed record In preparation for visit, huddled with provider and have obtained necessary documentation Health Maintenance Due Topic  PAP AKA CERVICAL CYTOLOGY  DTaP/Tdap/Td series (2 - Td)  Influenza Age 5 to Adult Wt Readings from Last 3 Encounters:  
10/29/18 156 lb 8 oz (71 kg) 18 144 lb 9.6 oz (65.6 kg) 18 146 lb 6.4 oz (66.4 kg) Temp Readings from Last 3 Encounters:  
18 98.5 °F (36.9 °C) (Oral) 18 97.9 °F (36.6 °C) (Oral) 18 98.1 °F (36.7 °C) (Oral) BP Readings from Last 3 Encounters:  
18 106/69  
18 123/80  
18 121/75 Pulse Readings from Last 3 Encounters:  
18 80  
18 76  
18 75 Vitals:  
 10/29/18 1000 Resp: 16 Weight: 156 lb 8 oz (71 kg) Height: 5' 4\" (1.626 m) PainSc:   0 - No pain LMP: 10/15/2018 Learning Assessment: 
:  
 
Learning Assessment 2014 PRIMARY LEARNER Patient HIGHEST LEVEL OF EDUCATION - PRIMARY LEARNER  DID NOT GRADUATE HIGH SCHOOL  
BARRIERS PRIMARY LEARNER NONE PRIMARY LANGUAGE ENGLISH  NEED No  
LEARNER PREFERENCE PRIMARY DEMONSTRATION  
ANSWERED BY patient RELATIONSHIP SELF Depression Screening: 
:  
 
PHQ over the last two weeks 10/29/2018 Little interest or pleasure in doing things Not at all Feeling down, depressed, irritable, or hopeless Not at all Total Score PHQ 2 0 Fall Risk Assessment: 
:  
 
No flowsheet data found. Abuse Screening: 
:  
 
Abuse Screening Questionnaire 10/29/2018 Do you ever feel afraid of your partner? Liza Framaxine Are you in a relationship with someone who physically or mentally threatens you? Liza Framaxine Is it safe for you to go home? Y  
 
 
ADL Screening: 
:  
 
ADL Assessment 3/17/2015 Feeding yourself No Help Needed Getting from bed to chair No Help Needed Getting dressed No Help Needed Bathing or showering No Help Needed Walk across the room (includes cane/walker) No Help Needed Using the telphone No Help Needed Taking your medications No Help Needed Preparing meals No Help Needed Managing money (expenses/bills) No Help Needed Moderately strenuous housework (laundry) No Help Needed Shopping for personal items (toiletries/medicines) No Help Needed Shopping for groceries No Help Needed Driving No Help Needed Climbing a flight of stairs No Help Needed Getting to places beyond walking distances No Help Needed Medication reconciliation up to date and corrected with patient at this time.

## 2018-10-29 NOTE — PROGRESS NOTES
HPI Ms. Shakira Adrian is a 50y.o. year old female, she is seen today for follow up anxiety. Plan last visit was to taper cymbalta, start paxil. Consider starting wellbutrin for smoking cessation. Is now on paxil, says she feels \"better\" but still has anxiety which is generalized but worse at times - for example stressful situations. Has been cutting back on caffeine - drinking more water. Was drinking 2L mt dew per day + coffee. Now has about 1/4 bottle left over at end of day and drinking a little less coffee. Has noticed increased urinary urgency. Hasn't seen nsgy yet for left arm weakness, pain. Gabapentin helps. Notes a lot more weakness in left arm. Avoiding seeing nsgy due to $50 co pay. Now noting little more neck pain and some tingling down right arm. Hasn't had pap in about 27 years. Will make appt to see gyn. Mother has h/o uterine CA. Chief Complaint Patient presents with  Medication Evaluation Room 2A// NON Fasting (coffee w/ cream and sugar) // need to schedule PAP Prior to Admission medications Medication Sig Start Date End Date Taking? Authorizing Provider  
gabapentin (NEURONTIN) 300 mg capsule Take 1 Cap by mouth three (3) times daily. 9/17/18  Yes Radha Black MD  
levothyroxine (SYNTHROID) 150 mcg tablet Take 1 Tab by mouth Daily (before breakfast). 7/3/18  Yes Radha Black MD  
budesonide-formoterol [facility]) 160-4.5 mcg/actuation HFAA Take 2 Puffs by inhalation two (2) times a day. 7/2/18  Yes Radha Black MD  
PARoxetine (PAXIL) 40 mg tablet Take 1 Tab by mouth daily. 6/26/18  Yes Radha Black MD  
albuterol-ipratropium (DUO-NEB) 2.5 mg-0.5 mg/3 ml nebu 3 mL by Nebulization route every four (4) hours as needed.  6/14/17  Yes Sheila Church MD  
albuterol (PROVENTIL HFA, VENTOLIN HFA, PROAIR HFA) 90 mcg/actuation inhaler Take 1 Puff by inhalation every four (4) hours as needed for Wheezing. 2/6/17  Yes Imtiaz Hinkle MD  
ibuprofen (MOTRIN IB) 200 mg tablet Take 200 mg by mouth as needed. Yes Provider, Historical  
acetaminophen (TYLENOL) 325 mg tablet Take  by mouth every four (4) hours as needed for Pain. Yes Provider, Historical  
loratadine (CLARITIN) 10 mg tablet Take 10 mg by mouth as needed. Yes Provider, Historical  
 
 
 
Allergies Allergen Reactions  Hydrocodone Hives REVIEW OF SYSTEMS: 
Per HPI PHYSICAL EXAM: 
Visit Vitals /65 (BP 1 Location: Left arm, BP Patient Position: Sitting) Pulse 75 Temp 98.1 °F (36.7 °C) (Oral) Resp 16 Ht 5' 4\" (1.626 m) Wt 156 lb 8 oz (71 kg) LMP 10/15/2018 (Approximate) SpO2 95% BMI 26.86 kg/m² Constitutional: Appears well-developed and well-nourished. No distress. HENT:  
Head: Normocephalic and atraumatic. Eyes: No scleral icterus. Neck: +tight trapezium b/l 
Cardiovascular: Normal S1/S2, regular rhythm. No murmurs, rubs, or gallops. Pulmonary/Chest: Effort normal and breath sounds normal. No respiratory distress. No wheezes, rhonchi, or rales. Ext: No edema. Neurological: Alert. Strength 5/5 b/l UE other than , extension and flexion LUE 4/5, +tinel's at right wrist 
Psychiatric: Normal mood and affect. Behavior is normal. 
  
Lab Results Component Value Date/Time Sodium 141 07/02/2018 10:13 AM  
 Potassium 4.4 07/02/2018 10:13 AM  
 Chloride 102 07/02/2018 10:13 AM  
 CO2 21 07/02/2018 10:13 AM  
 Anion gap 9 05/02/2017 11:30 AM  
 Glucose 87 07/02/2018 10:13 AM  
 BUN 9 07/02/2018 10:13 AM  
 Creatinine 0.54 (L) 07/02/2018 10:13 AM  
 BUN/Creatinine ratio 17 07/02/2018 10:13 AM  
 GFR est  07/02/2018 10:13 AM  
 GFR est non- 07/02/2018 10:13 AM  
 Calcium 9.1 07/02/2018 10:13 AM  
 Bilirubin, total 0.3 07/02/2018 10:13 AM  
 AST (SGOT) 15 07/02/2018 10:13 AM  
 Alk.  phosphatase 81 07/02/2018 10:13 AM  
 Protein, total 6.6 07/02/2018 10:13 AM  
 Albumin 4.4 07/02/2018 10:13 AM  
 Globulin 3.3 05/02/2017 11:30 AM  
 A-G Ratio 2.0 07/02/2018 10:13 AM  
 ALT (SGPT) 8 07/02/2018 10:13 AM  
 
Lab Results Component Value Date/Time Hemoglobin A1c 5.5 05/02/2017 11:30 AM  
  
Lab Results Component Value Date/Time Cholesterol, total 181 07/02/2018 10:13 AM  
 HDL Cholesterol 39 (L) 07/02/2018 10:13 AM  
 LDL, calculated 115 (H) 07/02/2018 10:13 AM  
 VLDL, calculated 27 07/02/2018 10:13 AM  
 Triglyceride 133 07/02/2018 10:13 AM  
  
 
 
ASSESSMENT/PLAN Diagnoses and all orders for this visit: 
 
1. Hypothyroidism, unspecified type 
-     TSH RFX ON ABNORMAL TO FREE T4 Check tft's - recent adjustment to dose about 3 mos ago 2. Encounter for immunization 
-     INFLUENZA VIRUS VAC QUAD,SPLIT,PRESV FREE SYRINGE IM 
-     MD IMMUNIZ ADMIN,1 SINGLE/COMB VAC/TOXOID 3. Screening for cervical cancer 
-     REFERRAL TO OBSTETRICS AND GYNECOLOGY 4. Neuropathic pain Tolerable on current regimen - but rec seeing nsgy due to weakness 5. Hand weakness See above - also suspect CTS right hand 6. Chronic obstructive pulmonary disease, unspecified COPD type ([facility] [address] Controlled on current regimen, continue  
encouraged smoking cessation 7. Generalized anxiety disorder Improved with paxil, but still needs to decrease caffeine Health Maintenance Due Topic Date Due  
 PAP AKA CERVICAL CYTOLOGY  05/18/2018  DTaP/Tdap/Td series (2 - Td) 08/01/2018  Influenza Age 5 to Adult  08/01/2018 Follow-up Disposition: 
Return in about 6 months (around 4/29/2019) for med eval, thyroid. Reviewed plan of care. Patient has provided input and agrees with goals. The nurse provided the patient and/or family with advanced directive information if needed and encouraged the patient to provide a copy to the office when available.

## 2018-10-30 LAB
T4 FREE SERPL-MCNC: 0.97 NG/DL (ref 0.82–1.77)
TSH SERPL DL<=0.005 MIU/L-ACNC: 9.62 UIU/ML (ref 0.45–4.5)

## 2018-11-02 NOTE — PROGRESS NOTES
Any missed doses of levothyroxine? If so make sure to take daily.   If no missed doses new dose is 150mcg daily except 1.5 tabs M/W and repeat tsh reflex free T4 3 mos

## 2018-11-07 NOTE — PROGRESS NOTES
Pt notified. States she has NO missed doses. Will begin on new dose of 150 mcg daily and 225 mcg on M/W. Will call back to schedule lab appt as she is at work and unable to schedule at this time.

## 2018-11-12 DIAGNOSIS — Z72.0 TOBACCO USE: ICD-10-CM

## 2018-11-12 RX ORDER — BUPROPION HYDROCHLORIDE 150 MG/1
TABLET, EXTENDED RELEASE ORAL
Qty: 60 TAB | Refills: 3 | Status: SHIPPED | OUTPATIENT
Start: 2018-11-12 | End: 2019-01-27

## 2019-01-13 RX ORDER — GABAPENTIN 300 MG/1
CAPSULE ORAL
Qty: 90 CAP | Refills: 3 | Status: SHIPPED | OUTPATIENT
Start: 2019-01-13 | End: 2019-08-05 | Stop reason: SDUPTHER

## 2019-01-27 ENCOUNTER — OFFICE VISIT (OUTPATIENT)
Dept: URGENT CARE | Age: 49
End: 2019-01-27

## 2019-01-27 VITALS
HEART RATE: 84 BPM | OXYGEN SATURATION: 97 % | RESPIRATION RATE: 16 BRPM | WEIGHT: 152 LBS | HEIGHT: 64 IN | BODY MASS INDEX: 25.95 KG/M2 | TEMPERATURE: 98.3 F | SYSTOLIC BLOOD PRESSURE: 140 MMHG | DIASTOLIC BLOOD PRESSURE: 69 MMHG

## 2019-01-27 DIAGNOSIS — J20.9 ACUTE BRONCHITIS, UNSPECIFIED ORGANISM: Primary | ICD-10-CM

## 2019-01-27 RX ORDER — BENZONATATE 200 MG/1
200 CAPSULE ORAL
Qty: 21 CAP | Refills: 0 | Status: SHIPPED | OUTPATIENT
Start: 2019-01-27 | End: 2019-02-03

## 2019-01-27 RX ORDER — PREDNISONE 20 MG/1
TABLET ORAL
Qty: 12 TAB | Refills: 0 | Status: SHIPPED | OUTPATIENT
Start: 2019-01-27 | End: 2019-02-27 | Stop reason: CLARIF

## 2019-01-27 RX ORDER — DOXYCYCLINE 100 MG/1
100 CAPSULE ORAL 2 TIMES DAILY
Qty: 20 CAP | Refills: 0 | Status: SHIPPED | OUTPATIENT
Start: 2019-01-27 | End: 2019-11-25

## 2019-01-27 RX ORDER — FLUTICASONE PROPIONATE 50 MCG
2 SPRAY, SUSPENSION (ML) NASAL DAILY
Qty: 1 BOTTLE | Refills: 0 | Status: SHIPPED | OUTPATIENT
Start: 2019-01-27 | End: 2019-02-27 | Stop reason: CLARIF

## 2019-01-27 NOTE — PROGRESS NOTES
Cold Symptoms   The history is provided by the patient. This is a new problem. The current episode started more than 1 week ago. The problem occurs constantly. The problem has been gradually worsening. The cough is productive of sputum. There has been no fever. Associated symptoms include chest pain, ear congestion, sore throat, shortness of breath and wheezing. She has tried cough syrup and decongestants for the symptoms. The treatment provided mild relief. She is a smoker. Her past medical history is significant for bronchitis and COPD.         Past Medical History:   Diagnosis Date    Allergic rhinitis     COPD (chronic obstructive pulmonary disease) (HCC)     mild    Generalized anxiety disorder     Hypothyroidism     Psychiatric disorder         Past Surgical History:   Procedure Laterality Date    HX ORTHOPAEDIC      right wrist tendon    HX OTHER SURGICAL      colonoscopy    HX TUBAL LIGATION           Family History   Problem Relation Age of Onset    Diabetes Mother     Hypertension Mother     Cancer Mother         uterine--chemo tx    Heart Disease Father         Social History     Socioeconomic History    Marital status:      Spouse name: Not on file    Number of children: Not on file    Years of education: Not on file    Highest education level: Not on file   Social Needs    Financial resource strain: Not on file    Food insecurity - worry: Not on file    Food insecurity - inability: Not on file   [employer] needs - medical: Not on file   [employer] needs - non-medical: Not on file   Occupational History    Not on file   Tobacco Use    Smoking status: Current Every Day Smoker     Packs/day: 1.50     Years: 20.00     Pack years: 30.00     Types: Cigarettes    Smokeless tobacco: Never Used   Substance and Sexual Activity    Alcohol use: No     Alcohol/week: 0.0 oz    Drug use: No    Sexual activity: Yes   Other Topics Concern    Not on file   Social History Narrative    Not on file                ALLERGIES: Hydrocodone    Review of Systems   HENT: Positive for congestion, sinus pressure, sinus pain and sore throat. Respiratory: Positive for shortness of breath and wheezing. Cardiovascular: Positive for chest pain. All other systems reviewed and are negative. Vitals:    19 1119   BP: 140/69   Pulse: 84   Resp: 16   Temp: 98.3 °F (36.8 °C)   SpO2: 97%   Weight: 152 lb (68.9 kg)   Height: 5' 4\" (1.626 m)       Physical Exam   Constitutional: No distress. HENT:   Right Ear: Tympanic membrane and ear canal normal.   Left Ear: Tympanic membrane and ear canal normal.   Nose: Nose normal.   Mouth/Throat: No oropharyngeal exudate, posterior oropharyngeal edema or posterior oropharyngeal erythema. Eyes: Conjunctivae are normal. Right eye exhibits no discharge. Left eye exhibits no discharge. Neck: Neck supple. Pulmonary/Chest: Effort normal. No respiratory distress. She has decreased breath sounds. She has no wheezes. She has rhonchi. She has no rales. Lymphadenopathy:     She has no cervical adenopathy. Skin: No rash noted. Nursing note and vitals reviewed. MDM    Procedures      ICD-10-CM ICD-9-CM    1. Acute bronchitis, unspecified organism J20.9 466.0      Medications Ordered Today   Medications    predniSONE (DELTASONE) 20 mg tablet     Sig: 3 tab once x 2 day, 2 tab once x 2 d and 1 tab once x 2 days     Dispense:  12 Tab     Refill:  0    doxycycline (MONODOX) 100 mg capsule     Sig: Take 1 Cap by mouth two (2) times a day. Dispense:  20 Cap     Refill:  0    fluticasone (FLONASE) 50 mcg/actuation nasal spray     Si Sprays by Both Nostrils route daily. Dispense:  1 Bottle     Refill:  0    benzonatate (TESSALON) 200 mg capsule     Sig: Take 1 Cap by mouth three (3) times daily as needed for Cough for up to 7 days. Dispense:  21 Cap     Refill:  0     No results found for any visits on 19.   The patients condition was discussed with the patient and they understand. The patient is to follow up with primary care doctor. If signs and symptoms become worse the pt is to go to the ER. The patient is to take medications as prescribed.

## 2019-01-27 NOTE — PATIENT INSTRUCTIONS
Saline Nasal Washes: Care Instructions  Your Care Instructions  Saline nasal washes help keep the nasal passages open by washing out thick or dried mucus. This simple remedy can help relieve symptoms of allergies, sinusitis, and colds. It also can make the nose feel more comfortable by keeping the mucous membranes moist. You may notice a little burning sensation in your nose the first few times you use the solution, but this usually gets better in a few days. Follow-up care is a key part of your treatment and safety. Be sure to make and go to all appointments, and call your doctor if you are having problems. It's also a good idea to know your test results and keep a list of the medicines you take. How can you care for yourself at home? · You can buy premixed saline solution in a squeeze bottle or other sinus rinse products at a drugstore. Read and follow the instructions on the label. · You also can make your own saline solution by adding 1 teaspoon of salt and 1 teaspoon of baking soda to 2 cups of distilled water. · If you use a homemade solution, pour a small amount into a clean bowl. Using a rubber bulb syringe, squeeze the syringe and place the tip in the salt water. Pull a small amount of the salt water into the syringe by relaxing your hand. · Sit down with your head tilted slightly back. Do not lie down. Put the tip of the bulb syringe or the squeeze bottle a little way into one of your nostrils. Gently drip or squirt a few drops into the nostril. Repeat with the other nostril. Some sneezing and gagging are normal at first.  · Gently blow your nose. · Wipe the syringe or bottle tip clean after each use. · Repeat this 2 or 3 times a day. · Use nasal washes gently if you have nosebleeds often. When should you call for help? Watch closely for changes in your health, and be sure to contact your doctor if:    · You often get nosebleeds.     · You have problems doing the nasal washes.    Where can you learn more? Go to http://steffi-magdalena.info/. Enter [id number] [id number] [id number] in the search box to learn more about \"Saline Nasal Washes: Care Instructions. \"  Current as of: March 27, 2018  Content Version: 11.9  © 8916-4762 [employer]. Care instructions adapted under license by [employer] (which disclaims liability or warranty for this information). If you have questions about a medical condition or this instruction, always ask your healthcare professional. [address] any warranty or liability for your use of this information. Bronchitis: Care Instructions  Your Care Instructions    Bronchitis is inflammation of the bronchial tubes, which carry air to the lungs. The tubes swell and produce mucus, or phlegm. The mucus and inflamed bronchial tubes make you cough. You may have trouble breathing. Most cases of bronchitis are caused by viruses like those that cause colds. Antibiotics usually do not help and they may be harmful. Bronchitis usually develops rapidly and lasts about 2 to 3 weeks in otherwise healthy people. Follow-up care is a key part of your treatment and safety. Be sure to make and go to all appointments, and call your doctor if you are having problems. It's also a good idea to know your test results and keep a list of the medicines you take. How can you care for yourself at home? · Take all medicines exactly as prescribed. Call your doctor if you think you are having a problem with your medicine. · Get some extra rest.  · Take an over-the-counter pain medicine, such as acetaminophen (Tylenol), ibuprofen (Advil, Motrin), or naproxen (Aleve) to reduce fever and relieve body aches. Read and follow all instructions on the label. · Do not take two or more pain medicines at the same time unless the doctor told you to. Many pain medicines have acetaminophen, which is Tylenol. Too much acetaminophen (Tylenol) can be harmful.   · Take an over-the-counter cough medicine that contains dextromethorphan to help quiet a dry, hacking cough so that you can sleep. Avoid cough medicines that have more than one active ingredient. Read and follow all instructions on the label. · Breathe moist air from a humidifier, hot shower, or sink filled with hot water. The heat and moisture will thin mucus so you can cough it out. · Do not smoke. Smoking can make bronchitis worse. If you need help quitting, talk to your doctor about stop-smoking programs and medicines. These can increase your chances of quitting for good. When should you call for help? Call 911 anytime you think you may need emergency care. For example, call if:    · You have severe trouble breathing.    Call your doctor now or seek immediate medical care if:    · You have new or worse trouble breathing.     · You cough up dark brown or bloody mucus (sputum).     · You have a new or higher fever.     · You have a new rash.    Watch closely for changes in your health, and be sure to contact your doctor if:    · You cough more deeply or more often, especially if you notice more mucus or a change in the color of your mucus.     · You are not getting better as expected. Where can you learn more? Go to http://steffi-magdalena.info/. Enter H333 in the search box to learn more about \"Bronchitis: Care Instructions. \"  Current as of: September 5, 2018  Content Version: 11.9  © 4488-1356 [employer]. Care instructions adapted under license by [employer] (which disclaims liability or warranty for this information). If you have questions about a medical condition or this instruction, always ask your healthcare professional. [place] [address] any warranty or liability for your use of this information.

## 2019-02-27 ENCOUNTER — OFFICE VISIT (OUTPATIENT)
Dept: URGENT CARE | Age: 49
End: 2019-02-27

## 2019-02-27 VITALS
RESPIRATION RATE: 18 BRPM | BODY MASS INDEX: 26.8 KG/M2 | HEART RATE: 81 BPM | OXYGEN SATURATION: 98 % | WEIGHT: 157 LBS | SYSTOLIC BLOOD PRESSURE: 141 MMHG | DIASTOLIC BLOOD PRESSURE: 76 MMHG | HEIGHT: 64 IN | TEMPERATURE: 97.1 F

## 2019-02-27 DIAGNOSIS — J06.9 VIRAL UPPER RESPIRATORY TRACT INFECTION: Primary | ICD-10-CM

## 2019-02-27 RX ORDER — BENZONATATE 200 MG/1
200 CAPSULE ORAL
Qty: 21 CAP | Refills: 0 | Status: SHIPPED | OUTPATIENT
Start: 2019-02-27 | End: 2019-03-06

## 2019-02-27 RX ORDER — PROMETHAZINE HYDROCHLORIDE AND DEXTROMETHORPHAN HYDROBROMIDE 6.25; 15 MG/5ML; MG/5ML
5 SYRUP ORAL
Qty: 60 ML | Refills: 0 | Status: SHIPPED | OUTPATIENT
Start: 2019-02-27 | End: 2019-11-25

## 2019-02-27 RX ORDER — FLUTICASONE PROPIONATE 50 MCG
2 SPRAY, SUSPENSION (ML) NASAL DAILY
Qty: 1 BOTTLE | Refills: 0 | Status: SHIPPED | OUTPATIENT
Start: 2019-02-27

## 2019-02-27 RX ORDER — PREDNISONE 10 MG/1
TABLET ORAL
Qty: 21 TAB | Refills: 0 | Status: SHIPPED | OUTPATIENT
Start: 2019-02-27 | End: 2019-11-25

## 2019-02-27 NOTE — PATIENT INSTRUCTIONS
Saline Nasal Washes: Care Instructions  Your Care Instructions  Saline nasal washes help keep the nasal passages open by washing out thick or dried mucus. This simple remedy can help relieve symptoms of allergies, sinusitis, and colds. It also can make the nose feel more comfortable by keeping the mucous membranes moist. You may notice a little burning sensation in your nose the first few times you use the solution, but this usually gets better in a few days. Follow-up care is a key part of your treatment and safety. Be sure to make and go to all appointments, and call your doctor if you are having problems. It's also a good idea to know your test results and keep a list of the medicines you take. How can you care for yourself at home? · You can buy premixed saline solution in a squeeze bottle or other sinus rinse products at a drugstore. Read and follow the instructions on the label. · You also can make your own saline solution by adding 1 teaspoon of salt and 1 teaspoon of baking soda to 2 cups of distilled water. · If you use a homemade solution, pour a small amount into a clean bowl. Using a rubber bulb syringe, squeeze the syringe and place the tip in the salt water. Pull a small amount of the salt water into the syringe by relaxing your hand. · Sit down with your head tilted slightly back. Do not lie down. Put the tip of the bulb syringe or the squeeze bottle a little way into one of your nostrils. Gently drip or squirt a few drops into the nostril. Repeat with the other nostril. Some sneezing and gagging are normal at first.  · Gently blow your nose. · Wipe the syringe or bottle tip clean after each use. · Repeat this 2 or 3 times a day. · Use nasal washes gently if you have nosebleeds often. When should you call for help? Watch closely for changes in your health, and be sure to contact your doctor if:    · You often get nosebleeds.     · You have problems doing the nasal washes.    Where can you learn more? Go to http://steffi-magdalena.info/. Enter [id number] in the search box to learn more about \"Saline Nasal Washes: Care Instructions. \"  Current as of: March 27, 2018  Content Version: 11.9  © 1574-6337 [employer]. Care instructions adapted under license by [employer] (which disclaims liability or warranty for this information). If you have questions about a medical condition or this instruction, always ask your healthcare professional. [address] any warranty or liability for your use of this information.

## 2019-02-28 NOTE — PROGRESS NOTES
Sinus Pain   This is a new problem. The current episode started more than 2 days ago. The problem occurs constantly. The problem has not changed since onset. Associated symptoms include headaches and shortness of breath. Nothing aggravates the symptoms. Nothing relieves the symptoms. She has tried nothing for the symptoms. Past Medical History:   Diagnosis Date    Allergic rhinitis     COPD (chronic obstructive pulmonary disease) (HCC)     mild    Generalized anxiety disorder     Hypothyroidism     Psychiatric disorder         Past Surgical History:   Procedure Laterality Date    HX ORTHOPAEDIC      right wrist tendon    HX OTHER SURGICAL      colonoscopy    HX TUBAL LIGATION           Family History   Problem Relation Age of Onset    Diabetes Mother     Hypertension Mother     Cancer Mother         uterine--chemo tx    Heart Disease Father         Social History     Socioeconomic History    Marital status:      Spouse name: Not on file    Number of children: Not on file    Years of education: Not on file    Highest education level: Not on file   Social Needs    Financial resource strain: Not on file    Food insecurity - worry: Not on file    Food insecurity - inability: Not on file   [employer] needs - medical: Not on file   [employer] needs - non-medical: Not on file   Occupational History    Not on file   Tobacco Use    Smoking status: Current Every Day Smoker     Packs/day: 1.50     Years: 20.00     Pack years: 30.00     Types: Cigarettes    Smokeless tobacco: Never Used   Substance and Sexual Activity    Alcohol use: No     Alcohol/week: 0.0 oz    Drug use: No    Sexual activity: Yes   Other Topics Concern    Not on file   Social History Narrative    Not on file                ALLERGIES: Hydrocodone    Review of Systems   Constitutional: Negative for fatigue and fever. HENT: Positive for congestion, ear pain, sinus pressure and sinus pain.     Respiratory: Positive for cough and shortness of breath. H/o COPD    Neurological: Positive for headaches. Vitals:    19 1829   BP: 141/76   Pulse: 81   Resp: 18   Temp: 97.1 °F (36.2 °C)   SpO2: 98%   Weight: 157 lb (71.2 kg)   Height: 5' 4\" (1.626 m)       Physical Exam   Constitutional: No distress. HENT:   Right Ear: Tympanic membrane and ear canal normal.   Left Ear: Tympanic membrane and ear canal normal.   Nose: Nose normal.   Mouth/Throat: No oropharyngeal exudate, posterior oropharyngeal edema or posterior oropharyngeal erythema. Eyes: Conjunctivae are normal. Right eye exhibits no discharge. Left eye exhibits no discharge. Neck: Neck supple. Pulmonary/Chest: Effort normal. No respiratory distress. She has decreased breath sounds. She has no wheezes. She has rhonchi. She has no rales. Lymphadenopathy:     She has no cervical adenopathy. Skin: No rash noted. Nursing note and vitals reviewed. MDM    Procedures        ICD-10-CM ICD-9-CM    1. Viral upper respiratory tract infection J06.9 465.9      Medications Ordered Today   Medications    benzonatate (TESSALON) 200 mg capsule     Sig: Take 1 Cap by mouth three (3) times daily as needed for Cough for up to 7 days. Dispense:  21 Cap     Refill:  0    predniSONE (STERAPRED DS) 10 mg dose pack     Sig: As directed     Dispense:  21 Tab     Refill:  0    promethazine-dextromethorphan (PROMETHAZINE-DM) 6.25-15 mg/5 mL syrup     Sig: Take 5 mL by mouth nightly as needed for Cough. Dispense:  60 mL     Refill:  0    fluticasone (FLONASE) 50 mcg/actuation nasal spray     Si Sprays by Both Nostrils route daily. Dispense:  1 Bottle     Refill:  0     No results found for any visits on 19. The patients condition was discussed with the patient and they understand. The patient is to follow up with primary care doctor. If signs and symptoms become worse the pt is to go to the ER.  The patient is to take medications as prescribed.

## 2019-03-30 RX ORDER — LEVOTHYROXINE SODIUM 150 UG/1
TABLET ORAL
Qty: 90 TAB | Refills: 1 | Status: SHIPPED | OUTPATIENT
Start: 2019-03-30 | End: 2019-11-25 | Stop reason: SDUPTHER

## 2019-03-30 RX ORDER — PAROXETINE HYDROCHLORIDE 40 MG/1
TABLET, FILM COATED ORAL
Qty: 90 TAB | Refills: 1 | Status: SHIPPED | OUTPATIENT
Start: 2019-03-30 | End: 2019-11-25 | Stop reason: SDUPTHER

## 2019-08-05 DIAGNOSIS — M79.2 NEUROPATHIC PAIN: Primary | ICD-10-CM

## 2019-08-05 RX ORDER — GABAPENTIN 300 MG/1
CAPSULE ORAL
Qty: 90 CAP | Refills: 0 | OUTPATIENT
Start: 2019-08-05 | End: 2019-09-03 | Stop reason: SDUPTHER

## 2019-08-07 ENCOUNTER — TELEPHONE (OUTPATIENT)
Dept: INTERNAL MEDICINE CLINIC | Facility: CLINIC | Age: 49
End: 2019-08-07

## 2019-08-07 NOTE — TELEPHONE ENCOUNTER
Patient calling stating that the pharmacy did not rec'v the refill auth for her Gabapentin.   Please resend and an appt was scheduled for her on 11/25/19

## 2019-09-03 DIAGNOSIS — M79.2 NEUROPATHIC PAIN: ICD-10-CM

## 2019-09-03 RX ORDER — GABAPENTIN 300 MG/1
CAPSULE ORAL
Qty: 90 CAP | Refills: 2 | OUTPATIENT
Start: 2019-09-03 | End: 2019-11-25 | Stop reason: SDUPTHER

## 2019-11-25 ENCOUNTER — OFFICE VISIT (OUTPATIENT)
Dept: INTERNAL MEDICINE CLINIC | Facility: CLINIC | Age: 49
End: 2019-11-25

## 2019-11-25 VITALS
HEIGHT: 64 IN | SYSTOLIC BLOOD PRESSURE: 119 MMHG | TEMPERATURE: 97.6 F | WEIGHT: 153 LBS | OXYGEN SATURATION: 98 % | HEART RATE: 84 BPM | DIASTOLIC BLOOD PRESSURE: 80 MMHG | BODY MASS INDEX: 26.12 KG/M2 | RESPIRATION RATE: 16 BRPM

## 2019-11-25 DIAGNOSIS — Z23 ENCOUNTER FOR IMMUNIZATION: ICD-10-CM

## 2019-11-25 DIAGNOSIS — Z72.0 TOBACCO USE: ICD-10-CM

## 2019-11-25 DIAGNOSIS — E03.9 ACQUIRED HYPOTHYROIDISM: Primary | ICD-10-CM

## 2019-11-25 DIAGNOSIS — F41.9 ANXIETY: ICD-10-CM

## 2019-11-25 DIAGNOSIS — H02.63 XANTHELASMA OF RIGHT EYELID: ICD-10-CM

## 2019-11-25 DIAGNOSIS — R29.898 LEFT HAND WEAKNESS: ICD-10-CM

## 2019-11-25 DIAGNOSIS — J44.9 CHRONIC OBSTRUCTIVE PULMONARY DISEASE, UNSPECIFIED COPD TYPE (HCC): ICD-10-CM

## 2019-11-25 DIAGNOSIS — M79.2 NEUROPATHIC PAIN: ICD-10-CM

## 2019-11-25 RX ORDER — PAROXETINE HYDROCHLORIDE 40 MG/1
TABLET, FILM COATED ORAL
Qty: 90 TAB | Refills: 1 | Status: SHIPPED | OUTPATIENT
Start: 2019-11-25 | End: 2020-11-17 | Stop reason: SDUPTHER

## 2019-11-25 RX ORDER — GABAPENTIN 300 MG/1
CAPSULE ORAL
Qty: 90 CAP | Refills: 3 | Status: SHIPPED | OUTPATIENT
Start: 2019-11-25 | End: 2020-11-17 | Stop reason: SDUPTHER

## 2019-11-25 RX ORDER — LEVOTHYROXINE SODIUM 150 UG/1
TABLET ORAL
Qty: 90 TAB | Refills: 0 | Status: SHIPPED | OUTPATIENT
Start: 2019-11-25 | End: 2020-09-09 | Stop reason: SDUPTHER

## 2019-11-25 NOTE — PATIENT INSTRUCTIONS
Vaccine Information Statement Influenza (Flu) Vaccine (Inactivated or Recombinant): What You Need to Know Many Vaccine Information Statements are available in Syriac and other languages. See www.immunize.org/vis Hojas de información sobre vacunas están disponibles en español y en muchos otros idiomas. Visite www.immunize.org/vis 1. Why get vaccinated? Influenza vaccine can prevent influenza (flu). Flu is a contagious disease that spreads around the [facility] every year, usually between October and May. Anyone can get the flu, but it is more dangerous for some people. Infants and young children, people 72years of age and older, pregnant women, and people with certain health conditions or a weakened immune system are at greatest risk of flu complications. Pneumonia, bronchitis, sinus infections and ear infections are examples of flu-related complications. If you have a medical condition, such as heart disease, cancer or diabetes, flu can make it worse. Flu can cause fever and chills, sore throat, muscle aches, fatigue, cough, headache, and runny or stuffy nose. Some people may have vomiting and diarrhea, though this is more common in children than adults. Each year thousands of people in the [facility] die from flu, and many more are hospitalized. Flu vaccine prevents millions of illnesses and flu-related visits to the doctor each year. 2. Influenza vaccines CDC recommends everyone 10months of age and older get vaccinated every flu season. Children 6 months through 6years of age may need 2 doses during a single flu season. Everyone else needs only 1 dose each flu season. It takes about 2 weeks for protection to develop after vaccination. There are many flu viruses, and they are always changing. Each year a new flu vaccine is made to protect against three or four viruses that are likely to cause disease in the upcoming flu season.  Even when the vaccine doesnt exactly match these viruses, it may still provide some protection. Influenza vaccine does not cause flu. Influenza vaccine may be given at the same time as other vaccines. 3. Talk with your health care provider Tell your vaccine provider if the person getting the vaccine: 
 Has had an allergic reaction after a previous dose of influenza vaccine, or has any severe, life-threatening allergies.  Has ever had Guillain-Barré Syndrome (also called GBS). In some cases, your health care provider may decide to postpone influenza vaccination to a future visit. People with minor illnesses, such as a cold, may be vaccinated. People who are moderately or severely ill should usually wait until they recover before getting influenza vaccine. Your health care provider can give you more information. 4. Risks of a reaction  Soreness, redness, and swelling where shot is given, fever, muscle aches, and headache can happen after influenza vaccine.  There may be a very small increased risk of Guillain-Barré Syndrome (GBS) after inactivated influenza vaccine (the flu shot). [facility] children who get the flu shot along with pneumococcal vaccine (PCV13), and/or DTaP vaccine at the same time might be slightly more likely to have a seizure caused by fever. Tell your health care provider if a child who is getting flu vaccine has ever had a seizure. People sometimes faint after medical procedures, including vaccination. Tell your provider if you feel dizzy or have vision changes or ringing in the ears. As with any medicine, there is a very remote chance of a vaccine causing a severe allergic reaction, other serious injury, or death. 5. What if there is a serious problem? An allergic reaction could occur after the vaccinated person leaves the clinic.  If you see signs of a severe allergic reaction (hives, swelling of the face and throat, difficulty breathing, a fast heartbeat, dizziness, or weakness), call 9-1-1 and get the person to the nearest hospital. 
 
For other signs that concern you, call your health care provider. Adverse reactions should be reported to the Vaccine Adverse Event Reporting System (VAERS). Your health care provider will usually file this report, or you can do it yourself. Visit the VAERS website at www.vaers. hhs.gov or call 1-391.528.4965. VAERS is only for reporting reactions, and VAERS staff do not give medical advice. 6. The National Vaccine Injury Compensation Program 
 
The [facility] Vaccine Injury Compensation Program (VICP) is a federal program that was created to compensate people who may have been injured by certain vaccines. Visit the VICP website at www.hrsa.gov/vaccinecompensation or call 1-298.843.9840 to learn about the program and about filing a claim. There is a time limit to file a claim for compensation. 7. How can I learn more?  Ask your health care provider.  Call your local or state health department.  Contact the Centers for Disease Control and Prevention (CDC): 
- Call 3-410.508.7048 (4-961-SDE-INFO) or 
- Visit CDCs influenza website at www.cdc.gov/flu Vaccine Information Statement (Interim) Inactivated Influenza Vaccine 8/15/2019 
42 PAT [address] [id number]-[id number] Department of Health and [employer] Centers for Disease Control and Prevention Office Use Only

## 2019-11-25 NOTE — PROGRESS NOTES
Enedina Crisostomo  Identified pt with two pt identifiers(name and ). Chief Complaint   Patient presents with    Thyroid Problem    Medication Evaluation       1. Have you been to the ER, urgent care clinic since your last visit? Hospitalized since your last visit? Urgent care  and     2. Have you seen or consulted any other health care providers outside of the [address] [place] [address] since your last visit? Include any pap smears or colon screening. NO      Provider notified of reason for visit, vitals and flowsheets obtained on patients.      Patient received paperwork for advance directive during previous visit but has not completed at this time     Reviewed record In preparation for visit, huddled with provider and have obtained necessary documentation      Health Maintenance Due   Topic    Pneumococcal 0-64 years (1 of 1 - PPSV23)    PAP AKA CERVICAL CYTOLOGY     DTaP/Tdap/Td series (2 - Td)    Influenza Age 5 to Adult        Wt Readings from Last 3 Encounters:   19 153 lb (69.4 kg)   19 157 lb (71.2 kg)   19 152 lb (68.9 kg)     Temp Readings from Last 3 Encounters:   19 97.1 °F (36.2 °C)   19 98.3 °F (36.8 °C)   10/29/18 98.1 °F (36.7 °C) (Oral)     BP Readings from Last 3 Encounters:   19 141/76   19 140/69   10/29/18 106/65     Pulse Readings from Last 3 Encounters:   19 81   19 84   10/29/18 75     Vitals:    19 1408   Resp: 16   SpO2: 98%   Weight: 153 lb (69.4 kg)   Height: 5' 4\" (1.626 m)   PainSc:   0 - No pain   LMP: 2019         Learning Assessment:  :     Learning Assessment 2014   PRIMARY LEARNER Patient   HIGHEST LEVEL OF EDUCATION - PRIMARY LEARNER  DID NOT GRADUATE HIGH SCHOOL   BARRIERS PRIMARY LEARNER NONE   PRIMARY LANGUAGE ENGLISH    NEED No   LEARNER PREFERENCE PRIMARY DEMONSTRATION   ANSWERED BY patient   RELATIONSHIP SELF       Depression Screening:  :     3 most recent PHQ Screens 11/25/2019   Little interest or pleasure in doing things Not at all   Feeling down, depressed, irritable, or hopeless Not at all   Total Score PHQ 2 0   Trouble falling or staying asleep, or sleeping too much Not at all   Feeling tired or having little energy Not at all   Poor appetite, weight loss, or overeating Not at all   Feeling bad about yourself - or that you are a failure or have let yourself or your family down Not at all   Trouble concentrating on things such as school, work, reading, or watching TV Not at all   Moving or speaking so slowly that other people could have noticed; or the opposite being so fidgety that others notice Not at all   Thoughts of being better off dead, or hurting yourself in some way Not at all   PHQ 9 Score 0   How difficult have these problems made it for you to do your work, take care of your home and get along with others Not difficult at all           Abuse Screening:  :     Abuse Screening Questionnaire 10/29/2018   Do you ever feel afraid of your partner? N   Are you in a relationship with someone who physically or mentally threatens you? N   Is it safe for you to go home? Y       ADL Screening:  :     ADL Assessment 3/17/2015   Feeding yourself No Help Needed   Getting from bed to chair No Help Needed   Getting dressed No Help Needed   Bathing or showering No Help Needed   Walk across the room (includes cane/walker) No Help Needed   Using the telphone No Help Needed   Taking your medications No Help Needed   Preparing meals No Help Needed   Managing money (expenses/bills) No Help Needed   Moderately strenuous housework (laundry) No Help Needed   Shopping for personal items (toiletries/medicines) No Help Needed   Shopping for groceries No Help Needed   Driving No Help Needed   Climbing a flight of stairs No Help Needed   Getting to places beyond walking distances No Help Needed         Medication reconciliation up to date and corrected with patient at this time.

## 2019-11-25 NOTE — PROGRESS NOTES
HPI  Ms. Spike Sloan is a 52y.o. year old female, she is seen today for follow up hypothyroidism, neuropathic pain, anxiety. Taking levothyroxine daily - no missed doses. Takes 150mcg every night. No unusual anxiety, palpitations, sweats, fatigue. Does not hot flashes about a week before her period, still having regular periods. Neuropathic pain in left arm is stable - better with gabapentin tid. Has gradually worsening weakness left arm. Hasn't made appt with nsgy yet. Some worsening sob and cough this time of year. Not much wheezing. Has symbicort but uses it only one puff about every other day. Notes she uses it in the morning due to feeling more out of breath. Anxiety is well controlled with paxil. Chief Complaint   Patient presents with    Thyroid Problem    Medication Evaluation        Prior to Admission medications    Medication Sig Start Date End Date Taking? Authorizing Provider   gabapentin (NEURONTIN) 300 mg capsule TAKE 1 CAPSULE BY MOUTH THREE TIMES A DAY 9/3/19  Yes Romana Bailey MD   PARoxetine (PAXIL) 40 mg tablet TAKE 1 TABLET BY MOUTH EVERY DAY 3/30/19  Yes Romana Bailey MD   levothyroxine (SYNTHROID) 150 mcg tablet TAKE 1 TABLET BY MOUTH EVERY DAY BEFORE BREAKFAST 3/30/19  Yes Romana Bailey MD   fluticasone (FLONASE) 50 mcg/actuation nasal spray 2 Sprays by Both Nostrils route daily. 2/27/19  Yes Nash Jordan MD   budesonide-formoterol [facility]) 160-4.5 mcg/actuation HFAA Take 2 Puffs by inhalation two (2) times a day. 7/2/18  Yes Romana Bailey MD   albuterol-ipratropium (DUO-NEB) 2.5 mg-0.5 mg/3 ml nebu 3 mL by Nebulization route every four (4) hours as needed. 6/14/17  Yes Shagufta Church MD   albuterol (PROVENTIL HFA, VENTOLIN HFA, PROAIR HFA) 90 mcg/actuation inhaler Take 1 Puff by inhalation every four (4) hours as needed for Wheezing. 2/6/17  Yes Guanaco Shaffer MD   ibuprofen (MOTRIN IB) 200 mg tablet Take 200 mg by mouth as needed. Yes Provider, Historical   acetaminophen (TYLENOL) 325 mg tablet Take  by mouth every four (4) hours as needed for Pain. Yes Provider, Historical   loratadine (CLARITIN) 10 mg tablet Take 10 mg by mouth as needed. Yes Provider, Historical   predniSONE (STERAPRED DS) 10 mg dose pack As directed 2/27/19   Manuel Guzman MD   promethazine-dextromethorphan (PROMETHAZINE-DM) 6.25-15 mg/5 mL syrup Take 5 mL by mouth nightly as needed for Cough. 2/27/19   Manuel Guzman MD   doxycycline (MONODOX) 100 mg capsule Take 1 Cap by mouth two (2) times a day. 1/27/19   Manuel Guzman MD         Allergies   Allergen Reactions    Hydrocodone Hives         REVIEW OF SYSTEMS:  Per HPI    PHYSICAL EXAM:  Visit Vitals  /80 (BP 1 Location: Right arm, BP Patient Position: Sitting)   Pulse 84   Temp 97.6 °F (36.4 °C) (Oral)   Resp 16   Ht 5' 4\" (1.626 m)   Wt 153 lb (69.4 kg)   LMP 11/11/2019   SpO2 98%   BMI 26.26 kg/m²     Constitutional: Appears well-developed and well-nourished. No distress. HENT:   Head: Normocephalic and atraumatic. Eyes: No scleral icterus. Xanthelasma around right eye. Neck: no lad, no tm, supple   Cardiovascular: Normal S1/S2, regular rhythm. No murmurs, rubs, or gallops. Pulmonary/Chest: Effort normal and breath sounds normal. No respiratory distress. No wheezes, rhonchi, or rales. Ext: No edema. Neurological: Alert.  4/5 left hand finger 3-5, otherwise strength 5/5 b/l UE. Psychiatric: Normal mood and affect.  Behavior is normal.     Lab Results   Component Value Date/Time    Sodium 141 07/02/2018 10:13 AM    Potassium 4.4 07/02/2018 10:13 AM    Chloride 102 07/02/2018 10:13 AM    CO2 21 07/02/2018 10:13 AM    Anion gap 9 05/02/2017 11:30 AM    Glucose 87 07/02/2018 10:13 AM    BUN 9 07/02/2018 10:13 AM    Creatinine 0.54 (L) 07/02/2018 10:13 AM    BUN/Creatinine ratio 17 07/02/2018 10:13 AM    GFR est  07/02/2018 10:13 AM    GFR est non- 07/02/2018 10:13 AM    Calcium 9.1 07/02/2018 10:13 AM    Bilirubin, total 0.3 07/02/2018 10:13 AM    AST (SGOT) 15 07/02/2018 10:13 AM    Alk. phosphatase 81 07/02/2018 10:13 AM    Protein, total 6.6 07/02/2018 10:13 AM    Albumin 4.4 07/02/2018 10:13 AM    Globulin 3.3 05/02/2017 11:30 AM    A-G Ratio 2.0 07/02/2018 10:13 AM    ALT (SGPT) 8 07/02/2018 10:13 AM     Lab Results   Component Value Date/Time    Hemoglobin A1c 5.5 05/02/2017 11:30 AM      Lab Results   Component Value Date/Time    Cholesterol, total 181 07/02/2018 10:13 AM    HDL Cholesterol 39 (L) 07/02/2018 10:13 AM    LDL, calculated 115 (H) 07/02/2018 10:13 AM    VLDL, calculated 27 07/02/2018 10:13 AM    Triglyceride 133 07/02/2018 10:13 AM          ASSESSMENT/PLAN  Diagnoses and all orders for this visit:    1. Acquired hypothyroidism  -     levothyroxine (SYNTHROID) 150 mcg tablet; TAKE 1 TABLET BY MOUTH EVERY DAY BEFORE BREAKFAST  -     METABOLIC PANEL, COMPREHENSIVE  -     TSH RFX ON ABNORMAL TO FREE T4  Clinically euthyroid  2. Neuropathic pain  -     gabapentin (NEURONTIN) 300 mg capsule; TAKE 1 CAPSULE BY MOUTH THREE TIMES A DAY  Stable pain but weakness worsening - once again advised seeing nsgy and emphasized weakness could be progressive and permanent  3. Encounter for immunization  -     INFLUENZA VIRUS VAC QUAD,SPLIT,PRESV FREE SYRINGE IM  -     PA IMMUNIZ ADMIN,1 SINGLE/COMB VAC/TOXOID    4. Chronic obstructive pulmonary disease, unspecified COPD type ([facility] [address]  Advised using symbicort at least 1 puff bid - encouraged smoking cessation   5. Xanthelasma of right eyelid  Check lipids in future - not fasting  6. Tobacco use    7. Left hand weakness  See #2  8.  Anxiety  -     PARoxetine (PAXIL) 40 mg tablet; TAKE 1 TABLET BY MOUTH EVERY DAY  Controlled on current regimen, continue         Health Maintenance Due   Topic Date Due    Pneumococcal 0-64 years (1 of 1 - PPSV23) 05/05/2010    PAP AKA CERVICAL CYTOLOGY  05/18/2018    DTaP/Tdap/Td series (2 - Td) 08/01/2018    Influenza Age 5 to Adult  08/01/2019               Reviewed plan of care. Patient has provided input and agrees with goals. The nurse provided the patient and/or family with advanced directive information if needed and encouraged the patient to provide a copy to the office when available.

## 2019-11-26 LAB
ALBUMIN SERPL-MCNC: 4.5 G/DL (ref 3.5–5.5)
ALBUMIN/GLOB SERPL: 2 {RATIO} (ref 1.2–2.2)
ALP SERPL-CCNC: 86 IU/L (ref 39–117)
ALT SERPL-CCNC: 8 IU/L (ref 0–32)
AST SERPL-CCNC: 14 IU/L (ref 0–40)
BILIRUB SERPL-MCNC: 0.3 MG/DL (ref 0–1.2)
BUN SERPL-MCNC: 9 MG/DL (ref 6–24)
BUN/CREAT SERPL: 15 (ref 9–23)
CALCIUM SERPL-MCNC: 8.9 MG/DL (ref 8.7–10.2)
CHLORIDE SERPL-SCNC: 102 MMOL/L (ref 96–106)
CO2 SERPL-SCNC: 25 MMOL/L (ref 20–29)
CREAT SERPL-MCNC: 0.62 MG/DL (ref 0.57–1)
GLOBULIN SER CALC-MCNC: 2.2 G/DL (ref 1.5–4.5)
GLUCOSE SERPL-MCNC: 74 MG/DL (ref 65–99)
POTASSIUM SERPL-SCNC: 4 MMOL/L (ref 3.5–5.2)
PROT SERPL-MCNC: 6.7 G/DL (ref 6–8.5)
SODIUM SERPL-SCNC: 142 MMOL/L (ref 134–144)
T4 FREE SERPL-MCNC: 1.24 NG/DL (ref 0.82–1.77)
TSH SERPL DL<=0.005 MIU/L-ACNC: 4.91 UIU/ML (ref 0.45–4.5)

## 2020-01-06 ENCOUNTER — OFFICE VISIT (OUTPATIENT)
Dept: URGENT CARE | Age: 50
End: 2020-01-06

## 2020-01-06 VITALS
DIASTOLIC BLOOD PRESSURE: 67 MMHG | RESPIRATION RATE: 16 BRPM | HEIGHT: 64 IN | SYSTOLIC BLOOD PRESSURE: 129 MMHG | HEART RATE: 65 BPM | TEMPERATURE: 98.4 F | BODY MASS INDEX: 25.95 KG/M2 | WEIGHT: 152 LBS | OXYGEN SATURATION: 98 %

## 2020-01-06 DIAGNOSIS — N39.0 URINARY TRACT INFECTION WITHOUT HEMATURIA, SITE UNSPECIFIED: Primary | ICD-10-CM

## 2020-01-06 LAB
BILIRUB UR QL STRIP: NEGATIVE
GLUCOSE UR-MCNC: NEGATIVE MG/DL
KETONES P FAST UR STRIP-MCNC: NEGATIVE MG/DL
PH UR STRIP: 7 [PH] (ref 4.6–8)
PROT UR QL STRIP: NEGATIVE
SP GR UR STRIP: 1.01 (ref 1–1.03)
UA UROBILINOGEN AMB POC: NORMAL (ref 0.2–1)
URINALYSIS CLARITY POC: NORMAL
URINALYSIS COLOR POC: NORMAL
URINE BLOOD POC: NORMAL
URINE LEUKOCYTES POC: NORMAL
URINE NITRITES POC: NEGATIVE

## 2020-01-06 RX ORDER — SULFAMETHOXAZOLE AND TRIMETHOPRIM 800; 160 MG/1; MG/1
1 TABLET ORAL 2 TIMES DAILY
Qty: 20 TAB | Refills: 0 | Status: SHIPPED | OUTPATIENT
Start: 2020-01-06 | End: 2020-11-17 | Stop reason: ALTCHOICE

## 2020-01-06 RX ORDER — PHENAZOPYRIDINE HYDROCHLORIDE 200 MG/1
200 TABLET, FILM COATED ORAL
Qty: 10 TAB | Refills: 0 | Status: SHIPPED | OUTPATIENT
Start: 2020-01-06 | End: 2020-11-17 | Stop reason: ALTCHOICE

## 2020-01-06 NOTE — PATIENT INSTRUCTIONS
Urinary Tract Infection in Women: Care Instructions  Your Care Instructions    A urinary tract infection, or UTI, is a general term for an infection anywhere between the kidneys and the urethra (where urine comes out). Most UTIs are bladder infections. They often cause pain or burning when you urinate. UTIs are caused by bacteria and can be cured with antibiotics. Be sure to complete your treatment so that the infection goes away. Follow-up care is a key part of your treatment and safety. Be sure to make and go to all appointments, and call your doctor if you are having problems. It's also a good idea to know your test results and keep a list of the medicines you take. How can you care for yourself at home? · Take your antibiotics as directed. Do not stop taking them just because you feel better. You need to take the full course of antibiotics. · Drink extra water and other fluids for the next day or two. This may help wash out the bacteria that are causing the infection. (If you have kidney, heart, or liver disease and have to limit fluids, talk with your doctor before you increase your fluid intake.)  · Avoid drinks that are carbonated or have caffeine. They can irritate the bladder. · Urinate often. Try to empty your bladder each time. · To relieve pain, take a hot bath or lay a heating pad set on low over your lower belly or genital area. Never go to sleep with a heating pad in place. To prevent UTIs  · Drink plenty of water each day. This helps you urinate often, which clears bacteria from your system. (If you have kidney, heart, or liver disease and have to limit fluids, talk with your doctor before you increase your fluid intake.)  · Urinate when you need to. · Urinate right after you have sex. · Change sanitary pads often. · Avoid douches, bubble baths, feminine hygiene sprays, and other feminine hygiene products that have deodorants.   · After going to the bathroom, wipe from front to back.  When should you call for help? Call your doctor now or seek immediate medical care if:    · Symptoms such as fever, chills, nausea, or vomiting get worse or appear for the first time.     · You have new pain in your back just below your rib cage. This is called flank pain.     · There is new blood or pus in your urine.     · You have any problems with your antibiotic medicine.    Watch closely for changes in your health, and be sure to contact your doctor if:    · You are not getting better after taking an antibiotic for 2 days.     · Your symptoms go away but then come back. Where can you learn more? Go to http://steffi-magdalena.info/. Enter P215 in the search box to learn more about \"Urinary Tract Infection in Women: Care Instructions. \"  Current as of: December 19, 2018  Content Version: 12.2  © 3868-7911 [employer]. Care instructions adapted under license by [employer] (which disclaims liability or warranty for this information). If you have questions about a medical condition or this instruction, always ask your healthcare professional. [address] any warranty or liability for your use of this information.

## 2020-01-06 NOTE — PROGRESS NOTES
Urinary Pain   This is a new problem. The current episode started more than 1 week ago. The problem occurs daily (get better for few days with fluids). The problem has not changed since onset. Associated symptoms include abdominal pain (suprapubic). Nothing aggravates the symptoms. Nothing relieves the symptoms. She has tried nothing for the symptoms.         Past Medical History:   Diagnosis Date    Allergic rhinitis     COPD (chronic obstructive pulmonary disease) (HCC)     mild    Generalized anxiety disorder     Hypothyroidism     Psychiatric disorder         Past Surgical History:   Procedure Laterality Date    HX ORTHOPAEDIC      right wrist tendon    HX OTHER SURGICAL      colonoscopy    HX TUBAL LIGATION           Family History   Problem Relation Age of Onset    Diabetes Mother     Hypertension Mother     Cancer Mother         uterine--chemo tx    Heart Disease Father         Social History     Socioeconomic History    Marital status:      Spouse name: Not on file    Number of children: Not on file    Years of education: Not on file    Highest education level: Not on file   Occupational History    Not on file   Social Needs    Financial resource strain: Not on file    Food insecurity:     Worry: Not on file     Inability: Not on file    Transportation needs:     Medical: Not on file     Non-medical: Not on file   Tobacco Use    Smoking status: Current Every Day Smoker     Packs/day: 1.50     Years: 20.00     Pack years: 30.00     Types: Cigarettes    Smokeless tobacco: Never Used    Tobacco comment: 1ppd 11/25/19   Substance and Sexual Activity    Alcohol use: No     Alcohol/week: 0.0 standard drinks    Drug use: No    Sexual activity: Yes   Lifestyle    Physical activity:     Days per week: Not on file     Minutes per session: Not on file    Stress: Not on file   Relationships    Social connections:     Talks on phone: Not on file     Gets together: Not on file Attends [employer] service: Not on file     Active member of club or organization: Not on file     Attends meetings of clubs or organizations: Not on file     Relationship status: Not on file    Intimate partner violence:     Fear of current or ex partner: Not on file     Emotionally abused: Not on file     Physically abused: Not on file     Forced sexual activity: Not on file   Other Topics Concern    Not on file   Social History Narrative    Not on file                ALLERGIES: Hydrocodone    Review of Systems   Constitutional: Negative for chills and fever. Gastrointestinal: Positive for abdominal pain (suprapubic). Genitourinary: Positive for dysuria, frequency and urgency. All other systems reviewed and are negative. Vitals:    01/06/20 1324   BP: 129/67   Pulse: 65   Resp: 16   Temp: 98.4 °F (36.9 °C)   SpO2: 98%   Weight: 152 lb (68.9 kg)   Height: 5' 4\" (1.626 m)       Physical Exam  Vitals signs and nursing note reviewed. Constitutional:       General: She is not in acute distress. Appearance: Normal appearance. Abdominal:      General: Bowel sounds are normal.      Palpations: Abdomen is not rigid. Tenderness: There is tenderness in the suprapubic area. There is no guarding or rebound. MDM    Procedures        ICD-10-CM ICD-9-CM    1. Urinary tract infection without hematuria, site unspecified N39.0 599.0 AMB POC URINALYSIS DIP STICK AUTO W/O MICRO     Medications Ordered Today   Medications    trimethoprim-sulfamethoxazole (BACTRIM DS) 160-800 mg per tablet     Sig: Take 1 Tab by mouth two (2) times a day. Dispense:  20 Tab     Refill:  0    phenazopyridine (PYRIDIUM) 200 mg tablet     Sig: Take 1 Tab by mouth three (3) times daily as needed for Pain.      Dispense:  10 Tab     Refill:  0     Results for orders placed or performed in visit on 01/06/20   AMB POC URINALYSIS DIP STICK AUTO W/O MICRO   Result Value Ref Range    Color (UA POC)      Clarity (UA POC) Glucose (UA POC) Negative Negative    Bilirubin (UA POC) Negative Negative    Ketones (UA POC) Negative Negative    Specific gravity (UA POC) 1.015 1.001 - 1.035    Blood (UA POC) Trace Negative    pH (UA POC) 7.0 4.6 - 8.0    Protein (UA POC) Negative Negative    Urobilinogen (UA POC) 0.2 mg/dL 0.2 - 1    Nitrites (UA POC) Negative Negative    Leukocyte esterase (UA POC) 1+ Negative     The patients condition was discussed with the patient and they understand. The patient is to follow up with primary care doctor. If signs and symptoms become worse the pt is to go to the ER. The patient is to take medications as prescribed.

## 2020-03-03 DIAGNOSIS — J44.1 COPD EXACERBATION (HCC): ICD-10-CM

## 2020-03-03 RX ORDER — ALBUTEROL SULFATE 90 UG/1
1 AEROSOL, METERED RESPIRATORY (INHALATION)
Qty: 1 INHALER | Refills: 2 | Status: SHIPPED | OUTPATIENT
Start: 2020-03-03

## 2020-03-03 RX ORDER — BUDESONIDE AND FORMOTEROL FUMARATE DIHYDRATE 160; 4.5 UG/1; UG/1
2 AEROSOL RESPIRATORY (INHALATION) 2 TIMES DAILY
Qty: 1 INHALER | Refills: 11 | Status: SHIPPED | OUTPATIENT
Start: 2020-03-03 | End: 2021-10-18

## 2020-03-03 RX ORDER — IPRATROPIUM BROMIDE AND ALBUTEROL SULFATE 2.5; .5 MG/3ML; MG/3ML
3 SOLUTION RESPIRATORY (INHALATION)
Qty: 60 NEBULE | Refills: 2 | Status: SHIPPED | OUTPATIENT
Start: 2020-03-03

## 2020-06-29 DIAGNOSIS — M79.2 NEUROPATHIC PAIN: ICD-10-CM

## 2020-06-29 RX ORDER — GABAPENTIN 300 MG/1
CAPSULE ORAL
Qty: 90 CAP | Refills: 3 | OUTPATIENT
Start: 2020-06-29

## 2020-09-09 DIAGNOSIS — E03.9 ACQUIRED HYPOTHYROIDISM: ICD-10-CM

## 2020-09-09 RX ORDER — LEVOTHYROXINE SODIUM 150 UG/1
TABLET ORAL
Qty: 90 TAB | Refills: 0 | Status: SHIPPED | OUTPATIENT
Start: 2020-09-09 | End: 2020-11-17 | Stop reason: SDUPTHER

## 2020-09-09 NOTE — TELEPHONE ENCOUNTER
[facility] on file sent a fax requesting a refill of   Requested Prescriptions     Pending Prescriptions Disp Refills    levothyroxine (SYNTHROID) 150 mcg tablet 90 Tab 0     Sig: TAKE 1 TABLET BY MOUTH EVERY DAY BEFORE BREAKFAST

## 2020-09-09 NOTE — TELEPHONE ENCOUNTER
PCP: José Portillo MD     Last appt: Visit date not found   No future appointments.      Requested Prescriptions     Pending Prescriptions Disp Refills    levothyroxine (SYNTHROID) 150 mcg tablet 90 Tab 0     Sig: TAKE 1 TABLET BY MOUTH EVERY DAY BEFORE BREAKFAST

## 2020-10-20 ENCOUNTER — TELEPHONE (OUTPATIENT)
Dept: INTERNAL MEDICINE CLINIC | Age: 50
End: 2020-10-20

## 2020-10-20 NOTE — TELEPHONE ENCOUNTER
Spoke with patient. She states she has severe back pain. Advised [facility] On [facility] and gave number of [phone] to call to be seen-hopefully today. Patient stated understanding.

## 2020-10-20 NOTE — TELEPHONE ENCOUNTER
Patient called to say she has an appt scheduled in November earliest she could get but her sciatic pain in unbearable she needs to get back to work. She used to take gabapentin for nerve damage and was wondering if she could get something called in until her appt.    [phone]

## 2020-11-17 ENCOUNTER — VIRTUAL VISIT (OUTPATIENT)
Dept: INTERNAL MEDICINE CLINIC | Age: 50
End: 2020-11-17
Payer: COMMERCIAL

## 2020-11-17 DIAGNOSIS — F41.9 ANXIETY: ICD-10-CM

## 2020-11-17 DIAGNOSIS — E03.9 ACQUIRED HYPOTHYROIDISM: ICD-10-CM

## 2020-11-17 DIAGNOSIS — F41.1 GENERALIZED ANXIETY DISORDER: Primary | ICD-10-CM

## 2020-11-17 DIAGNOSIS — R29.898 LEFT HAND WEAKNESS: ICD-10-CM

## 2020-11-17 DIAGNOSIS — M79.2 NEUROPATHIC PAIN: ICD-10-CM

## 2020-11-17 DIAGNOSIS — J44.9 CHRONIC OBSTRUCTIVE PULMONARY DISEASE, UNSPECIFIED COPD TYPE (HCC): ICD-10-CM

## 2020-11-17 DIAGNOSIS — M79.605 LEFT LEG PAIN: ICD-10-CM

## 2020-11-17 DIAGNOSIS — M48.02 SPINAL STENOSIS IN CERVICAL REGION: ICD-10-CM

## 2020-11-17 PROCEDURE — 99214 OFFICE O/P EST MOD 30 MIN: CPT | Performed by: INTERNAL MEDICINE

## 2020-11-17 RX ORDER — GABAPENTIN 300 MG/1
CAPSULE ORAL
Qty: 90 CAP | Refills: 1 | Status: SHIPPED | OUTPATIENT
Start: 2020-11-17 | End: 2021-02-08

## 2020-11-17 RX ORDER — PAROXETINE HYDROCHLORIDE 40 MG/1
TABLET, FILM COATED ORAL
Qty: 90 TAB | Refills: 1 | Status: SHIPPED | OUTPATIENT
Start: 2020-11-17 | End: 2021-04-20 | Stop reason: SDUPTHER

## 2020-11-17 RX ORDER — LEVOTHYROXINE SODIUM 150 UG/1
TABLET ORAL
Qty: 90 TAB | Refills: 0 | Status: SHIPPED | OUTPATIENT
Start: 2020-11-17 | End: 2021-04-02 | Stop reason: SDUPTHER

## 2020-11-17 NOTE — PROGRESS NOTES
Ana Steward is a 48 y.o. female who was seen by synchronous (real-time) audio-video technology on 11/17/2020 for Thyroid Problem (nerve pain)    This visit was completed virtually using doxy. me    Assessment & Plan:   Diagnoses and all orders for this visit:    1. Generalized anxiety disorder    2. Acquired hypothyroidism  -     levothyroxine (SYNTHROID) 150 mcg tablet; TAKE 1 TABLET BY MOUTH EVERY DAY BEFORE BREAKFAST  -     METABOLIC PANEL, COMPREHENSIVE; Future  -     T4, FREE; Future  -     TSH 3RD GENERATION; Future  -     LIPID PANEL; Future    3. Anxiety  -     PARoxetine (PAXIL) 40 mg tablet; TAKE 1 TABLET BY MOUTH EVERY DAY    4. Neuropathic pain  -     gabapentin (NEURONTIN) 300 mg capsule; TAKE 1 CAPSULE BY MOUTH THREE TIMES A DAY    5. Chronic obstructive pulmonary disease, unspecified COPD type ([facility] [address]    6. Spinal stenosis in cervical region  -     REFERRAL TO NEUROSURGERY    7. Left hand weakness  -     REFERRAL TO NEUROSURGERY    8. Left leg pain  -     REFERRAL TO NEUROSURGERY    Is controlled with Paxil daily. Clinically euthyroid but has not had recent thyroid testing and will order that. COPD is stable. Encourage smoking cessation. Neuropathic pain is controlled with gabapentin and will continue. Refer to neurosurgery for cervical spinal stenosis along with left hand weakness left leg pain. Emphasized the importance of making an appointment so that she does not lose motor function. Follow-up and Dispositions    · Return in about 4 months (around 3/17/2021) for copd, anxiety - in person.              Subjective:     Neuropathic pain - on gabapentin for pain in left hand - has been off it for about 3 mos due to being out - pain worse - burning, tingling pain, aching also - better when she takes gabapentin bid - has worsening weakness in left hand - but declines to see surgeon at this time    Anxiety - some days better than others - manageable if take paxil, no SI - more worry, occasionally feeling down - doesn't last long    Hypothyroidism - +dry skin and constipation (chronic x years) - no unusual fatigue    COPD - stable - sob stable, not much cough or wheezing. Uses symbicort once daily in morning. Missed a few days and noticed worsening sob. Rarely using albuterol. Still smoking - cutting back. Tells me now her left leg feels like it has nerve pain now - went to ortho on call. Was prescribed muscle relaxer and steroids which helped some - about 3 weeks ago. Still has pain down leg to ankle. Was set up to see Dr. Ricardo Donis and cancelled appointment. Prior to Admission medications    Medication Sig Start Date End Date Taking? Authorizing Provider   levothyroxine (SYNTHROID) 150 mcg tablet TAKE 1 TABLET BY MOUTH EVERY DAY BEFORE BREAKFAST 11/17/20  Yes Rubin Lockett MD   PARoxetine (PAXIL) 40 mg tablet TAKE 1 TABLET BY MOUTH EVERY DAY 11/17/20  Yes Rubin Lockett MD   gabapentin (NEURONTIN) 300 mg capsule TAKE 1 CAPSULE BY MOUTH THREE TIMES A DAY 11/17/20  Yes Rubin Lockett MD   budesonide-formoteroL [facility]) 160-4.5 mcg/actuation HFAA Take 2 Puffs by inhalation two (2) times a day. 3/3/20  Yes Rubin Lockett MD   albuterol (PROVENTIL HFA, VENTOLIN HFA, PROAIR HFA) 90 mcg/actuation inhaler Take 1 Puff by inhalation every four (4) hours as needed for Wheezing. 3/3/20  Yes Rubin Lockett MD   albuterol-ipratropium (DUO-NEB) 2.5 mg-0.5 mg/3 ml nebu 3 mL by Nebulization route every four (4) hours as needed for Wheezing or Shortness of Breath. 3/3/20  Yes Rubin Lockett MD   fluticasone (FLONASE) 50 mcg/actuation nasal spray 2 Sprays by Both Nostrils route daily. 2/27/19  Yes Nila Ann MD   ibuprofen (MOTRIN IB) 200 mg tablet Take 200 mg by mouth as needed. Yes Provider, Historical   acetaminophen (TYLENOL) 325 mg tablet Take  by mouth every four (4) hours as needed for Pain.    Yes Provider, Historical   loratadine (CLARITIN) 10 mg tablet Take 10 mg by mouth as needed. Yes Provider, Historical   levothyroxine (SYNTHROID) 150 mcg tablet TAKE 1 TABLET BY MOUTH EVERY DAY BEFORE BREAKFAST 9/9/20 11/17/20  Ez Chavira MD   trimethoprim-sulfamethoxazole (BACTRIM DS) 160-800 mg per tablet Take 1 Tab by mouth two (2) times a day. 1/6/20 11/17/20  Hafsa Treviño MD   phenazopyridine (PYRIDIUM) 200 mg tablet Take 1 Tab by mouth three (3) times daily as needed for Pain. 1/6/20 11/17/20  Hafsa Treviño MD   gabapentin (NEURONTIN) 300 mg capsule TAKE 1 CAPSULE BY MOUTH THREE TIMES A DAY 11/25/19 11/17/20  Ez Chavira MD   PARoxetine (PAXIL) 40 mg tablet TAKE 1 TABLET BY MOUTH EVERY DAY 11/25/19 11/17/20  Ez Chavira MD         ROS  Per HPI  Objective:   No flowsheet data found.      [INSTRUCTIONS:  \"[x]\" Indicates a positive item  \"[]\" Indicates a negative item  -- DELETE ALL ITEMS NOT EXAMINED]    Constitutional: [x] Appears well-developed and well-nourished [x] No apparent distress      [] Abnormal -     Mental status: [x] Alert and awake  [x] Oriented to person/place/time [x] Able to follow commands    [] Abnormal -     Eyes:   EOM    [x]  Normal    [] Abnormal -   Sclera  [x]  Normal    [] Abnormal -          Discharge [x]  None visible   [] Abnormal -     HENT: [x] Normocephalic, atraumatic  [] Abnormal -   [x] Mouth/Throat: Mucous membranes are moist    External Ears [x] Normal  [] Abnormal -    Neck: [x] No visualized mass [] Abnormal -     Pulmonary/Chest: [x] Respiratory effort normal   [x] No visualized signs of difficulty breathing or respiratory distress        [] Abnormal -      Musculoskeletal:   [] Normal gait with no signs of ataxia         [x] Normal range of motion of neck        [] Abnormal -     Neurological:        [x] No Facial Asymmetry (Cranial nerve 7 motor function) (limited exam due to video visit)          [x] No gaze palsy        [] Abnormal -          Skin:        [x] No significant exanthematous lesions or discoloration noted on facial skin         [] Abnormal -            Psychiatric:       [x] Normal Affect [] Abnormal -        [x] No Hallucinations    Other pertinent observable physical exam findings:-        We discussed the expected course, resolution and complications of the diagnosis(es) in detail. Medication risks, benefits, costs, interactions, and alternatives were discussed as indicated. I advised her to contact the office if her condition worsens, changes or fails to improve as anticipated. She expressed understanding with the diagnosis(es) and plan. Lincoln Keys, who was evaluated through a patient-initiated, synchronous (real-time) audio-video encounter, and/or her healthcare decision maker, is aware that it is a billable service, with coverage as determined by her insurance carrier. She provided verbal consent to proceed: Yes, and patient identification was verified. It was conducted pursuant to the emergency declaration under the [address] [place] [address], [address] [place] [address] authority and the [employer] General Act. A caregiver was present when appropriate. Ability to conduct physical exam was limited. I was at home. The patient was at home.       Latrice Calhoun MD

## 2020-11-17 NOTE — PROGRESS NOTES
Marilee العلي  Identified pt with two pt identifiers(name and ). Chief Complaint   Patient presents with    Thyroid Problem     nerve pain       Reviewed record In preparation for visit and have obtained necessary documentation. 1. Have you been to the ER, urgent care clinic or hospitalized since your last visit? No     2. Have you seen or consulted any other health care providers outside of the [address] [place] [address] since your last visit? Include any pap smears or colon screening. No    Vitals reviewed with provider. Health Maintenance reviewed:     Health Maintenance Due   Topic    PAP AKA CERVICAL CYTOLOGY     DTaP/Tdap/Td series (2 - Td)    Shingrix Vaccine Age 49> (1 of 2)    Colorectal Cancer Screening Combo     Breast Cancer Screen Mammogram     Flu Vaccine (1)        Wt Readings from Last 3 Encounters:   20 152 lb (68.9 kg)   19 153 lb (69.4 kg)   19 157 lb (71.2 kg)      Temp Readings from Last 3 Encounters:   20 98.4 °F (36.9 °C)   19 97.6 °F (36.4 °C) (Oral)   19 97.1 °F (36.2 °C)      BP Readings from Last 3 Encounters:   20 129/67   19 119/80   19 141/76      Pulse Readings from Last 3 Encounters:   20 65   19 84   19 81      There were no vitals filed for this visit.        Learning Assessment:   :     Learning Assessment 2014   PRIMARY LEARNER Patient   HIGHEST LEVEL OF EDUCATION - PRIMARY LEARNER  DID NOT GRADUATE HIGH SCHOOL   BARRIERS PRIMARY LEARNER NONE   PRIMARY LANGUAGE ENGLISH    NEED No   LEARNER PREFERENCE PRIMARY DEMONSTRATION   ANSWERED BY patient   RELATIONSHIP SELF        Depression Screening:   :     3 most recent PHQ Screens 2019   Little interest or pleasure in doing things Not at all   Feeling down, depressed, irritable, or hopeless Not at all   Total Score PHQ 2 0   Trouble falling or staying asleep, or sleeping too much Not at all   Feeling tired or having little energy Not at all   Poor appetite, weight loss, or overeating Not at all   Feeling bad about yourself - or that you are a failure or have let yourself or your family down Not at all   Trouble concentrating on things such as school, work, reading, or watching TV Not at all   Moving or speaking so slowly that other people could have noticed; or the opposite being so fidgety that others notice Not at all   Thoughts of being better off dead, or hurting yourself in some way Not at all   PHQ 9 Score 0   How difficult have these problems made it for you to do your work, take care of your home and get along with others Not difficult at all        Fall Risk Assessment:   :     No flowsheet data found. Abuse Screening:   :     Abuse Screening Questionnaire 10/29/2018   Do you ever feel afraid of your partner? N   Are you in a relationship with someone who physically or mentally threatens you? N   Is it safe for you to go home?  Y        ADL Screening:   :     ADL Assessment 3/17/2015   Feeding yourself No Help Needed   Getting from bed to chair No Help Needed   Getting dressed No Help Needed   Bathing or showering No Help Needed   Walk across the room (includes cane/walker) No Help Needed   Using the telphone No Help Needed   Taking your medications No Help Needed   Preparing meals No Help Needed   Managing money (expenses/bills) No Help Needed   Moderately strenuous housework (laundry) No Help Needed   Shopping for personal items (toiletries/medicines) No Help Needed   Shopping for groceries No Help Needed   Driving No Help Needed   Climbing a flight of stairs No Help Needed   Getting to places beyond walking distances No Help Needed

## 2021-02-07 DIAGNOSIS — M79.2 NEUROPATHIC PAIN: ICD-10-CM

## 2021-02-08 RX ORDER — GABAPENTIN 300 MG/1
CAPSULE ORAL
Qty: 90 CAP | Refills: 1 | Status: SHIPPED | OUTPATIENT
Start: 2021-02-08 | End: 2021-05-03

## 2021-02-10 ENCOUNTER — TELEPHONE (OUTPATIENT)
Dept: INTERNAL MEDICINE CLINIC | Age: 51
End: 2021-02-10

## 2021-02-10 DIAGNOSIS — E03.9 ACQUIRED HYPOTHYROIDISM: ICD-10-CM

## 2021-02-10 RX ORDER — CYCLOBENZAPRINE HCL 10 MG
10 TABLET ORAL
Qty: 20 TAB | Refills: 0 | Status: SHIPPED | OUTPATIENT
Start: 2021-02-10 | End: 2021-07-29 | Stop reason: SDUPTHER

## 2021-02-10 RX ORDER — CYCLOBENZAPRINE HCL 10 MG
10 TABLET ORAL
COMMUNITY
Start: 2020-10-20 | End: 2021-02-10 | Stop reason: SDUPTHER

## 2021-02-10 NOTE — TELEPHONE ENCOUNTER
Pt called to state that her leg is really bothering her again (sciatica) and she is wanting to know if an rx for muscle relaxer could be called in to help w/ that. Pt is scheduled for vv on 2/26/21. Pt prefers [facility] in [place] for pharmacy.

## 2021-02-10 NOTE — TELEPHONE ENCOUNTER
I called the patient and verified them by name and date of birth. She informed me that she is experiencing sciatic pain in the left leg, hip and pinching in the lower back. This has happened before and drove her insane. It has been giving her warning signs for the past 3 days. Went to [facility] and they prescribed a muscle relaxer (Cyclobenzaprine) and it helped. She would like for it to be called in for her and has an appointment already scheduled for 02/26.

## 2021-02-11 NOTE — TELEPHONE ENCOUNTER
I called the patient and verified them by name and date of birth. I informed her on the message from Dr. Rudy Momin. She stated understanding and had no further questions.

## 2021-02-26 ENCOUNTER — VIRTUAL VISIT (OUTPATIENT)
Dept: INTERNAL MEDICINE CLINIC | Age: 51
End: 2021-02-26
Payer: COMMERCIAL

## 2021-02-26 DIAGNOSIS — J44.9 CHRONIC OBSTRUCTIVE PULMONARY DISEASE, UNSPECIFIED COPD TYPE (HCC): ICD-10-CM

## 2021-02-26 DIAGNOSIS — Z72.0 TOBACCO USE: ICD-10-CM

## 2021-02-26 DIAGNOSIS — M79.2 NEUROPATHIC PAIN: Primary | ICD-10-CM

## 2021-02-26 DIAGNOSIS — F41.1 GENERALIZED ANXIETY DISORDER: ICD-10-CM

## 2021-02-26 DIAGNOSIS — E03.9 ACQUIRED HYPOTHYROIDISM: ICD-10-CM

## 2021-02-26 PROCEDURE — 99214 OFFICE O/P EST MOD 30 MIN: CPT | Performed by: INTERNAL MEDICINE

## 2021-02-26 NOTE — PROGRESS NOTES
Negra Feliciano  Identified pt with two pt identifiers(name and ). Chief Complaint   Patient presents with    Thyroid Problem       Reviewed record In preparation for visit and have obtained necessary documentation. 1. Have you been to the ER, urgent care clinic or hospitalized since your last visit? No     2. Have you seen or consulted any other health care providers outside of the [address] [place] [address] since your last visit? Include any pap smears or colon screening. No    Patient does not have an advance directive. Vitals reviewed with provider. Health Maintenance reviewed:     Health Maintenance Due   Topic    Hepatitis C Screening     COVID-19 Vaccine (1 of 2)    PAP AKA CERVICAL CYTOLOGY     DTaP/Tdap/Td series (2 - Td)    Colorectal Cancer Screening Combo     Breast Cancer Screen Mammogram           Wt Readings from Last 3 Encounters:   20 152 lb (68.9 kg)   19 153 lb (69.4 kg)   19 157 lb (71.2 kg)        Temp Readings from Last 3 Encounters:   20 98.4 °F (36.9 °C)   19 97.6 °F (36.4 °C) (Oral)   19 97.1 °F (36.2 °C)        BP Readings from Last 3 Encounters:   20 129/67   19 119/80   19 141/76        Pulse Readings from Last 3 Encounters:   20 65   19 84   19 81      There were no vitals filed for this visit.        Learning Assessment:   :       Learning Assessment 2014   PRIMARY LEARNER Patient   HIGHEST LEVEL OF EDUCATION - PRIMARY LEARNER  DID NOT GRADUATE HIGH SCHOOL   BARRIERS PRIMARY LEARNER NONE   PRIMARY LANGUAGE ENGLISH    NEED No   LEARNER PREFERENCE PRIMARY DEMONSTRATION   ANSWERED BY patient   RELATIONSHIP SELF        Depression Screening:   :       3 most recent PHQ Screens 2021   Little interest or pleasure in doing things Not at all   Feeling down, depressed, irritable, or hopeless Not at all   Total Score PHQ 2 0   Trouble falling or staying asleep, or sleeping too much -   Feeling tired or having little energy -   Poor appetite, weight loss, or overeating -   Feeling bad about yourself - or that you are a failure or have let yourself or your family down -   Trouble concentrating on things such as school, work, reading, or watching TV -   Moving or speaking so slowly that other people could have noticed; or the opposite being so fidgety that others notice -   Thoughts of being better off dead, or hurting yourself in some way -   PHQ 9 Score -   How difficult have these problems made it for you to do your work, take care of your home and get along with others -        Fall Risk Assessment:   :     No flowsheet data found. Abuse Screening:   :       Abuse Screening Questionnaire 10/29/2018   Do you ever feel afraid of your partner? N   Are you in a relationship with someone who physically or mentally threatens you? N   Is it safe for you to go home?  Y        ADL Screening:   :       ADL Assessment 3/17/2015   Feeding yourself No Help Needed   Getting from bed to chair No Help Needed   Getting dressed No Help Needed   Bathing or showering No Help Needed   Walk across the room (includes cane/walker) No Help Needed   Using the telphone No Help Needed   Taking your medications No Help Needed   Preparing meals No Help Needed   Managing money (expenses/bills) No Help Needed   Moderately strenuous housework (laundry) No Help Needed   Shopping for personal items (toiletries/medicines) No Help Needed   Shopping for groceries No Help Needed   Driving No Help Needed   Climbing a flight of stairs No Help Needed   Getting to places beyond walking distances No Help Needed

## 2021-02-26 NOTE — PROGRESS NOTES
Nash Crockett is a 46 y.o. female who was seen by synchronous (real-time) audio-video technology on 2/26/2021 for Thyroid Problem    This visit was completed virtually using doxy. me    Assessment & Plan:     Diagnoses and all orders for this visit:    1. Neuropathic pain    2. Generalized anxiety disorder    3. Chronic obstructive pulmonary disease, unspecified COPD type ([address]    4. Tobacco use    5. Acquired hypothyroidism      Seems to be due for colonoscopy b/c last was poor prep - will check with patient at follow up  encouraged smoking cessation   Copd stable - continue current medications   Will get thyroid labs on Monday  Pain manageable with gabapentin  Follow-up and Dispositions    · Return in about 6 months (around 8/26/2021) for anxiety, copd - in person. Subjective:     Sciatica symptoms resolved. Taking gabapentin tid which helps neuropathic pain in b/l hands. Still working as     Energy is overall okay, some fatigue but doesn't feel like unusual fatigue. No chest pain. Has chronic sob with copd - not worse. Has gained some weight - eating more and less active. Plans to start walking. Had colonoscopy about 5 years ago - normal per patient - not sure where it was done. Needs to schedule pap and mammogram.     Will get labs soon - plans for Monday. Prior to Admission medications    Medication Sig Start Date End Date Taking? Authorizing Provider   cyclobenzaprine (FLEXERIL) 10 mg tablet Take 1 Tab by mouth two (2) times daily as needed for Muscle Spasm(s).  2/10/21  Yes Kanika Braun MD   gabapentin (NEURONTIN) 300 mg capsule TAKE 1 CAPSULE BY MOUTH THREE TIMES A DAY 2/8/21  Yes Kanika Braun MD   levothyroxine (SYNTHROID) 150 mcg tablet TAKE 1 TABLET BY MOUTH EVERY DAY BEFORE BREAKFAST 11/17/20  Yes Kanika Braun MD   PARoxetine (PAXIL) 40 mg tablet TAKE 1 TABLET BY MOUTH EVERY DAY 11/17/20  Yes Kanika Braun MD budesonide-formoteroL (SYMBICORT) 160-4.5 mcg/actuation HFAA Take 2 Puffs by inhalation two (2) times a day. 3/3/20  Yes Karen Pleitez MD   albuterol (PROVENTIL HFA, VENTOLIN HFA, PROAIR HFA) 90 mcg/actuation inhaler Take 1 Puff by inhalation every four (4) hours as needed for Wheezing. 3/3/20  Yes Karen Pleitez MD   albuterol-ipratropium (DUO-NEB) 2.5 mg-0.5 mg/3 ml nebu 3 mL by Nebulization route every four (4) hours as needed for Wheezing or Shortness of Breath. 3/3/20  Yes Karen Pleitez MD   ibuprofen (MOTRIN IB) 200 mg tablet Take 200 mg by mouth as needed. Yes Provider, Historical   acetaminophen (TYLENOL) 325 mg tablet Take  by mouth every four (4) hours as needed for Pain. Yes Provider, Historical   loratadine (CLARITIN) 10 mg tablet Take 10 mg by mouth as needed. Yes Provider, Historical   fluticasone (FLONASE) 50 mcg/actuation nasal spray 2 Sprays by Both Nostrils route daily. 2/27/19   Nikunj Simons MD         ROS  Per HPI  Objective:   No flowsheet data found.      [INSTRUCTIONS:  \"[x]\" Indicates a positive item  \"[]\" Indicates a negative item  -- DELETE ALL ITEMS NOT EXAMINED]    Constitutional: [x] Appears well-developed and well-nourished [x] No apparent distress      [] Abnormal -     Mental status: [x] Alert and awake  [x] Oriented to person/place/time [x] Able to follow commands    [] Abnormal -     Eyes:   EOM    [x]  Normal    [] Abnormal -   Sclera  [x]  Normal    [] Abnormal -          Discharge [x]  None visible   [] Abnormal -     HENT: [x] Normocephalic, atraumatic  [] Abnormal -   [x] Mouth/Throat: Mucous membranes are moist    External Ears [x] Normal  [] Abnormal -    Neck: [x] No visualized mass [] Abnormal -     Pulmonary/Chest: [x] Respiratory effort normal   [x] No visualized signs of difficulty breathing or respiratory distress        [] Abnormal -      Musculoskeletal:   [x] Normal gait with no signs of ataxia         [x] Normal range of motion of neck [] Abnormal -     Neurological:        [x] No Facial Asymmetry (Cranial nerve 7 motor function) (limited exam due to video visit)          [x] No gaze palsy        [] Abnormal -          Skin:        [x] No significant exanthematous lesions or discoloration noted on facial skin         [] Abnormal -            Psychiatric:       [x] Normal Affect [] Abnormal -        [x] No Hallucinations    Other pertinent observable physical exam findings:-        We discussed the expected course, resolution and complications of the diagnosis(es) in detail. Medication risks, benefits, costs, interactions, and alternatives were discussed as indicated. I advised her to contact the office if her condition worsens, changes or fails to improve as anticipated. She expressed understanding with the diagnosis(es) and plan. Maribel Toney, who was evaluated through a patient-initiated, synchronous (real-time) audio-video encounter, and/or her healthcare decision maker, is aware that it is a billable service, with coverage as determined by her insurance carrier. She provided verbal consent to proceed: Yes, and patient identification was verified. It was conducted pursuant to the emergency declaration under the [address] [place] [address] authority and the [employer] Act. A caregiver was present when appropriate. Ability to conduct physical exam was limited. I was at home. The patient was at home.       Helga Morris MD

## 2021-03-30 LAB
ALBUMIN SERPL-MCNC: 4.6 G/DL (ref 3.8–4.9)
ALBUMIN/GLOB SERPL: 1.8 {RATIO} (ref 1.2–2.2)
ALP SERPL-CCNC: 87 IU/L (ref 39–117)
ALT SERPL-CCNC: 8 IU/L (ref 0–32)
AST SERPL-CCNC: 17 IU/L (ref 0–40)
BILIRUB SERPL-MCNC: 0.3 MG/DL (ref 0–1.2)
BUN SERPL-MCNC: 11 MG/DL (ref 6–24)
BUN/CREAT SERPL: 16 (ref 9–23)
CALCIUM SERPL-MCNC: 9.4 MG/DL (ref 8.7–10.2)
CHLORIDE SERPL-SCNC: 101 MMOL/L (ref 96–106)
CHOLEST SERPL-MCNC: 222 MG/DL (ref 100–199)
CO2 SERPL-SCNC: 27 MMOL/L (ref 20–29)
CREAT SERPL-MCNC: 0.68 MG/DL (ref 0.57–1)
GLOBULIN SER CALC-MCNC: 2.5 G/DL (ref 1.5–4.5)
GLUCOSE SERPL-MCNC: 81 MG/DL (ref 65–99)
HDLC SERPL-MCNC: 39 MG/DL
LDLC SERPL CALC-MCNC: 146 MG/DL (ref 0–99)
POTASSIUM SERPL-SCNC: 4.4 MMOL/L (ref 3.5–5.2)
PROT SERPL-MCNC: 7.1 G/DL (ref 6–8.5)
SODIUM SERPL-SCNC: 138 MMOL/L (ref 134–144)
T4 FREE SERPL-MCNC: 1.74 NG/DL (ref 0.82–1.77)
TRIGL SERPL-MCNC: 201 MG/DL (ref 0–149)
TSH SERPL DL<=0.005 MIU/L-ACNC: 0.17 UIU/ML (ref 0.45–4.5)
VLDLC SERPL CALC-MCNC: 37 MG/DL (ref 5–40)

## 2021-04-02 DIAGNOSIS — E03.9 ACQUIRED HYPOTHYROIDISM: ICD-10-CM

## 2021-04-02 RX ORDER — LEVOTHYROXINE SODIUM 150 UG/1
TABLET ORAL
Qty: 90 TAB | Refills: 0 | Status: SHIPPED | OUTPATIENT
Start: 2021-04-02 | End: 2021-07-12

## 2021-04-02 NOTE — PROGRESS NOTES
Called pt, HIPAA verified by two patient identifiers. Read note left by PCP. Advised that I will mail lab order and that new rx has been sent into pharmacy. Pt verified understanding.

## 2021-04-02 NOTE — PROGRESS NOTES
Based on the ACC/AHA cardiovascular risk assessment calculator your 10 year risk for first occurrence of a heart attack, death from heart disease, or stroke is 6.4%. I want you to work on decreasing processed foods in diet and increase plant based foods. Levothyroxine dose is too high - decrease to 150mcg 6 days per week and repeat thyroid labs in about 3 months.

## 2021-04-12 ENCOUNTER — TELEPHONE (OUTPATIENT)
Dept: INTERNAL MEDICINE CLINIC | Age: 51
End: 2021-04-12

## 2021-04-12 NOTE — TELEPHONE ENCOUNTER
----- Message from Chaparrita Song sent at 4/12/2021 11:22 AM EDT -----  Regarding: Dr. Skip Bourgeois Message/Vendor Calls    Caller's first and last name: Reena Wiley      Reason for call: Fax consultation page to Pt's neurologist      Callback required yes/no and why: Yes      Best contact number(s): [phone]      Details to clarify the request: Pt has an appt with her neurologist, Dr. Britney Khan, and Dr. Britney Khan needs the pt's consultation page with her last office visit notes and images.  Pt needs that information sent to Dr. Britney Khan at [address]

## 2021-04-20 DIAGNOSIS — F41.9 ANXIETY: Primary | ICD-10-CM

## 2021-04-20 RX ORDER — PAROXETINE HYDROCHLORIDE 40 MG/1
TABLET, FILM COATED ORAL
Qty: 90 TAB | Refills: 1 | Status: SHIPPED | OUTPATIENT
Start: 2021-04-20 | End: 2021-10-25

## 2021-04-20 NOTE — TELEPHONE ENCOUNTER
PCP: Sravanthi Clark MD    Last appt: 2/26/2021  Future Appointments   Date Time Provider Chuy You   8/24/2021 10:45 AM Sravanthi Clark MD [facility] AMB       Requested Prescriptions     Pending Prescriptions Disp Refills    PARoxetine (PAXIL) 40 mg tablet 90 Tab 1     Sig: TAKE 1 TABLET BY MOUTH EVERY DAY

## 2021-04-20 NOTE — TELEPHONE ENCOUNTER
Requested Prescriptions     Pending Prescriptions Disp Refills    PARoxetine (PAXIL) 40 mg tablet 90 Tab 1     Sig: TAKE 1 TABLET BY MOUTH EVERY DAY     Pharmacy needs 90  days of RX.

## 2021-05-03 DIAGNOSIS — M79.2 NEUROPATHIC PAIN: ICD-10-CM

## 2021-05-03 RX ORDER — GABAPENTIN 300 MG/1
CAPSULE ORAL
Qty: 90 CAP | Refills: 1 | Status: SHIPPED | OUTPATIENT
Start: 2021-05-03 | End: 2021-08-02

## 2021-07-11 DIAGNOSIS — E03.9 ACQUIRED HYPOTHYROIDISM: ICD-10-CM

## 2021-07-12 RX ORDER — LEVOTHYROXINE SODIUM 150 UG/1
TABLET ORAL
Qty: 90 TABLET | Refills: 0 | Status: SHIPPED | OUTPATIENT
Start: 2021-07-12 | End: 2022-04-19 | Stop reason: SDUPTHER

## 2021-07-29 RX ORDER — CYCLOBENZAPRINE HCL 10 MG
10 TABLET ORAL
Qty: 20 TABLET | Refills: 0 | Status: SHIPPED | OUTPATIENT
Start: 2021-07-29 | End: 2022-09-15 | Stop reason: SDUPTHER

## 2021-07-29 NOTE — TELEPHONE ENCOUNTER
PCP: Elisha Peterson MD    Last appt: 2/26/2021  Future Appointments   Date Time Provider Chuy You   8/24/2021 10:45 AM Elisha Peterson MD [facility] AMB       Requested Prescriptions     Pending Prescriptions Disp Refills    cyclobenzaprine (FLEXERIL) 10 mg tablet 20 Tablet 0     Sig: Take 1 Tablet by mouth two (2) times daily as needed for Muscle Spasm(s).

## 2021-07-29 NOTE — TELEPHONE ENCOUNTER
Patient called and ask if Dr Steffany Yusuf could give her a muscle relaxer she has given this to her before.

## 2021-07-31 DIAGNOSIS — M79.2 NEUROPATHIC PAIN: ICD-10-CM

## 2021-08-02 RX ORDER — GABAPENTIN 300 MG/1
CAPSULE ORAL
Qty: 90 CAPSULE | Refills: 1 | Status: SHIPPED | OUTPATIENT
Start: 2021-08-02 | End: 2022-07-18

## 2021-08-24 ENCOUNTER — TELEPHONE (OUTPATIENT)
Dept: INTERNAL MEDICINE CLINIC | Age: 51
End: 2021-08-24

## 2021-08-24 NOTE — TELEPHONE ENCOUNTER
Pt called and stated that she received a bill for her last visit and pt is unable to get a hold of the billing office to figure out what the bill was for. It looks like the correct insurance was not in for the visit and has now been corrected and just needs to be resubmitted.

## 2021-10-11 ENCOUNTER — OFFICE VISIT (OUTPATIENT)
Dept: URGENT CARE | Age: 51
End: 2021-10-11
Payer: COMMERCIAL

## 2021-10-11 VITALS — RESPIRATION RATE: 18 BRPM | TEMPERATURE: 97.9 F | HEART RATE: 88 BPM | OXYGEN SATURATION: 97 %

## 2021-10-11 DIAGNOSIS — Z20.822 EXPOSURE TO COVID-19 VIRUS: Primary | ICD-10-CM

## 2021-10-11 LAB — SARS-COV-2 POC: NEGATIVE

## 2021-10-11 PROCEDURE — 87426 SARSCOV CORONAVIRUS AG IA: CPT | Performed by: FAMILY MEDICINE

## 2021-10-11 PROCEDURE — 99212 OFFICE O/P EST SF 10 MIN: CPT | Performed by: FAMILY MEDICINE

## 2021-10-11 NOTE — LETTER
October 11, 2021  [address] [id number]    Dear Rene Graham: Thank you for requesting access to [employer]. Please follow the instructions below to securely access and download your online medical record. [employer] allows you to send messages to your doctor, view your test results, renew your prescriptions, schedule appointments, and more. How Do I Sign Up? 1. In your internet browser, go to [email or web address]. 2. Click on the First Time User? Click Here link in the Sign In box. You will see the New Member Sign Up page. 3. Enter your [employer] Access Code exactly as it appears below. You will not need to use this code after youve completed the sign-up process. If you do not sign up before the expiration date, you must request a new code. [employer] Access Code: [email or web address]  Expires: 11/11/2021  6:13 AM     4. Enter the last four digits of your Social Security Number (xxxx) and Date of Birth (mm/dd/yyyy) as indicated and click Submit. You will be taken to the next sign-up page. 5. Create a [employer] ID. This will be your [employer] login ID and cannot be changed, so think of one that is secure and easy to remember. 6. Create a [employer] password. You can change your password at any time. 7. Enter your Password Reset Question and Answer. This can be used at a later time if you forget your password. 8. Enter your e-mail address. You will receive e-mail notification when new information is available in [address]. 9. Click Sign Up. You can now view and download portions of your medical record. 10. Click the Download Summary menu link to download a portable copy of your medical information. Additional Information    If you have questions, please visit the Frequently Asked Questions section of the [employer] website at [email or web address] Remember, [employer] is NOT to be used for urgent needs. For medical emergencies, dial 911.     Now available from your iPhone and Android!     Sincerely,   [employer]

## 2021-10-11 NOTE — PROGRESS NOTES
Subjective: (As above and below)       This patient was seen in [facility] at [address] [place] [address] [facility]gent [facility] while in their vehicle due to COVID-19 pandemic with PPE and focused examination in order to decrease community viral transmission. The patient/guardian gave verbal consent to treat. Chief Complaint   Patient presents with    Cough     has COPD, exposure to daughter and grandkids - they tested positive last Thursday,      Nicole Keene is a 46 y.o. female who presents for COVID-19 Exposure and testing. Known contact with: 3 family members who are all positive for covid 23 and live in same home. Currently has not tried any therapies. Feels well and denies any symptoms at this point in time and denies fever, aches, SOB or other resp symptoms      Review of Systems - negative except as listed above    Reviewed PmHx, RxHx, FmHx, SocHx, AllgHx and updated in chart.   Family History   Problem Relation Age of Onset   [address] Diabetes Mother     Hypertension Mother     Cancer Mother         uterine--chemo tx    Heart Disease Father        Past Medical History:   Diagnosis Date    Allergic rhinitis     COPD (chronic obstructive pulmonary disease) ([facility] [address]     mild    Generalized anxiety disorder     Hypothyroidism     Psychiatric disorder       Social History     Socioeconomic History    Marital status:      Spouse name: Not on file    Number of children: Not on file    Years of education: Not on file    Highest education level: Not on file   Tobacco Use    Smoking status: Current Every Day Smoker     Packs/day: 1.50     Years: 20.00     Pack years: 30.00     Types: Cigarettes    Smokeless tobacco: Never Used    Tobacco comment: 1ppd 11/25/19   Substance and Sexual Activity    Alcohol use: No     Alcohol/week: 0.0 standard drinks    Drug use: No    Sexual activity: Yes     Social Determinants of Health     Financial Resource Strain:     Difficulty of Paying Living Expenses:    Food Insecurity:     Worried About Running Out of Food in the Last Year:     [address] in the Last Year:    Transportation Needs:     Lack of Transportation (Medical):  Lack of Transportation (Non-Medical):    Physical Activity:     Days of Exercise per Week:     Minutes of Exercise per Session:    Stress:     Feeling of Stress :    Social Connections:     Frequency of Communication with Friends and Family:     Frequency of Social Gatherings with Friends and Family:     Attends Buddhist Services:     Active Member of Clubs or Organizations:     Attends Club or Organization Meetings:     Marital Status:           Current Outpatient Medications   Medication Sig    gabapentin (NEURONTIN) 300 mg capsule TAKE 1 CAPSULE BY MOUTH THREE TIMES A DAY    levothyroxine (SYNTHROID) 150 mcg tablet TAKE 1 TABLET BY MOUTH 6 DAYS PER WEEK BEFORE BREAKFAST    PARoxetine (PAXIL) 40 mg tablet TAKE 1 TABLET BY MOUTH EVERY DAY    budesonide-formoteroL (SYMBICORT) 160-4.5 mcg/actuation HFAA Take 2 Puffs by inhalation two (2) times a day.  albuterol (PROVENTIL HFA, VENTOLIN HFA, PROAIR HFA) 90 mcg/actuation inhaler Take 1 Puff by inhalation every four (4) hours as needed for Wheezing.  albuterol-ipratropium (DUO-NEB) 2.5 mg-0.5 mg/3 ml nebu 3 mL by Nebulization route every four (4) hours as needed for Wheezing or Shortness of Breath.  fluticasone (FLONASE) 50 mcg/actuation nasal spray 2 Sprays by Both Nostrils route daily.  ibuprofen (MOTRIN IB) 200 mg tablet Take 200 mg by mouth as needed.  acetaminophen (TYLENOL) 325 mg tablet Take  by mouth every four (4) hours as needed for Pain.  loratadine (CLARITIN) 10 mg tablet Take 10 mg by mouth as needed.  cyclobenzaprine (FLEXERIL) 10 mg tablet Take 1 Tablet by mouth two (2) times daily as needed for Muscle Spasm(s). No current facility-administered medications for this visit.        Objective:     Vitals:    10/11/21 1505   Pulse: 88   Resp: 18 Temp: 97.9 °F (36.6 °C)   SpO2: 97%       Physical Exam  General appearance  appears well hydrated and does not appear toxic, no acute distress  Eyes - EOMs intact. Non injected. Ears - no external swelling  Neck/Lymphatics  no obvious swelling  Chest - normal breathing effort. No active cough heard. No audible wheezing. No wheeze rales or rhonchi bilat  Heart - RRR no murmurs  Skin - no observable rashes or pallor  Neurologic- alert and oriented x 3  Psychiatric- normal mood, behavior and though content. Assessment/ Plan:     1. Exposure to COVID-19 virus    - AMB POC SARS-COV-2  - NOVEL CORONAVIRUS (COVID-19); Future  - NOVEL CORONAVIRUS (COVID-19)    Will test for COVID-19 given exposure  Rapid covid 19 test result is negative today. PCR test pending; will call by phone for any positive results. Results for orders placed or performed in visit on 10/11/21   AMB POC SARS-COV-2   Result Value Ref Range    SARS-COV-2 POC Negative Negative       Supportive home care for any development of mild symptoms - tylenol, maintain adequate fluid intake, deep breathing exercises  Self isolate/quarantine advised based on recommendations in current CDC guidelines. Follow up:   We have reviewed, in detail, particular presentations/worsening/concerning signs and symptoms that may warrant seeking immediate care       Lorrie Otero NP

## 2021-10-13 LAB
SARS-COV-2, NAA 2 DAY TAT: NORMAL
SARS-COV-2, NAA: NOT DETECTED

## 2021-10-18 RX ORDER — BUDESONIDE AND FORMOTEROL FUMARATE DIHYDRATE 160; 4.5 UG/1; UG/1
AEROSOL RESPIRATORY (INHALATION)
Qty: 10.2 EACH | Refills: 11 | Status: SHIPPED | OUTPATIENT
Start: 2021-10-18 | End: 2022-08-25

## 2022-02-28 ENCOUNTER — TELEPHONE (OUTPATIENT)
Dept: INTERNAL MEDICINE CLINIC | Age: 52
End: 2022-02-28

## 2022-02-28 DIAGNOSIS — Z11.59 ENCOUNTER FOR HEPATITIS C SCREENING TEST FOR LOW RISK PATIENT: ICD-10-CM

## 2022-02-28 DIAGNOSIS — J44.9 CHRONIC OBSTRUCTIVE PULMONARY DISEASE, UNSPECIFIED COPD TYPE (HCC): ICD-10-CM

## 2022-02-28 DIAGNOSIS — E03.9 ACQUIRED HYPOTHYROIDISM: Primary | ICD-10-CM

## 2022-02-28 NOTE — TELEPHONE ENCOUNTER
----- Message from Nehemiah Covington sent at 2/28/2022  9:13 AM EST -----  Subject: Message to Provider    QUESTIONS  Information for Provider? Pt scheduled appt for March14th. She thinks she   may need bloodwork done prior to the appt. Pt asked if Dr J Carlos Maria can send   out lab orders for her to have that done. Please send lab orders via mail   to her address on file.   ---------------------------------------------------------------------------  --------------  [address]  What is the best way for the office to contact you? OK to leave message on   voicemail  Preferred Call Back Phone Number? [phone]  ---------------------------------------------------------------------------  --------------  SCRIPT ANSWERS  Relationship to Patient?  Self

## 2022-03-14 ENCOUNTER — OFFICE VISIT (OUTPATIENT)
Dept: INTERNAL MEDICINE CLINIC | Age: 52
End: 2022-03-14
Payer: COMMERCIAL

## 2022-03-14 VITALS
BODY MASS INDEX: 26.94 KG/M2 | HEIGHT: 64 IN | DIASTOLIC BLOOD PRESSURE: 77 MMHG | RESPIRATION RATE: 16 BRPM | TEMPERATURE: 98.1 F | HEART RATE: 76 BPM | WEIGHT: 157.8 LBS | OXYGEN SATURATION: 95 % | SYSTOLIC BLOOD PRESSURE: 124 MMHG

## 2022-03-14 DIAGNOSIS — F41.9 ANXIETY: ICD-10-CM

## 2022-03-14 DIAGNOSIS — Z23 NEEDS FLU SHOT: ICD-10-CM

## 2022-03-14 DIAGNOSIS — Z13.31 POSITIVE DEPRESSION SCREENING: ICD-10-CM

## 2022-03-14 DIAGNOSIS — Z23 ENCOUNTER FOR IMMUNIZATION: ICD-10-CM

## 2022-03-14 DIAGNOSIS — M79.2 NEUROPATHIC PAIN: ICD-10-CM

## 2022-03-14 DIAGNOSIS — Z11.59 ENCOUNTER FOR HEPATITIS C SCREENING TEST FOR LOW RISK PATIENT: ICD-10-CM

## 2022-03-14 DIAGNOSIS — R23.3 EASY BRUISING: ICD-10-CM

## 2022-03-14 DIAGNOSIS — E03.9 ACQUIRED HYPOTHYROIDISM: Primary | ICD-10-CM

## 2022-03-14 DIAGNOSIS — J44.9 CHRONIC OBSTRUCTIVE PULMONARY DISEASE, UNSPECIFIED COPD TYPE (HCC): ICD-10-CM

## 2022-03-14 DIAGNOSIS — F41.1 GENERALIZED ANXIETY DISORDER: ICD-10-CM

## 2022-03-14 PROCEDURE — 90686 IIV4 VACC NO PRSV 0.5 ML IM: CPT | Performed by: INTERNAL MEDICINE

## 2022-03-14 PROCEDURE — 99214 OFFICE O/P EST MOD 30 MIN: CPT | Performed by: INTERNAL MEDICINE

## 2022-03-14 PROCEDURE — 90471 IMMUNIZATION ADMIN: CPT | Performed by: INTERNAL MEDICINE

## 2022-03-14 PROCEDURE — 90715 TDAP VACCINE 7 YRS/> IM: CPT | Performed by: INTERNAL MEDICINE

## 2022-03-14 PROCEDURE — 90472 IMMUNIZATION ADMIN EACH ADD: CPT | Performed by: INTERNAL MEDICINE

## 2022-03-14 RX ORDER — BUPROPION HYDROCHLORIDE 150 MG/1
150 TABLET ORAL
Qty: 30 TABLET | Refills: 2 | Status: SHIPPED | OUTPATIENT
Start: 2022-03-14 | End: 2022-04-13 | Stop reason: SDUPTHER

## 2022-03-14 RX ORDER — LEVOTHYROXINE SODIUM 150 UG/1
TABLET ORAL
Qty: 90 TABLET | Refills: 0 | Status: CANCELLED | OUTPATIENT
Start: 2022-03-14

## 2022-03-14 RX ORDER — PAROXETINE HYDROCHLORIDE 40 MG/1
40 TABLET, FILM COATED ORAL DAILY
Qty: 90 TABLET | Refills: 0 | Status: SHIPPED | OUTPATIENT
Start: 2022-03-14 | End: 2022-09-15

## 2022-03-14 NOTE — PROGRESS NOTES
HPI  Ms. Isaiah Higuera is a 46y.o. year old female, she is seen today for follow up hypothyroidism. Has been taking levothyroxine 200mcg daily instead of 150mcg dose and says she feels better. But did go a week without levothyroxine 150mcg daily - during that week was really tired, couldn't think well. Mood - was worse when getting off gabapentin - has been off for 2-3 weeks. Doesn't want to go back on it. Was taking for neuropathic pain left arm, better now. Still declines seeing surgeon for left arm weakness, neuropathic pain. More stressors lately, no SI. Mood will be down sometimes. Grandchildren 5 and 7 live with her which she enjoys. Takes flexeril rarely for left leg pain, muscle spasm. Still using symbicort 1-2 x daily for copd - no unusual cough, wheezing, sob, chest pain. Chief Complaint   Patient presents with    Thyroid Problem     Room 2C //    Theodoro Milder Immunization/Injection        Prior to Admission medications    Medication Sig Start Date End Date Taking? Authorizing Provider   PARoxetine (PAXIL) 40 mg tablet Take 1 Tablet by mouth daily. 3/14/22  Yes Stephanie Juarez MD   buPROPion XL (WELLBUTRIN XL) 150 mg tablet Take 1 Tablet by mouth every morning. 3/14/22  Yes Stephanie Juarez MD   Symbicort 160-4.5 mcg/actuation HFAA TAKE 2 PUFFS BY INHALATION TWO (2) TIMES A DAY. 10/18/21  Yes Stephanie Juarez MD   cyclobenzaprine (FLEXERIL) 10 mg tablet Take 1 Tablet by mouth two (2) times daily as needed for Muscle Spasm(s). 7/29/21  Yes Stephanie Juarez MD   albuterol (PROVENTIL HFA, VENTOLIN HFA, PROAIR HFA) 90 mcg/actuation inhaler Take 1 Puff by inhalation every four (4) hours as needed for Wheezing. 3/3/20  Yes Stephanie Juarez MD   albuterol-ipratropium (DUO-NEB) 2.5 mg-0.5 mg/3 ml nebu 3 mL by Nebulization route every four (4) hours as needed for Wheezing or Shortness of Breath.  3/3/20  Yes Stephanie Juarez MD   fluticasone [facility]) 50 mcg/actuation nasal spray 2 Sprays by Both Nostrils route daily. 2/27/19  Yes Anastasiia Wilcox MD   ibuprofen (MOTRIN IB) 200 mg tablet Take 200 mg by mouth as needed. Yes Provider, Historical   acetaminophen (TYLENOL) 325 mg tablet Take  by mouth every four (4) hours as needed for Pain. Yes Provider, Historical   loratadine (CLARITIN) 10 mg tablet Take 10 mg by mouth as needed. Yes Provider, Historical   PARoxetine (PAXIL) 40 mg tablet TAKE 1 TABLET BY MOUTH EVERY DAY 1/23/22 3/14/22  Ly Mccormack MD   gabapentin (NEURONTIN) 300 mg capsule TAKE 1 CAPSULE BY MOUTH THREE TIMES A DAY  Patient not taking: Reported on 3/14/2022 8/2/21   Ly Mccormack MD   levothyroxine (SYNTHROID) 150 mcg tablet TAKE 1 TABLET BY MOUTH 6 DAYS PER WEEK BEFORE BREAKFAST  Patient not taking: Reported on 3/14/2022 7/12/21   Ly Mccormack MD         Allergies   Allergen Reactions    Hydrocodone Hives         REVIEW OF SYSTEMS:  Per HPI    PHYSICAL EXAM:  Visit Vitals  /77 (BP 1 Location: Left upper arm, BP Patient Position: Sitting, BP Cuff Size: Adult)   Pulse 76   Temp 98.1 °F (36.7 °C) (Oral)   Resp 16   Ht 5' 4\" (1.626 m)   Wt 157 lb 12.8 oz (71.6 kg)   SpO2 95%   BMI 27.09 kg/m²     Constitutional: Appears well-developed and well-nourished. No distress. HENT:   Head: Normocephalic and atraumatic. Eyes: No scleral icterus. Neck: no lad, no tm, supple   Cardiovascular: Normal S1/S2, regular rhythm. No murmurs, rubs, or gallops. Pulmonary/Chest: Effort normal and breath sounds normal. No respiratory distress. No wheezes, rhonchi, or rales. Ext: No edema. Neurological: Alert. Psychiatric: Normal mood and affect.  Behavior is normal.     Lab Results   Component Value Date/Time    Sodium 138 03/29/2021 11:33 AM    Potassium 4.4 03/29/2021 11:33 AM    Chloride 101 03/29/2021 11:33 AM    CO2 27 03/29/2021 11:33 AM    Anion gap 9 05/02/2017 11:30 AM    Glucose 81 03/29/2021 11:33 AM    BUN 11 03/29/2021 11:33 AM    Creatinine 0.68 03/29/2021 11:33 AM    BUN/Creatinine ratio 16 03/29/2021 11:33 AM    GFR est  03/29/2021 11:33 AM    GFR est non- 03/29/2021 11:33 AM    Calcium 9.4 03/29/2021 11:33 AM    Bilirubin, total 0.3 03/29/2021 11:33 AM    Alk. phosphatase 87 03/29/2021 11:33 AM    Protein, total 7.1 03/29/2021 11:33 AM    Albumin 4.6 03/29/2021 11:33 AM    Globulin 3.3 05/02/2017 11:30 AM    A-G Ratio 1.8 03/29/2021 11:33 AM    ALT (SGPT) 8 03/29/2021 11:33 AM     Lab Results   Component Value Date/Time    Hemoglobin A1c 5.5 05/02/2017 11:30 AM      Lab Results   Component Value Date/Time    Cholesterol, total 222 (H) 03/29/2021 11:33 AM    HDL Cholesterol 39 (L) 03/29/2021 11:33 AM    LDL, calculated 146 (H) 03/29/2021 11:33 AM    LDL, calculated 115 (H) 07/02/2018 10:13 AM    VLDL, calculated 37 03/29/2021 11:33 AM    VLDL, calculated 27 07/02/2018 10:13 AM    Triglyceride 201 (H) 03/29/2021 11:33 AM          ASSESSMENT/PLAN  Diagnoses and all orders for this visit:    1. Acquired hypothyroidism  -     METABOLIC PANEL, COMPREHENSIVE; Future  -     LIPID PANEL; Future  -     TSH 3RD GENERATION; Future  -     T4, FREE; Future    2. Anxiety  -     PARoxetine (PAXIL) 40 mg tablet; Take 1 Tablet by mouth daily. 3. Needs flu shot  -     INFLUENZA VIRUS VAC QUAD,SPLIT,PRESV FREE SYRINGE IM    4. Encounter for immunization  -     NH IMMUNIZ ADMIN,1 SINGLE/COMB VAC/TOXOID  -     TETANUS, DIPHTHERIA TOXOIDS AND ACELLULAR PERTUSSIS VACCINE (TDAP), IN INDIVIDS. >=7, IM    5. Positive depression screening    6. Generalized anxiety disorder  -     buPROPion XL (WELLBUTRIN XL) 150 mg tablet; Take 1 Tablet by mouth every morning. 7. Neuropathic pain    8. Chronic obstructive pulmonary disease, unspecified COPD type (HCC)    9. Easy bruising  -     CBC WITH AUTOMATED DIFF; Future    10. Encounter for hepatitis C screening test for low risk patient  -     HCV AB W/RFLX TO GUILLE; Future    mood not as well controlled, add Wellbutrin.   As noted easy bruising, check CBC. Recommended seeing a surgeon for left arm neuropathy and weakness but she declines at this time. She is no longer on gabapentin and does not need it now. COPD stable, continue current medications and encourage smoking cessation. Health Maintenance Due   Topic Date Due    Hepatitis C Screening  Never done    Cervical cancer screen  Never done    Colorectal Cancer Screening Combo  09/20/2016    Low dose CT lung screening  Never done    Breast Cancer Screen Mammogram  Never done    Depression Monitoring  02/26/2022        Follow-up and Dispositions    · Return in about 6 weeks (around 4/25/2022) for thyroid, med eval.            Reviewed plan of care. Patient has provided input and agrees with goals. The nurse provided the patient and/or family with advanced directive information if needed and encouraged the patient to provide a copy to the office when available. Depression screen positive, PHQ 9 Score: 16, additional evaluation and assessment performed, follow-up plan includes but not limited to: Medication management and Referral to /Specialist  for evaluation and management.

## 2022-03-14 NOTE — PATIENT INSTRUCTIONS
Vaccine Information Statement    Tdap (Tetanus, Diphtheria, Pertussis) Vaccine: What You Need to Know     Many vaccine information statements are available in Japanese and other languages. See www.immunize.org/vis. Hojas de información sobre vacunas están disponibles en español y en muchos otros idiomas. Visite www.immunize.org/vis. 1. Why get vaccinated? Tdap vaccine can prevent tetanus, diphtheria, and pertussis. Diphtheria and pertussis spread from person to person. Tetanus enters the body through cuts or wounds.  TETANUS (T) causes painful stiffening of the muscles. Tetanus can lead to serious health problems, including being unable to open the mouth, having trouble swallowing and breathing, or death.  DIPHTHERIA (D) can lead to difficulty breathing, heart failure, paralysis, or death.  PERTUSSIS (aP), also known as whooping cough, can cause uncontrollable, violent coughing that makes it hard to breathe, eat, or drink. Pertussis can be extremely serious especially in babies and young children, causing pneumonia, convulsions, brain damage, or death. In teens and adults, it can cause weight loss, loss of bladder control, passing out, and rib fractures from severe coughing. 2. Tdap vaccine     Tdap is only for children 7 years and older, adolescents, and adults. Adolescents should receive a single dose of Tdap, preferably at age 6 or 15 years. Pregnant people should get a dose of Tdap during every pregnancy, preferably during the early part of the third trimester, to help protect the  from pertussis. Infants are most at risk for severe, life-threatening complications from pertussis. Adults who have never received Tdap should get a dose of Tdap.       Also, adults should receive a booster dose of either Tdap or Td (a different vaccine that protects against tetanus and diphtheria but not pertussis) every 10 years, or after 5 years in the case of a severe or dirty wound or burn. Tdap may be given at the same time as other vaccines. 3. Talk with your health care provider    Tell your vaccination provider if the person getting the vaccine:   Has had an allergic reaction after a previous dose of any vaccine that protects against tetanus, diphtheria, or pertussis, or has any severe, life-threatening allergies    Has had a coma, decreased level of consciousness, or prolonged seizures within 7 days after a previous dose of any pertussis vaccine (DTP, DTaP, or Tdap)   Has seizures or another nervous system problem   Has ever had Guillain-Barré Syndrome (also called GBS)   Has had severe pain or swelling after a previous dose of any vaccine that protects against tetanus or diphtheria    In some cases, your health care provider may decide to postpone Tdap vaccination until a future visit. People with minor illnesses, such as a cold, may be vaccinated. People who are moderately or severely ill should usually wait until they recover before getting Tdap vaccine. Your health care provider can give you more information. 4. Risks of a vaccine reaction     Pain, redness, or swelling where the shot was given, mild fever, headache, feeling tired, and nausea, vomiting, diarrhea, or stomachache sometimes happen after Tdap vaccination. People sometimes faint after medical procedures, including vaccination. Tell your provider if you feel dizzy or have vision changes or ringing in the ears. As with any medicine, there is a very remote chance of a vaccine causing a severe allergic reaction, other serious injury, or death. 5. What if there is a serious problem? An allergic reaction could occur after the vaccinated person leaves the clinic.  If you see signs of a severe allergic reaction (hives, swelling of the face and throat, difficulty breathing, a fast heartbeat, dizziness, or weakness), call 9-1-1 and get the person to the nearest hospital.    For other signs that concern you, call your health care provider. Adverse reactions should be reported to the Vaccine Adverse Event Reporting System (VAERS). Your health care provider will usually file this report, or you can do it yourself. Visit the VAERS website at www.vaers. hhs.gov or call 3-897.857.1662. VAERS is only for reporting reactions, and VAERS staff members do not give medical advice. 6. The National Vaccine Injury Compensation Program    The [facility] Vaccine Injury Compensation Program (VICP) is a federal program that was created to compensate people who may have been injured by certain vaccines. Claims regarding alleged injury or death due to vaccination have a time limit for filing, which may be as short as two years. Visit the VICP website at www.[facility].gov/vaccinecompensation or call 4-115.295.3668 to learn about the program and about filing a claim. 7. How can I learn more?  Ask your health care provider.  Call your local or state health department.  Visit the website of the Food and Drug Administration (FDA) for vaccine package inserts and additional information at www.fda.gov/vaccines-blood-biologics/vaccines.  Contact the Centers for Disease Control and Prevention (CDC):  - Call 3-874.727.1293 (1-800-CDC-INFO) or  - Visit CDCs website at www.cdc.gov/vaccines. Vaccine Information Statement   Tdap (Tetanus, Diphtheria, Pertussis) Vaccine  8/6/2021  [address] [place] [address] 416OC-10   Department of Health and Human Services  Centers for Disease Control and Prevention    Office Use Only

## 2022-03-14 NOTE — PROGRESS NOTES
Thierry Meadows  Identified pt with two pt identifiers(name and ). Chief Complaint   Patient presents with    Thyroid Problem     Room 2C //     Immunization/Injection       Reviewed record In preparation for visit and have obtained necessary documentation. 1. Have you been to the ER, urgent care clinic or hospitalized since your last visit? No     2. Have you seen or consulted any other health care providers outside of the [address] [place] [address] since your last visit? Include any pap smears or colon screening. No    Patient has an advance directive. Vitals reviewed with provider. Health Maintenance reviewed: After verbal order read back of Dr. Wilbur Martinez, patient received Flu Shot in left deltoid. [address]: 88811-326-09 Lot: [id number] Exp 22. Patient tolerated procedure without complaints and received VIS. After verbal order read back of  Dr. Wilbur Martinez, patient received TDAP  in right arm. [facility] 0.[facility]ml [facility]  80542-898-09 lot [id number] exp 2023. Patient tolerated procedure without complaints and received VIS.        Health Maintenance Due   Topic    Hepatitis C Screening     Cervical cancer screen     Colorectal Cancer Screening Combo     Low dose CT lung screening     Breast Cancer Screen Mammogram     Flu Vaccine (1)    Depression Screen           Wt Readings from Last 3 Encounters:   22 157 lb 12.8 oz (71.6 kg)   20 152 lb (68.9 kg)   19 153 lb (69.4 kg)        Temp Readings from Last 3 Encounters:   22 98.1 °F (36.7 °C) (Oral)   10/11/21 97.9 °F (36.6 °C)   20 98.4 °F (36.9 °C)        BP Readings from Last 3 Encounters:   22 124/77   20 129/67   19 119/80        Pulse Readings from Last 3 Encounters:   22 76   10/11/21 88   20 65        Vitals:    22 1323   BP: 124/77   Pulse: 76   Resp: 16   Temp: 98.1 °F (36.7 °C)   TempSrc: Oral   SpO2: 95%   Weight: 157 lb 12.8 oz (71.6 kg)   Height: 5' 4\" (1.626 m)   PainSc:   0 - No pain          Learning Assessment:   :       Learning Assessment 8/29/2014   PRIMARY LEARNER Patient   HIGHEST LEVEL OF EDUCATION - PRIMARY LEARNER  DID NOT GRADUATE HIGH SCHOOL   BARRIERS PRIMARY LEARNER NONE   PRIMARY LANGUAGE ENGLISH    NEED No   LEARNER PREFERENCE PRIMARY DEMONSTRATION   ANSWERED BY patient   RELATIONSHIP SELF        Depression Screening:   :       3 most recent PHQ Screens 3/14/2022   Little interest or pleasure in doing things Nearly every day   Feeling down, depressed, irritable, or hopeless Nearly every day   Total Score PHQ 2 6   Trouble falling or staying asleep, or sleeping too much Several days   Feeling tired or having little energy Nearly every day   Poor appetite, weight loss, or overeating Not at all   Feeling bad about yourself - or that you are a failure or have let yourself or your family down Not at all   Trouble concentrating on things such as school, work, reading, or watching TV Nearly every day   Moving or speaking so slowly that other people could have noticed; or the opposite being so fidgety that others notice Nearly every day   Thoughts of being better off dead, or hurting yourself in some way Not at all   PHQ 9 Score 16   How difficult have these problems made it for you to do your work, take care of your home and get along with others -        Fall Risk Assessment:   :     No flowsheet data found. Abuse Screening:   :       Abuse Screening Questionnaire 10/29/2018   Do you ever feel afraid of your partner? N   Are you in a relationship with someone who physically or mentally threatens you? N   Is it safe for you to go home?  Y        ADL Screening:   :       ADL Assessment 3/17/2015   Feeding yourself No Help Needed   Getting from bed to chair No Help Needed   Getting dressed No Help Needed   Bathing or showering No Help Needed   Walk across the room (includes cane/walker) No Help Needed   Using the telphone No Help Needed Taking your medications No Help Needed   Preparing meals No Help Needed   Managing money (expenses/bills) No Help Needed   Moderately strenuous housework (laundry) No Help Needed   Shopping for personal items (toiletries/medicines) No Help Needed   Shopping for groceries No Help Needed   Driving No Help Needed   Climbing a flight of stairs No Help Needed   Getting to places beyond walking distances No Help Needed

## 2022-03-18 PROBLEM — R20.0 NUMBNESS IN BOTH HANDS: Status: ACTIVE | Noted: 2017-03-27

## 2022-03-19 PROBLEM — M79.2 NEUROPATHIC PAIN: Status: ACTIVE | Noted: 2018-03-19

## 2022-03-19 PROBLEM — M48.02 SPINAL STENOSIS IN CERVICAL REGION: Status: ACTIVE | Noted: 2018-03-19

## 2022-03-22 ENCOUNTER — TELEPHONE (OUTPATIENT)
Dept: INTERNAL MEDICINE CLINIC | Age: 52
End: 2022-03-22

## 2022-04-05 LAB
ALBUMIN SERPL-MCNC: 4.3 G/DL (ref 3.8–4.9)
ALBUMIN/GLOB SERPL: 1.6 {RATIO} (ref 1.2–2.2)
ALP SERPL-CCNC: 110 IU/L (ref 44–121)
ALT SERPL-CCNC: 10 IU/L (ref 0–32)
AST SERPL-CCNC: 17 IU/L (ref 0–40)
BASOPHILS # BLD AUTO: 0.1 X10E3/UL (ref 0–0.2)
BASOPHILS NFR BLD AUTO: 1 %
BILIRUB SERPL-MCNC: 0.2 MG/DL (ref 0–1.2)
BUN SERPL-MCNC: 12 MG/DL (ref 6–24)
BUN/CREAT SERPL: 19 (ref 9–23)
CALCIUM SERPL-MCNC: 8.9 MG/DL (ref 8.7–10.2)
CHLORIDE SERPL-SCNC: 100 MMOL/L (ref 96–106)
CHOLEST SERPL-MCNC: 178 MG/DL (ref 100–199)
CO2 SERPL-SCNC: 23 MMOL/L (ref 20–29)
CREAT SERPL-MCNC: 0.63 MG/DL (ref 0.57–1)
EGFR: 107 ML/MIN/1.73
EOSINOPHIL # BLD AUTO: 0.2 X10E3/UL (ref 0–0.4)
EOSINOPHIL NFR BLD AUTO: 2 %
ERYTHROCYTE [DISTWIDTH] IN BLOOD BY AUTOMATED COUNT: 11.9 % (ref 11.7–15.4)
GLOBULIN SER CALC-MCNC: 2.7 G/DL (ref 1.5–4.5)
GLUCOSE SERPL-MCNC: 79 MG/DL (ref 65–99)
HCT VFR BLD AUTO: 39.5 % (ref 34–46.6)
HCV AB S/CO SERPL IA: <0.1 S/CO RATIO (ref 0–0.9)
HCV AB SERPL QL IA: NORMAL
HDLC SERPL-MCNC: 36 MG/DL
HGB BLD-MCNC: 12.8 G/DL (ref 11.1–15.9)
IMM GRANULOCYTES # BLD AUTO: 0 X10E3/UL (ref 0–0.1)
IMM GRANULOCYTES NFR BLD AUTO: 0 %
LDLC SERPL CALC-MCNC: 125 MG/DL (ref 0–99)
LYMPHOCYTES # BLD AUTO: 1.8 X10E3/UL (ref 0.7–3.1)
LYMPHOCYTES NFR BLD AUTO: 19 %
MCH RBC QN AUTO: 32.2 PG (ref 26.6–33)
MCHC RBC AUTO-ENTMCNC: 32.4 G/DL (ref 31.5–35.7)
MCV RBC AUTO: 99 FL (ref 79–97)
MONOCYTES # BLD AUTO: 0.7 X10E3/UL (ref 0.1–0.9)
MONOCYTES NFR BLD AUTO: 7 %
NEUTROPHILS # BLD AUTO: 6.8 X10E3/UL (ref 1.4–7)
NEUTROPHILS NFR BLD AUTO: 71 %
PLATELET # BLD AUTO: 309 X10E3/UL (ref 150–450)
POTASSIUM SERPL-SCNC: 4.3 MMOL/L (ref 3.5–5.2)
PROT SERPL-MCNC: 7 G/DL (ref 6–8.5)
RBC # BLD AUTO: 3.98 X10E6/UL (ref 3.77–5.28)
SODIUM SERPL-SCNC: 139 MMOL/L (ref 134–144)
T4 FREE SERPL-MCNC: 1.61 NG/DL (ref 0.82–1.77)
TRIGL SERPL-MCNC: 90 MG/DL (ref 0–149)
TSH SERPL DL<=0.005 MIU/L-ACNC: 3.64 UIU/ML (ref 0.45–4.5)
VLDLC SERPL CALC-MCNC: 17 MG/DL (ref 5–40)
WBC # BLD AUTO: 9.6 X10E3/UL (ref 3.4–10.8)

## 2022-04-13 DIAGNOSIS — F41.1 GENERALIZED ANXIETY DISORDER: ICD-10-CM

## 2022-04-13 RX ORDER — BUPROPION HYDROCHLORIDE 150 MG/1
150 TABLET ORAL
Qty: 30 TABLET | Refills: 2 | Status: SHIPPED | OUTPATIENT
Start: 2022-04-13 | End: 2022-07-18

## 2022-04-13 NOTE — TELEPHONE ENCOUNTER
PCP: Charlie Castillo MD     Last appt: 3/14/2022   Future Appointments   Date Time Provider Chuy You   5/23/2022  2:30 PM Charlie Castillo MD [facility] BS AMB        Requested Prescriptions     Pending Prescriptions Disp Refills    buPROPion XL (WELLBUTRIN XL) 150 mg tablet 30 Tablet 2     Sig: Take 1 Tablet by mouth every morning.

## 2022-04-13 NOTE — TELEPHONE ENCOUNTER
Requested Prescriptions     Pending Prescriptions Disp Refills    buPROPion XL (WELLBUTRIN XL) 150 mg tablet 30 Tablet 2     Sig: Take 1 Tablet by mouth every morning.

## 2022-04-19 DIAGNOSIS — E03.9 ACQUIRED HYPOTHYROIDISM: ICD-10-CM

## 2022-04-19 RX ORDER — LEVOTHYROXINE SODIUM 150 UG/1
TABLET ORAL
Qty: 90 TABLET | Refills: 1 | Status: SHIPPED | OUTPATIENT
Start: 2022-04-19 | End: 2022-10-19

## 2022-04-19 NOTE — TELEPHONE ENCOUNTER
PCP: Ricardo Porras MD     Last appt: 3/14/2022   Future Appointments   Date Time Provider Chuy You   5/23/2022  2:30 PM Ricardo Porras MD [facility] BS AMB        Requested Prescriptions     Pending Prescriptions Disp Refills    levothyroxine (SYNTHROID) 150 mcg tablet 90 Tablet 0

## 2022-04-19 NOTE — TELEPHONE ENCOUNTER
Requested Prescriptions     Pending Prescriptions Disp Refills    levothyroxine (SYNTHROID) 150 mcg tablet 90 Tablet 0

## 2022-07-18 ENCOUNTER — OFFICE VISIT (OUTPATIENT)
Dept: INTERNAL MEDICINE CLINIC | Age: 52
End: 2022-07-18
Payer: COMMERCIAL

## 2022-07-18 VITALS
RESPIRATION RATE: 16 BRPM | BODY MASS INDEX: 25.95 KG/M2 | HEIGHT: 64 IN | OXYGEN SATURATION: 97 % | SYSTOLIC BLOOD PRESSURE: 130 MMHG | TEMPERATURE: 98.3 F | DIASTOLIC BLOOD PRESSURE: 70 MMHG | WEIGHT: 152 LBS | HEART RATE: 76 BPM

## 2022-07-18 DIAGNOSIS — F17.200 SMOKER: ICD-10-CM

## 2022-07-18 DIAGNOSIS — Z12.4 CERVICAL CANCER SCREENING: ICD-10-CM

## 2022-07-18 DIAGNOSIS — F41.9 ANXIETY: ICD-10-CM

## 2022-07-18 DIAGNOSIS — E03.9 ACQUIRED HYPOTHYROIDISM: Primary | ICD-10-CM

## 2022-07-18 DIAGNOSIS — R07.9 CHEST PAIN, UNSPECIFIED TYPE: ICD-10-CM

## 2022-07-18 DIAGNOSIS — J44.9 CHRONIC OBSTRUCTIVE PULMONARY DISEASE, UNSPECIFIED COPD TYPE (HCC): ICD-10-CM

## 2022-07-18 PROCEDURE — 99214 OFFICE O/P EST MOD 30 MIN: CPT | Performed by: INTERNAL MEDICINE

## 2022-07-18 RX ORDER — BUSPIRONE HYDROCHLORIDE 5 MG/1
5 TABLET ORAL 2 TIMES DAILY
Qty: 60 TABLET | Refills: 2 | Status: SHIPPED | OUTPATIENT
Start: 2022-07-18 | End: 2022-07-26 | Stop reason: SDUPTHER

## 2022-07-18 NOTE — PROGRESS NOTES
HPI  Ms. Karime Sutherland is a 46y.o. year old female, she is seen today for follow up hypothyroidism, anxiety. Last visit added wellbutrin for worsening mood. Says mood is okay - same. Says she had loss of taste with wellbutrin after taking for a week, so she stopped it and taste came back. Mood - \"fairly okay\" - no SI, feels anxious a lot, sometimes down but anxiety is worse than feeling down    Intermittent rib pain under right breast, worse when breathing heavier such as in humidity. Sputum has been thicker as well. Is compliant with symbicort daily, sometimes uses bid and that helps some with pain under ribs. No f/c or sweats. Chief Complaint   Patient presents with    Thyroid Problem     Room 2A //     Medication Evaluation        Prior to Admission medications    Medication Sig Start Date End Date Taking? Authorizing Provider   busPIRone (BUSPAR) 5 mg tablet Take 1 Tablet by mouth two (2) times a day. 7/18/22  Yes Yong Lara MD   levothyroxine (SYNTHROID) 150 mcg tablet TAKE 1 TABLET BY MOUTH 6 DAYS PER WEEK BEFORE BREAKFAST 4/19/22  Yes Yong Lara MD   PARoxetine (PAXIL) 40 mg tablet Take 1 Tablet by mouth daily. 3/14/22  Yes Yong Lara MD   Symbicort 160-4.5 mcg/actuation HFAA TAKE 2 PUFFS BY INHALATION TWO (2) TIMES A DAY. 10/18/21  Yes Yong Lara MD   cyclobenzaprine (FLEXERIL) 10 mg tablet Take 1 Tablet by mouth two (2) times daily as needed for Muscle Spasm(s). 7/29/21  Yes Yong Lara MD   albuterol (PROVENTIL HFA, VENTOLIN HFA, PROAIR HFA) 90 mcg/actuation inhaler Take 1 Puff by inhalation every four (4) hours as needed for Wheezing. 3/3/20  Yes Yong Lara MD   albuterol-ipratropium (DUO-NEB) 2.5 mg-0.5 mg/3 ml nebu 3 mL by Nebulization route every four (4) hours as needed for Wheezing or Shortness of Breath. 3/3/20  Yes Yong Lara MD   fluticasone (FLONASE) 50 mcg/actuation nasal spray 2 Sprays by Both Nostrils route daily. 2/27/19  Yes Lior Camacho MD   ibuprofen (MOTRIN IB) 200 mg tablet Take 200 mg by mouth as needed. Yes Provider, Historical   acetaminophen (TYLENOL) 325 mg tablet Take  by mouth every four (4) hours as needed for Pain. Yes Provider, Historical   loratadine (CLARITIN) 10 mg tablet Take 10 mg by mouth as needed. Yes Provider, Historical         Allergies   Allergen Reactions    Hydrocodone Hives         REVIEW OF SYSTEMS:  Per HPI    PHYSICAL EXAM:  Visit Vitals  /70   Pulse 76   Temp 98.3 °F (36.8 °C) (Oral)   Resp 16   Ht 5' 4\" (1.626 m)   Wt 152 lb (68.9 kg)   SpO2 97%   BMI 26.09 kg/m²     Constitutional: Appears well-developed and well-nourished. No distress. HENT:   Head: Normocephalic and atraumatic. Eyes: No scleral icterus. Cardiovascular: Normal S1/S2, regular rhythm. No murmurs, rubs, or gallops. Pulmonary/Chest: Effort normal and breath sounds normal. No respiratory distress. No wheezes, rhonchi, or rales. Ext: No edema. Neurological: Alert. Psychiatric: Normal mood and affect. Behavior is normal.     Lab Results   Component Value Date/Time    Sodium 139 04/04/2022 10:21 AM    Potassium 4.3 04/04/2022 10:21 AM    Chloride 100 04/04/2022 10:21 AM    CO2 23 04/04/2022 10:21 AM    Anion gap 9 05/02/2017 11:30 AM    Glucose 79 04/04/2022 10:21 AM    BUN 12 04/04/2022 10:21 AM    Creatinine 0.63 04/04/2022 10:21 AM    BUN/Creatinine ratio 19 04/04/2022 10:21 AM    GFR est  03/29/2021 11:33 AM    GFR est non- 03/29/2021 11:33 AM    Calcium 8.9 04/04/2022 10:21 AM    Bilirubin, total 0.2 04/04/2022 10:21 AM    Alk.  phosphatase 110 04/04/2022 10:21 AM    Protein, total 7.0 04/04/2022 10:21 AM    Albumin 4.3 04/04/2022 10:21 AM    Globulin 3.3 05/02/2017 11:30 AM    A-G Ratio 1.6 04/04/2022 10:21 AM    ALT (SGPT) 10 04/04/2022 10:21 AM     Lab Results   Component Value Date/Time    Hemoglobin A1c 5.5 05/02/2017 11:30 AM      Lab Results   Component Value Date/Time Cholesterol, total 178 04/04/2022 10:21 AM    HDL Cholesterol 36 (L) 04/04/2022 10:21 AM    LDL, calculated 125 (H) 04/04/2022 10:21 AM    LDL, calculated 115 (H) 07/02/2018 10:13 AM    VLDL, calculated 17 04/04/2022 10:21 AM    VLDL, calculated 27 07/02/2018 10:13 AM    Triglyceride 90 04/04/2022 10:21 AM      Lab Results   Component Value Date/Time    TSH 3.640 04/04/2022 10:21 AM    T4, Free 1.61 04/04/2022 10:21 AM        ASSESSMENT/PLAN  Diagnoses and all orders for this visit:    1. Acquired hypothyroidism    2. Cervical cancer screening  -     REFERRAL TO GYNECOLOGY    3. Chest pain, unspecified type  -     XR CHEST PA LAT; Future    4. Chronic obstructive pulmonary disease, unspecified COPD type ([facility] [place] [address]  -     XR CHEST PA LAT; Future    5. Smoker  -     XR CHEST PA LAT; Future    6. Anxiety  -     busPIRone (BUSPAR) 5 mg tablet; Take 1 Tablet by mouth two (2) times a day. Recent TFT's wnl - continue current dose of levothyroxine  Anxiety not controlled, add buspar  With cough, chest pain, smoking get CXR  encouraged smoking cessation     Health Maintenance Due   Topic Date Due    Cervical cancer screen  Never done    Pneumococcal 0-64 years (2 - PCV) 03/10/2011    Colorectal Cancer Screening Combo  09/20/2016    Low dose CT lung screening  Never done    Breast Cancer Screen Mammogram  Never done        Follow-up and Dispositions    Return in about 3 months (around 10/18/2022), or if symptoms worsen or fail to improve, for anxiety. Reviewed plan of care. Patient has provided input and agrees with goals. The nurse provided the patient and/or family with advanced directive information if needed and encouraged the patient to provide a copy to the office when available.

## 2022-07-18 NOTE — PROGRESS NOTES
Parvez Palumbo  Identified pt with two pt identifiers(name and ). Chief Complaint   Patient presents with    Thyroid Problem     Room 2A //     Medication Evaluation       Reviewed record In preparation for visit and have obtained necessary documentation. 1. Have you been to the ER, urgent care clinic or hospitalized since your last visit? No     2. Have you seen or consulted any other health care providers outside of the [address] [place] [address] since your last visit? Include any pap smears or colon screening. No    Patient does not have an advance directive. Vitals reviewed with provider.     Health Maintenance reviewed:     Health Maintenance Due   Topic    COVID-19 Vaccine (1)    Cervical cancer screen     Pneumococcal 0-64 years (2 - PCV)    Colorectal Cancer Screening Combo     Low dose CT lung screening     Breast Cancer Screen Mammogram         Wt Readings from Last 3 Encounters:   22 152 lb (68.9 kg)   22 157 lb 12.8 oz (71.6 kg)   20 152 lb (68.9 kg)      Temp Readings from Last 3 Encounters:   22 98.1 °F (36.7 °C) (Oral)   10/11/21 97.9 °F (36.6 °C)   20 98.4 °F (36.9 °C)      BP Readings from Last 3 Encounters:   22 124/77   20 129/67   19 119/80      Pulse Readings from Last 3 Encounters:   22 76   10/11/21 88   20 65      Vitals:    22 1046   Resp: 16   Weight: 152 lb (68.9 kg)   Height: 5' 4\" (1.626 m)   PainSc:   0 - No pain          Learning Assessment:   :     Learning Assessment 2014   PRIMARY LEARNER Patient   HIGHEST LEVEL OF EDUCATION - PRIMARY LEARNER  DID NOT GRADUATE HIGH SCHOOL   BARRIERS PRIMARY LEARNER NONE   PRIMARY LANGUAGE ENGLISH    NEED No   LEARNER PREFERENCE PRIMARY DEMONSTRATION   ANSWERED BY patient   RELATIONSHIP SELF        Depression Screening:   :     3 most recent PHQ Screens 3/14/2022   Little interest or pleasure in doing things Nearly every day   Feeling down, depressed, irritable, or hopeless Nearly every day   Total Score PHQ 2 6   Trouble falling or staying asleep, or sleeping too much Several days   Feeling tired or having little energy Nearly every day   Poor appetite, weight loss, or overeating Not at all   Feeling bad about yourself - or that you are a failure or have let yourself or your family down Not at all   Trouble concentrating on things such as school, work, reading, or watching TV Nearly every day   Moving or speaking so slowly that other people could have noticed; or the opposite being so fidgety that others notice Nearly every day   Thoughts of being better off dead, or hurting yourself in some way Not at all   PHQ 9 Score 16   How difficult have these problems made it for you to do your work, take care of your home and get along with others -        Fall Risk Assessment:   :     No flowsheet data found. Abuse Screening:   :     Abuse Screening Questionnaire 10/29/2018   Do you ever feel afraid of your partner? N   Are you in a relationship with someone who physically or mentally threatens you? N   Is it safe for you to go home?  Y        ADL Screening:   :     ADL Assessment 3/17/2015   Feeding yourself No Help Needed   Getting from bed to chair No Help Needed   Getting dressed No Help Needed   Bathing or showering No Help Needed   Walk across the room (includes cane/walker) No Help Needed   Using the telphone No Help Needed   Taking your medications No Help Needed   Preparing meals No Help Needed   Managing money (expenses/bills) No Help Needed   Moderately strenuous housework (laundry) No Help Needed   Shopping for personal items (toiletries/medicines) No Help Needed   Shopping for groceries No Help Needed   Driving No Help Needed   Climbing a flight of stairs No Help Needed   Getting to places beyond walking distances No Help Needed

## 2022-07-20 ENCOUNTER — TELEPHONE (OUTPATIENT)
Dept: INTERNAL MEDICINE CLINIC | Age: 52
End: 2022-07-20

## 2022-07-20 NOTE — TELEPHONE ENCOUNTER
Pt was out in the yard yesterday and was stung by 2 bees/wasp. Shortly there after started with itching all over, eyes itchy, heaviness in chest, face red and puffy. She went to [facility]  and was given an epipen. Pt would like to have one to keep with, uses [employer].

## 2022-07-20 NOTE — TELEPHONE ENCOUNTER
Patient called in. .    States she was stung by a bee over the weekend, went to urgent care and was given a Epi Injection. States that really worked. Patient would like to get a prescription for a EpiPen to have at home.      [phone]

## 2022-07-21 ENCOUNTER — TELEPHONE (OUTPATIENT)
Dept: INTERNAL MEDICINE CLINIC | Age: 52
End: 2022-07-21

## 2022-07-21 RX ORDER — EPINEPHRINE 0.3 MG/.3ML
0.3 INJECTION SUBCUTANEOUS
Qty: 1 EACH | Refills: 3 | Status: SHIPPED | OUTPATIENT
Start: 2022-07-21 | End: 2022-07-21

## 2022-07-25 ENCOUNTER — HOSPITAL ENCOUNTER (OUTPATIENT)
Dept: GENERAL RADIOLOGY | Age: 52
Discharge: HOME | End: 2022-07-25
Payer: COMMERCIAL

## 2022-07-25 DIAGNOSIS — F17.200 SMOKER: ICD-10-CM

## 2022-07-25 DIAGNOSIS — J44.9 CHRONIC OBSTRUCTIVE PULMONARY DISEASE, UNSPECIFIED COPD TYPE (HCC): ICD-10-CM

## 2022-07-25 DIAGNOSIS — R07.9 CHEST PAIN, UNSPECIFIED TYPE: ICD-10-CM

## 2022-07-25 PROCEDURE — 71046 X-RAY EXAM CHEST 2 VIEWS: CPT

## 2022-07-26 DIAGNOSIS — F41.9 ANXIETY: ICD-10-CM

## 2022-07-26 RX ORDER — BUSPIRONE HYDROCHLORIDE 5 MG/1
5 TABLET ORAL 2 TIMES DAILY
Qty: 180 TABLET | Refills: 0 | Status: SHIPPED | OUTPATIENT
Start: 2022-07-26

## 2022-07-26 NOTE — TELEPHONE ENCOUNTER
Requested Prescriptions     Pending Prescriptions Disp Refills    busPIRone (BUSPAR) 5 mg tablet 60 Tablet 2     Sig: Take 1 Tablet by mouth two (2) times a day. Pharmacy needs 90  days of RX.

## 2022-07-29 ENCOUNTER — TELEPHONE (OUTPATIENT)
Dept: INTERNAL MEDICINE CLINIC | Age: 52
End: 2022-07-29

## 2022-07-29 DIAGNOSIS — E03.9 ACQUIRED HYPOTHYROIDISM: Primary | ICD-10-CM

## 2022-07-29 NOTE — TELEPHONE ENCOUNTER
I left a detailed voicemail informing her that labs have been ordered, will be mailed and she can get them done 1 week before November appointment.

## 2022-07-29 NOTE — TELEPHONE ENCOUNTER
----- Message from Gisel Talley sent at 7/29/2022  9:31 AM EDT -----  Subject: Message to Provider    QUESTIONS  Information for Provider? Pt sees Teagan Russell at [facility] Called   today 7/29/22 to inquire about blood work PCP usually has her do in   between appts. No orders in system. Please call pt back to let her know. Next appt is 11/7/22  ---------------------------------------------------------------------------  --------------  July PECK  [phone]; OK to leave message on voicemail  ---------------------------------------------------------------------------  --------------  SCRIPT ANSWERS  Relationship to Patient?  Self

## 2022-08-25 RX ORDER — BUDESONIDE AND FORMOTEROL FUMARATE DIHYDRATE 160; 4.5 UG/1; UG/1
AEROSOL RESPIRATORY (INHALATION)
Qty: 30.6 EACH | Refills: 3 | Status: SHIPPED | OUTPATIENT
Start: 2022-08-25

## 2022-09-15 DIAGNOSIS — F41.9 ANXIETY: ICD-10-CM

## 2022-09-15 RX ORDER — CYCLOBENZAPRINE HCL 10 MG
10 TABLET ORAL
Qty: 20 TABLET | Refills: 0 | Status: SHIPPED | OUTPATIENT
Start: 2022-09-15

## 2022-09-15 RX ORDER — PAROXETINE HYDROCHLORIDE 40 MG/1
TABLET, FILM COATED ORAL
Qty: 90 TABLET | Refills: 0 | Status: SHIPPED | OUTPATIENT
Start: 2022-09-15

## 2022-09-15 NOTE — TELEPHONE ENCOUNTER
PCP: Akilah Duque MD     Last appt: 7/18/2022     Future Appointments   Date Time Provider Chuy You   10/31/2022 10:30 AM MD BEN Orourke   11/7/2022 11:30 AM MD [facility] Orourke AMB          Requested Prescriptions     Pending Prescriptions Disp Refills    cyclobenzaprine (FLEXERIL) 10 mg tablet 20 Tablet 0     Sig: Take 1 Tablet by mouth two (2) times daily as needed for Muscle Spasm(s).

## 2022-09-15 NOTE — TELEPHONE ENCOUNTER
Patient called and wanted to see if Dr Javier Delong could send in a muscle relaxer in for her lower back hip and leg

## 2022-10-19 DIAGNOSIS — E03.9 ACQUIRED HYPOTHYROIDISM: ICD-10-CM

## 2022-10-19 RX ORDER — LEVOTHYROXINE SODIUM 150 UG/1
TABLET ORAL
Qty: 90 TABLET | Refills: 1 | Status: SHIPPED | OUTPATIENT
Start: 2022-10-19

## 2022-12-11 DIAGNOSIS — F41.9 ANXIETY: ICD-10-CM

## 2022-12-11 RX ORDER — PAROXETINE HYDROCHLORIDE 40 MG/1
TABLET, FILM COATED ORAL
Qty: 90 TABLET | Refills: 0 | Status: SHIPPED | OUTPATIENT
Start: 2022-12-11

## 2022-12-28 ENCOUNTER — OFFICE VISIT (OUTPATIENT)
Dept: PRIMARY CARE CLINIC | Age: 52
End: 2022-12-28
Payer: COMMERCIAL

## 2022-12-28 VITALS
TEMPERATURE: 97.4 F | SYSTOLIC BLOOD PRESSURE: 122 MMHG | OXYGEN SATURATION: 94 % | WEIGHT: 149.8 LBS | HEIGHT: 64 IN | HEART RATE: 88 BPM | RESPIRATION RATE: 16 BRPM | BODY MASS INDEX: 25.57 KG/M2 | DIASTOLIC BLOOD PRESSURE: 83 MMHG

## 2022-12-28 DIAGNOSIS — R30.0 DYSURIA: ICD-10-CM

## 2022-12-28 DIAGNOSIS — R68.89 FLU-LIKE SYMPTOMS: Primary | ICD-10-CM

## 2022-12-28 LAB
BILIRUB UR QL STRIP: NEGATIVE
FLUAV+FLUBV AG NOSE QL IA.RAPID: NEGATIVE
FLUAV+FLUBV AG NOSE QL IA.RAPID: NEGATIVE
GLUCOSE UR-MCNC: NEGATIVE MG/DL
KETONES P FAST UR STRIP-MCNC: NEGATIVE MG/DL
PH UR STRIP: 5.5 [PH] (ref 4.6–8)
PROT UR QL STRIP: NEGATIVE
SARS-COV-2 PCR, POC: NEGATIVE
SP GR UR STRIP: 1.01 (ref 1–1.03)
UA UROBILINOGEN AMB POC: NORMAL (ref 0.2–1)
URINALYSIS CLARITY POC: CLEAR
URINALYSIS COLOR POC: YELLOW
URINE BLOOD POC: NEGATIVE
URINE LEUKOCYTES POC: NEGATIVE
URINE NITRITES POC: NEGATIVE
VALID INTERNAL CONTROL?: YES

## 2022-12-28 PROCEDURE — 81002 URINALYSIS NONAUTO W/O SCOPE: CPT

## 2022-12-28 PROCEDURE — 87804 INFLUENZA ASSAY W/OPTIC: CPT

## 2022-12-28 PROCEDURE — 87635 SARS-COV-2 COVID-19 AMP PRB: CPT

## 2022-12-28 PROCEDURE — 99204 OFFICE O/P NEW MOD 45 MIN: CPT

## 2022-12-28 RX ORDER — SULFAMETHOXAZOLE AND TRIMETHOPRIM 800; 160 MG/1; MG/1
1 TABLET ORAL 2 TIMES DAILY
Qty: 20 TABLET | Refills: 0 | Status: SHIPPED | OUTPATIENT
Start: 2022-12-28 | End: 2023-01-07

## 2022-12-28 NOTE — PROGRESS NOTES
Identified pt with two pt identifiers(name and ). Reviewed record in preparation for visit and have obtained necessary documentation. Chief Complaint   Patient presents with    Cold Symptoms     Started Friday; cough/congestion/headache/right ear pressure and pain/body aches; no home covid; taking mucinex    Abdominal Pain     X2 days; lower abd; increased frequency of urination/slight pain with urination      Vitals:    22 1117   BP: 122/83   Pulse: 88   Resp: 16   Temp: 97.4 °F (36.3 °C)   TempSrc: Temporal   SpO2: 94%   Weight: 149 lb 12.8 oz (67.9 kg)   Height: 5' 4\" (1.626 m)   PainSc:   4   PainLoc: Ear       Health Maintenance Review: Patient reminded of \"due or due soon\" health maintenance. I have asked the patient to contact his/her primary care provider (PCP) for follow-up on his/her health maintenance. Coordination of Care Questionnaire:  :   1) Have you been to an emergency room, urgent care, or hospitalized since your last visit? If yes, where when, and reason for visit? no       2. Have seen or consulted any other health care provider since your last visit? If yes, where when, and reason for visit? NO      Patient is accompanied by self I have received verbal consent from Nelson Lynch to discuss any/all medical information while they are present in the room.

## 2022-12-28 NOTE — PROGRESS NOTES
Chief Complaint   Patient presents with    Cold Symptoms     Started Friday; cough/congestion/headache/right ear pressure and pain/body aches; no home covid; taking mucinex    Abdominal Pain     X2 days; lower abd; increased frequency of urination/slight pain with urination     HISTORY OF PRESENT ILLNESS  Tamika Armando is a 46 y.o. female. Patient reports right ear pain with sinus pressure in addition pain when urinating. Sinus pain began 6 days ago that has worsened. Patient has tried mucinex, ibuprofen with mild improvement. Last sinus infection last year and reports this feeling like it. Reports family members are sick unsure of what they have. Urinary symptoms began 2 days ago with lower back right side pain, lower abd pain and urinary discoloration. HX of UTI this feels like it. Denies vaginal discharge, new sexual partners or concerns for STD. Denies fever. Review of Systems   Constitutional:  Negative for chills and fever. HENT:  Positive for congestion, ear pain (right ear) and sinus pain. Negative for sore throat. Eyes: Negative. Respiratory:  Negative for shortness of breath. Cardiovascular: Negative. Gastrointestinal: Negative. Genitourinary:  Positive for dysuria, flank pain, frequency and urgency. Skin: Negative. Neurological:  Negative for loss of consciousness. Endo/Heme/Allergies: Negative. Psychiatric/Behavioral: Negative. Physical Exam  Constitutional:       Appearance: Normal appearance. HENT:      Head: Normocephalic. Right Ear: Tympanic membrane normal.      Left Ear: Tympanic membrane normal.      Ears:        Nose: Nose normal.      Mouth/Throat:      Mouth: Mucous membranes are moist.      Pharynx: Oropharynx is clear. Eyes:      Pupils: Pupils are equal, round, and reactive to light. Cardiovascular:      Rate and Rhythm: Normal rate. Pulses: Normal pulses. Heart sounds: Normal heart sounds.    Pulmonary:      Effort: Pulmonary effort is normal.      Breath sounds: Normal breath sounds. Abdominal:      Palpations: Abdomen is soft. Tenderness: There is no right CVA tenderness or left CVA tenderness. Musculoskeletal:         General: Normal range of motion. Cervical back: Normal range of motion. Skin:     General: Skin is warm. Neurological:      General: No focal deficit present. Mental Status: She is alert and oriented to person, place, and time.    Psychiatric:         Mood and Affect: Mood normal.         Behavior: Behavior normal.     Past Medical History:   Diagnosis Date    Allergic rhinitis     COPD (chronic obstructive pulmonary disease) (HCC)     mild    Generalized anxiety disorder     Hypothyroidism     Psychiatric disorder      Past Surgical History:   Procedure Laterality Date    HX ORTHOPAEDIC      right wrist tendon    HX OTHER SURGICAL      colonoscopy    HX TUBAL LIGATION       /83 (BP 1 Location: Left arm, BP Patient Position: Sitting)   Pulse 88   Temp 97.4 °F (36.3 °C) (Temporal)   Resp 16   Ht 5' 4\" (1.626 m)   Wt 149 lb 12.8 oz (67.9 kg)   SpO2 94%   BMI 25.71 kg/m²       Results for orders placed or performed in visit on 12/28/22   AMB POC URINALYSIS DIP STICK MANUAL W/O MICRO   Result Value Ref Range    Color (UA POC) Yellow     Clarity (UA POC) Clear     Glucose (UA POC) Negative Negative    Bilirubin (UA POC) Negative Negative    Ketones (UA POC) Negative Negative    Specific gravity (UA POC) 1.010 1.001 - 1.035    Blood (UA POC) Negative Negative    pH (UA POC) 5.5 4.6 - 8.0    Protein (UA POC) Negative Negative    Urobilinogen (UA POC) 0.2 mg/dL 0.2 - 1    Nitrites (UA POC) Negative Negative    Leukocyte esterase (UA POC) Negative Negative   POCT COVID-19, SARS-COV-2, PCR   Result Value Ref Range    SARS-COV-2 PCR, POC Negative Negative   AMB POC RUMA INFLUENZA A/B TEST   Result Value Ref Range    VALID INTERNAL CONTROL POC Yes     Influenza A Ag POC Negative Negative Influenza B Ag POC Negative Negative     ASSESSMENT and PLAN  1. Flu-like symptoms  -     POCT COVID-19, SARS-COV-2, PCR  -     AMB POC RUMA INFLUENZA A/B TEST  -     Discussed restarting her home flonase and claritin regimen to help dry sinus congestions. - Discussed smoking cessation  2. Dysuria  -     AMB POC URINALYSIS DIP STICK MANUAL W/O MICRO  -     CULTURE, URINE; Future - will call patient if results are abnormal   -     trimethoprim-sulfamethoxazole (BACTRIM DS, SEPTRA DS) 160-800 mg per tablet; Take 1 Tablet by mouth two (2) times a day for 10 days. , Normal, Disp-20 Tablet, R-0    Mely Frank NP

## 2023-02-13 ENCOUNTER — OFFICE VISIT (OUTPATIENT)
Dept: INTERNAL MEDICINE CLINIC | Age: 53
End: 2023-02-13
Payer: COMMERCIAL

## 2023-02-13 VITALS
DIASTOLIC BLOOD PRESSURE: 81 MMHG | HEART RATE: 82 BPM | RESPIRATION RATE: 16 BRPM | TEMPERATURE: 98.7 F | SYSTOLIC BLOOD PRESSURE: 118 MMHG | BODY MASS INDEX: 25.1 KG/M2 | HEIGHT: 64 IN | WEIGHT: 147 LBS | OXYGEN SATURATION: 95 %

## 2023-02-13 DIAGNOSIS — F41.8 ANXIETY WITH DEPRESSION: Primary | ICD-10-CM

## 2023-02-13 DIAGNOSIS — N39.46 MIXED STRESS AND URGE URINARY INCONTINENCE: ICD-10-CM

## 2023-02-13 DIAGNOSIS — L50.9 HIVES: ICD-10-CM

## 2023-02-13 DIAGNOSIS — J44.9 CHRONIC OBSTRUCTIVE PULMONARY DISEASE, UNSPECIFIED COPD TYPE (HCC): ICD-10-CM

## 2023-02-13 DIAGNOSIS — J40 BRONCHITIS: ICD-10-CM

## 2023-02-13 DIAGNOSIS — Z72.0 TOBACCO USE: ICD-10-CM

## 2023-02-13 DIAGNOSIS — F41.1 GENERALIZED ANXIETY DISORDER: ICD-10-CM

## 2023-02-13 DIAGNOSIS — E03.9 ACQUIRED HYPOTHYROIDISM: ICD-10-CM

## 2023-02-13 PROCEDURE — 99214 OFFICE O/P EST MOD 30 MIN: CPT | Performed by: INTERNAL MEDICINE

## 2023-02-13 RX ORDER — TRAZODONE HYDROCHLORIDE 50 MG/1
50 TABLET ORAL
Qty: 30 TABLET | Refills: 3 | Status: SHIPPED | OUTPATIENT
Start: 2023-02-13

## 2023-02-13 RX ORDER — METHYLPREDNISOLONE 4 MG/1
TABLET ORAL
Qty: 1 DOSE PACK | Refills: 0 | Status: SHIPPED | OUTPATIENT
Start: 2023-02-13 | End: 2023-02-19

## 2023-02-13 RX ORDER — AMOXICILLIN AND CLAVULANATE POTASSIUM 875; 125 MG/1; MG/1
1 TABLET, FILM COATED ORAL EVERY 12 HOURS
Qty: 20 TABLET | Refills: 0 | Status: SHIPPED | OUTPATIENT
Start: 2023-02-13 | End: 2023-02-23

## 2023-02-13 NOTE — PROGRESS NOTES
Alexandria Stewart  Identified pt with two pt identifiers(name and ). Chief Complaint   Patient presents with    Anxiety       Reviewed record In preparation for visit and have obtained necessary documentation. 1. Have you been to the ER, urgent care clinic or hospitalized since your last visit? No     2. Have you seen or consulted any other health care providers outside of the [address] [place] [address] since your last visit? Include any pap smears or colon screening. No    Vitals reviewed with provider.     Health Maintenance reviewed:     Health Maintenance Due   Topic    COVID-19 Vaccine (1)    Cervical cancer screen     Colorectal Cancer Screening Combo     Low dose CT lung screening     Breast Cancer Screen Mammogram     Flu Vaccine (1)          Wt Readings from Last 3 Encounters:   23 147 lb (66.7 kg)   22 149 lb 12.8 oz (67.9 kg)   22 152 lb (68.9 kg)        Temp Readings from Last 3 Encounters:   23 98.7 °F (37.1 °C)   22 97.4 °F (36.3 °C) (Temporal)   22 98.3 °F (36.8 °C) (Oral)        BP Readings from Last 3 Encounters:   23 118/81   22 122/83   22 130/70        Pulse Readings from Last 3 Encounters:   23 82   22 88   22 76        Vitals:    23 1037   BP: 118/81   Pulse: 82   Resp: 16   Temp: 98.7 °F (37.1 °C)   SpO2: 95%   Weight: 147 lb (66.7 kg)   Height: 5' 4\" (1.626 m)   PainSc:   0 - No pain          Learning Assessment:   :       Learning Assessment 2014   PRIMARY LEARNER Patient   HIGHEST LEVEL OF EDUCATION - PRIMARY LEARNER  DID NOT GRADUATE HIGH SCHOOL   BARRIERS PRIMARY LEARNER NONE   PRIMARY LANGUAGE ENGLISH    NEED No   LEARNER PREFERENCE PRIMARY DEMONSTRATION   ANSWERED BY patient   RELATIONSHIP SELF        Depression Screening:   :       3 most recent PHQ Screens 2022   Little interest or pleasure in doing things Not at all   Feeling down, depressed, irritable, or hopeless Not at all Total Score PHQ 2 0   Trouble falling or staying asleep, or sleeping too much -   Feeling tired or having little energy -   Poor appetite, weight loss, or overeating -   Feeling bad about yourself - or that you are a failure or have let yourself or your family down -   Trouble concentrating on things such as school, work, reading, or watching TV -   Moving or speaking so slowly that other people could have noticed; or the opposite being so fidgety that others notice -   Thoughts of being better off dead, or hurting yourself in some way -   PHQ 9 Score -   How difficult have these problems made it for you to do your work, take care of your home and get along with others -        Fall Risk Assessment:   :     No flowsheet data found. Abuse Screening:   :       Abuse Screening Questionnaire 10/29/2018   Do you ever feel afraid of your partner? N   Are you in a relationship with someone who physically or mentally threatens you? N   Is it safe for you to go home?  Y        ADL Screening:   :       ADL Assessment 3/17/2015   Feeding yourself No Help Needed   Getting from bed to chair No Help Needed   Getting dressed No Help Needed   Bathing or showering No Help Needed   Walk across the room (includes cane/walker) No Help Needed   Using the telphone No Help Needed   Taking your medications No Help Needed   Preparing meals No Help Needed   Managing money (expenses/bills) No Help Needed   Moderately strenuous housework (laundry) No Help Needed   Shopping for personal items (toiletries/medicines) No Help Needed   Shopping for groceries No Help Needed   Driving No Help Needed   Climbing a flight of stairs No Help Needed   Getting to places beyond walking distances No Help Needed

## 2023-02-13 NOTE — PROGRESS NOTES
HPI  Ms. Elizabeth Chino is a 48y.o. year old female, she is seen today for follow up anxiety. Last visit added buspar for anxiety - 7 mos ago. Today:  Tells me couldn't take buspar because it made her feel more anxious. Anxiety is up and down. If anxiety is higher will get more rest but then might feel more down. No SI. Will feel more depressed, less interest in activities. Appetite is up and down. Has had lots of deaths/suicides in her family. Also has trouble sleeping at night, thoughts, uncomfortable at night but able to sleep well in daytime in her bed. Has some aching in left elbow, right hip - off and on - advil helps. Says her weight is up and down. Treated with bactrim x 10 days starting 12/28 for URI - symptoms improved but never resolved. Cough productive of green/yellow/brown sputum, some sob and wheezing. No more sinus pressure or nasal congestion, +runny nose. No f/c. Uses albuterol at least once a day lately. Symbicort bid. Stress/urgency urinary incontinence. Getting worse. Will get hives in her scalp off and one - goes away after benadryl. Feel sore and itchy. No sneezing, difficulty breathing with spells. No lip or tongue swelling. Occurring for the past several months. Has new bee allergy - got stung by wasp over the summer and had facial swelling, itching all over and difficulty breathing - got epi injection at local clinic    Chief Complaint   Patient presents with    Anxiety        Prior to Admission medications    Medication Sig Start Date End Date Taking? Authorizing Provider   traZODone (DESYREL) 50 mg tablet Take 1 Tablet by mouth nightly. 2/13/23  Yes Aliza Caldera MD   methylPREDNISolone (MEDROL DOSEPACK) 4 mg tablet use as directed 2/13/23 2/19/23 Yes Aliza Caldera MD   amoxicillin-clavulanate (AUGMENTIN) 875-125 mg per tablet Take 1 Tablet by mouth every twelve (12) hours for 10 days.  2/13/23 2/23/23 Yes Aliza Caldera MD   PARoxetine (PAXIL) 40 mg tablet TAKE 1 TABLET BY MOUTH EVERY DAY 12/11/22  Yes Myriam Sood MD   levothyroxine (SYNTHROID) 150 mcg tablet TAKE 1 TABLET BY MOUTH 6 DAYS PER WEEK BEFORE BREAKFAST 10/19/22  Yes Myriam Sood MD   cyclobenzaprine (FLEXERIL) 10 mg tablet Take 1 Tablet by mouth two (2) times daily as needed for Muscle Spasm(s). 9/15/22  Yes Myriam Sood MD   Symbicort 160-4.5 mcg/actuation HFAA TAKE 2 PUFFS BY INHALATION TWO (2) TIMES A DAY. 8/25/22  Yes Myriam Sood MD   albuterol (PROVENTIL HFA, VENTOLIN HFA, PROAIR HFA) 90 mcg/actuation inhaler Take 1 Puff by inhalation every four (4) hours as needed for Wheezing. 3/3/20  Yes Myriam Sood MD   albuterol-ipratropium (DUO-NEB) 2.5 mg-0.5 mg/3 ml nebu 3 mL by Nebulization route every four (4) hours as needed for Wheezing or Shortness of Breath. 3/3/20  Yes Myriam Sood MD   fluticasone (FLONASE) 50 mcg/actuation nasal spray 2 Sprays by Both Nostrils route daily. 2/27/19  Yes Mark Willis MD   ibuprofen (MOTRIN) 200 mg tablet Take 200 mg by mouth as needed. Yes Provider, Historical   acetaminophen (TYLENOL) 325 mg tablet Take  by mouth every four (4) hours as needed for Pain. Yes Provider, Historical   loratadine (CLARITIN) 10 mg tablet Take 10 mg by mouth as needed. Yes Provider, Historical   busPIRone (BUSPAR) 5 mg tablet Take 1 Tablet by mouth two (2) times a day. Patient not taking: Reported on 2/13/2023 7/26/22 2/13/23  Myriam Sood MD         Allergies   Allergen Reactions    Hydrocodone Hives    Pineapple Hives    Wellbutrin [Bupropion] Other (comments)     Loss of taste         REVIEW OF SYSTEMS:  Per HPI    PHYSICAL EXAM:  Visit Vitals  /81 (BP 1 Location: Right upper arm, BP Patient Position: Sitting)   Pulse 82   Temp 98.7 °F (37.1 °C)   Resp 16   Ht 5' 4\" (1.626 m)   Wt 147 lb (66.7 kg)   SpO2 95%   BMI 25.23 kg/m²     Constitutional: Appears well-developed and well-nourished. No distress. HENT:   Head: Normocephalic and atraumatic. Eyes: No scleral icterus. Mouth: OP clear without lesions, no pharyngeal exudate  Neck: no lad, no tm, supple   Cardiovascular: Normal S1/S2, regular rhythm. No murmurs, rubs, or gallops. Pulmonary/Chest: Effort normal and breath sounds normal. No respiratory distress. No wheezes, rhonchi, or rales. Abdomen: Soft, NT/ND, +BS, no rebound or guarding, no masses, no HSM appreciated. Ext: No edema. Neurological: Alert. Psychiatric: Normal mood and affect. Behavior is normal.     Lab Results   Component Value Date/Time    Sodium 142 10/31/2022 10:47 AM    Potassium 4.3 10/31/2022 10:47 AM    Chloride 106 10/31/2022 10:47 AM    CO2 26 10/31/2022 10:47 AM    Anion gap 9 05/02/2017 11:30 AM    Glucose 84 10/31/2022 10:47 AM    BUN 8 10/31/2022 10:47 AM    Creatinine 0.66 10/31/2022 10:47 AM    BUN/Creatinine ratio 12 10/31/2022 10:47 AM    GFR est  03/29/2021 11:33 AM    GFR est non- 03/29/2021 11:33 AM    Calcium 9.1 10/31/2022 10:47 AM    Bilirubin, total <0.2 10/31/2022 10:47 AM    Alk. phosphatase 82 10/31/2022 10:47 AM    Protein, total 6.2 10/31/2022 10:47 AM    Albumin 4.4 10/31/2022 10:47 AM    Globulin 3.3 05/02/2017 11:30 AM    A-G Ratio 2.4 (H) 10/31/2022 10:47 AM    ALT (SGPT) 15 10/31/2022 10:47 AM     Lab Results   Component Value Date/Time    Hemoglobin A1c 5.5 05/02/2017 11:30 AM      Lab Results   Component Value Date/Time    Cholesterol, total 184 10/31/2022 10:47 AM    HDL Cholesterol 44 10/31/2022 10:47 AM    LDL, calculated 126 (H) 10/31/2022 10:47 AM    LDL, calculated 115 (H) 07/02/2018 10:13 AM    VLDL, calculated 14 10/31/2022 10:47 AM    VLDL, calculated 27 07/02/2018 10:13 AM    Triglyceride 74 10/31/2022 10:47 AM          ASSESSMENT/PLAN  Diagnoses and all orders for this visit:    1. Anxiety with depression  -     traZODone (DESYREL) 50 mg tablet; Take 1 Tablet by mouth nightly.     2. Chronic obstructive pulmonary disease, unspecified COPD type ([facility] [address] [address]    3. Acquired hypothyroidism  -     METABOLIC PANEL, COMPREHENSIVE; Future  -     TSH 3RD GENERATION; Future  -     T4, FREE; Future    4. Generalized anxiety disorder    5. Mixed stress and urge urinary incontinence  -     REFERRAL TO UROGYNECOLOGY    6. Hives    7. Bronchitis  -     methylPREDNISolone (MEDROL DOSEPACK) 4 mg tablet; use as directed  -     amoxicillin-clavulanate (AUGMENTIN) 875-125 mg per tablet; Take 1 Tablet by mouth every twelve (12) hours for 10 days. 8. Tobacco use    For bronchitis/COPD exacerbation treat with Augmentin and Medrol Dosepak. For COPD encourage smoking cessation, continue current inhalers. For hives with unclear etiology on her scalp only will start Zyrtec daily, consider referral to allergy in the future. Anxiety depression not controlled, add trazodone. Due for thyroid labs, check TFTs. For urinary incontinence refer to urogynecology. Health Maintenance Due   Topic Date Due    Cervical cancer screen  Never done    Colorectal Cancer Screening Combo  09/20/2016    Low dose CT lung screening  Never done    Breast Cancer Screen Mammogram  Never done    Flu Vaccine (1) 08/01/2022        Follow-up and Dispositions    Return for 6-8 weeks copd, thyroid, med eval, 30 MIN APPT. Reviewed plan of care. Patient has provided input and agrees with goals. The nurse provided the patient and/or family with advanced directive information if needed and encouraged the patient to provide a copy to the office when available.

## 2023-04-17 ENCOUNTER — OFFICE VISIT (OUTPATIENT)
Dept: INTERNAL MEDICINE CLINIC | Age: 53
End: 2023-04-17
Payer: COMMERCIAL

## 2023-04-17 VITALS
HEART RATE: 84 BPM | RESPIRATION RATE: 12 BRPM | TEMPERATURE: 98.9 F | SYSTOLIC BLOOD PRESSURE: 137 MMHG | OXYGEN SATURATION: 95 % | DIASTOLIC BLOOD PRESSURE: 83 MMHG | WEIGHT: 153 LBS | BODY MASS INDEX: 26.12 KG/M2 | HEIGHT: 64 IN

## 2023-04-17 DIAGNOSIS — F41.1 GENERALIZED ANXIETY DISORDER: ICD-10-CM

## 2023-04-17 DIAGNOSIS — Z12.11 COLON CANCER SCREENING: ICD-10-CM

## 2023-04-17 DIAGNOSIS — E03.9 ACQUIRED HYPOTHYROIDISM: ICD-10-CM

## 2023-04-17 DIAGNOSIS — Z12.4 CERVICAL CANCER SCREENING: ICD-10-CM

## 2023-04-17 DIAGNOSIS — J01.90 ACUTE NON-RECURRENT SINUSITIS, UNSPECIFIED LOCATION: ICD-10-CM

## 2023-04-17 DIAGNOSIS — J44.1 COPD EXACERBATION (HCC): Primary | ICD-10-CM

## 2023-04-17 PROCEDURE — 99214 OFFICE O/P EST MOD 30 MIN: CPT | Performed by: INTERNAL MEDICINE

## 2023-04-17 RX ORDER — AMOXICILLIN AND CLAVULANATE POTASSIUM 875; 125 MG/1; MG/1
1 TABLET, FILM COATED ORAL EVERY 12 HOURS
Qty: 20 TABLET | Refills: 0 | Status: SHIPPED | OUTPATIENT
Start: 2023-04-17 | End: 2023-04-27

## 2023-04-17 RX ORDER — METHYLPREDNISOLONE 4 MG/1
TABLET ORAL
Qty: 1 DOSE PACK | Refills: 0 | Status: SHIPPED | OUTPATIENT
Start: 2023-04-17 | End: 2023-04-23

## 2023-04-17 RX ORDER — LEVOTHYROXINE SODIUM 125 UG/1
125 TABLET ORAL
Qty: 90 TABLET | Refills: 0 | Status: SHIPPED | OUTPATIENT
Start: 2023-04-17

## 2023-04-17 RX ORDER — TRAZODONE HYDROCHLORIDE 150 MG/1
150 TABLET ORAL
Qty: 90 TABLET | Refills: 1 | Status: SHIPPED | OUTPATIENT
Start: 2023-04-17

## 2023-04-17 NOTE — PROGRESS NOTES
Veronica Cavazos  Identified pt with two pt identifiers(name and ). Chief Complaint   Patient presents with    COPD    Thyroid Problem    Anxiety       Reviewed record In preparation for visit and have obtained necessary documentation. 1. Have you been to the ER, urgent care clinic or hospitalized since your last visit? No     2. Have you seen or consulted any other health care providers outside of the [address] [place] [address] since your last visit? Include any pap smears or colon screening. No    Vitals reviewed with provider.     Health Maintenance reviewed:     Health Maintenance Due   Topic    Cervical cancer screen     Colorectal Cancer Screening Combo     Shingles Vaccine (1 of 2)    Low dose CT lung screening     Breast Cancer Screen Mammogram           Wt Readings from Last 3 Encounters:   23 153 lb (69.4 kg)   23 147 lb (66.7 kg)   22 149 lb 12.8 oz (67.9 kg)        Temp Readings from Last 3 Encounters:   23 98.9 °F (37.2 °C) (Oral)   23 98.7 °F (37.1 °C)   22 97.4 °F (36.3 °C) (Temporal)        BP Readings from Last 3 Encounters:   23 137/83   23 118/81   22 122/83        Pulse Readings from Last 3 Encounters:   23 84   23 82   22 88        Vitals:    23 1411   BP: 137/83   Pulse: 84   Resp: (!) 45   Temp: 98.9 °F (37.2 °C)   TempSrc: Oral   SpO2: 95%   Weight: 153 lb (69.4 kg)   Height: 5' 4\" (1.626 m)   PainSc:   7   PainLoc: Head          Learning Assessment:   :       Learning Assessment 2014   PRIMARY LEARNER Patient   HIGHEST LEVEL OF EDUCATION - PRIMARY LEARNER  DID NOT GRADUATE HIGH SCHOOL   BARRIERS PRIMARY LEARNER NONE   PRIMARY LANGUAGE ENGLISH    NEED No   LEARNER PREFERENCE PRIMARY DEMONSTRATION   ANSWERED BY patient   RELATIONSHIP SELF        Depression Screening:   :       3 most recent PHQ Screens 2022   Little interest or pleasure in doing things Not at all   Feeling down, depressed, irritable, or hopeless Not at all   Total Score PHQ 2 0   Trouble falling or staying asleep, or sleeping too much -   Feeling tired or having little energy -   Poor appetite, weight loss, or overeating -   Feeling bad about yourself - or that you are a failure or have let yourself or your family down -   Trouble concentrating on things such as school, work, reading, or watching TV -   Moving or speaking so slowly that other people could have noticed; or the opposite being so fidgety that others notice -   Thoughts of being better off dead, or hurting yourself in some way -   PHQ 9 Score -   How difficult have these problems made it for you to do your work, take care of your home and get along with others -        Fall Risk Assessment:   :     No flowsheet data found. Abuse Screening:   :       Abuse Screening Questionnaire 10/29/2018   Do you ever feel afraid of your partner? N   Are you in a relationship with someone who physically or mentally threatens you? N   Is it safe for you to go home?  Y        ADL Screening:   :       ADL Assessment 3/17/2015   Feeding yourself No Help Needed   Getting from bed to chair No Help Needed   Getting dressed No Help Needed   Bathing or showering No Help Needed   Walk across the room (includes cane/walker) No Help Needed   Using the telphone No Help Needed   Taking your medications No Help Needed   Preparing meals No Help Needed   Managing money (expenses/bills) No Help Needed   Moderately strenuous housework (laundry) No Help Needed   Shopping for personal items (toiletries/medicines) No Help Needed   Shopping for groceries No Help Needed   Driving No Help Needed   Climbing a flight of stairs No Help Needed   Getting to places beyond walking distances No Help Needed

## 2023-04-17 NOTE — PROGRESS NOTES
HPI  Ms. Len Canales is a 48y.o. year old female, she is seen today for follow up COPD, hypothyroidism, anxiety. Has been taking trazodone 125mg for a week and feeling like it's starting to help with sleep. Anxiety improved with higher dose trazodone. Complains of wheezing, cough with sputum - green/brown/yellos, stuffy nose, sinus pressure ear pain on right for the past 4 days. No f/c. Using albuterol qid since she's been sick. Chest feels tight and feeling more sob. Smoking 2 cigs per day with illness. Chief Complaint   Patient presents with    COPD    Thyroid Problem    Anxiety        Prior to Admission medications    Medication Sig Start Date End Date Taking? Authorizing Provider   levothyroxine (SYNTHROID) 125 mcg tablet Take 1 Tablet by mouth Daily (before breakfast). 4/17/23  Yes Myron Snyder MD   amoxicillin-clavulanate (AUGMENTIN) 875-125 mg per tablet Take 1 Tablet by mouth every twelve (12) hours for 10 days. 4/17/23 4/27/23 Yes Myron Snyder MD   methylPREDNISolone (MEDROL DOSEPACK) 4 mg tablet use as directed 4/17/23 4/23/23 Yes Myron Snyder MD   traZODone (DESYREL) 150 mg tablet Take 1 Tablet by mouth nightly. 4/17/23  Yes Myron Snyder MD   PARoxetine (PAXIL) 40 mg tablet TAKE 1 TABLET BY MOUTH EVERY DAY 3/9/23  Yes Myron Snyder MD   cyclobenzaprine (FLEXERIL) 10 mg tablet Take 1 Tablet by mouth two (2) times daily as needed for Muscle Spasm(s). 9/15/22  Yes Myron Snyder MD   Symbicort 160-4.5 mcg/actuation HFAA TAKE 2 PUFFS BY INHALATION TWO (2) TIMES A DAY. 8/25/22  Yes Myron Snyder MD   albuterol (PROVENTIL HFA, VENTOLIN HFA, PROAIR HFA) 90 mcg/actuation inhaler Take 1 Puff by inhalation every four (4) hours as needed for Wheezing. 3/3/20  Yes Myron Snyder MD   albuterol-ipratropium (DUO-NEB) 2.5 mg-0.5 mg/3 ml nebu 3 mL by Nebulization route every four (4) hours as needed for Wheezing or Shortness of Breath.  3/3/20  Yes Nuria Lugo MD   fluticasone [facility]) 50 mcg/actuation nasal spray 2 Sprays by Both Nostrils route daily. 2/27/19  Yes Sun Tineo MD   ibuprofen (MOTRIN) 200 mg tablet Take 1 Tablet by mouth as needed. Yes Provider, Historical   acetaminophen (TYLENOL) 325 mg tablet Take  by mouth every four (4) hours as needed for Pain. Yes Provider, Historical   loratadine (CLARITIN) 10 mg tablet Take 1 Tablet by mouth as needed. Yes Provider, Historical   traZODone (DESYREL) 50 mg tablet Take 1 Tablet by mouth nightly. 4/14/23 4/17/23  Nuria Lugo MD   levothyroxine (SYNTHROID) 150 mcg tablet TAKE 1 TABLET BY MOUTH 6 DAYS PER WEEK BEFORE BREAKFAST 10/19/22 4/17/23  Nuria Lugo MD         Allergies   Allergen Reactions    Hydrocodone Hives    Pineapple Hives    Wellbutrin [Bupropion] Other (comments)     Loss of taste         REVIEW OF SYSTEMS:  Per HPI    PHYSICAL EXAM:  Visit Vitals  /83 (BP 1 Location: Left upper arm, BP Patient Position: Sitting)   Pulse 84   Temp 98.9 °F (37.2 °C) (Oral)   Resp 12   Ht 5' 4\" (1.626 m)   Wt 153 lb (69.4 kg)   SpO2 95%   BMI 26.26 kg/m²     Constitutional: Appears well-developed and well-nourished. No distress. HENT:   Head: Normocephalic and atraumatic. +maxillary sinus tenderness to percussion  Eyes: No scleral icterus. Nose: nares patent, mucosa congested and erythematous  Ears: tm's wnl   Cardiovascular: Normal S1/S2, regular rhythm. No murmurs, rubs, or gallops. Pulmonary/Chest: Effort normal. Diffuse expiratory wheezes. No respiratory distress. No rhonchi or rales. Ext: No edema. Neurological: Alert. Psychiatric: Normal mood and affect.  Behavior is normal.     Lab Results   Component Value Date/Time    Sodium 142 04/10/2023 10:20 AM    Potassium 4.0 04/10/2023 10:20 AM    Chloride 102 04/10/2023 10:20 AM    CO2 25 04/10/2023 10:20 AM    Anion gap 9 05/02/2017 11:30 AM    Glucose 72 04/10/2023 10:20 AM    BUN 10 04/10/2023 10:20 AM Creatinine 0.72 04/10/2023 10:20 AM    BUN/Creatinine ratio 14 04/10/2023 10:20 AM    GFR est  03/29/2021 11:33 AM    GFR est non- 03/29/2021 11:33 AM    Calcium 9.4 04/10/2023 10:20 AM    Bilirubin, total 0.3 04/10/2023 10:20 AM    Alk. phosphatase 92 04/10/2023 10:20 AM    Protein, total 6.8 04/10/2023 10:20 AM    Albumin 4.6 04/10/2023 10:20 AM    Globulin 3.3 05/02/2017 11:30 AM    A-G Ratio 2.1 04/10/2023 10:20 AM    ALT (SGPT) 16 04/10/2023 10:20 AM     Lab Results   Component Value Date/Time    Hemoglobin A1c 5.5 05/02/2017 11:30 AM      Lab Results   Component Value Date/Time    Cholesterol, total 184 10/31/2022 10:47 AM    HDL Cholesterol 44 10/31/2022 10:47 AM    LDL, calculated 126 (H) 10/31/2022 10:47 AM    LDL, calculated 115 (H) 07/02/2018 10:13 AM    VLDL, calculated 14 10/31/2022 10:47 AM    VLDL, calculated 27 07/02/2018 10:13 AM    Triglyceride 74 10/31/2022 10:47 AM          ASSESSMENT/PLAN  Diagnoses and all orders for this visit:    1. COPD exacerbation (HCC)  -     amoxicillin-clavulanate (AUGMENTIN) 875-125 mg per tablet; Take 1 Tablet by mouth every twelve (12) hours for 10 days. -     methylPREDNISolone (MEDROL DOSEPACK) 4 mg tablet; use as directed    2. Colon cancer screening  -     REFERRAL TO GASTROENTEROLOGY    3. Cervical cancer screening  -     REFERRAL TO OBSTETRICS AND GYNECOLOGY    4. Acquired hypothyroidism  -     levothyroxine (SYNTHROID) 125 mcg tablet; Take 1 Tablet by mouth Daily (before breakfast). -     TSH 3RD GENERATION; Future  -     T4, FREE; Future    5. Generalized anxiety disorder  -     traZODone (DESYREL) 150 mg tablet; Take 1 Tablet by mouth nightly. 6. Acute non-recurrent sinusitis, unspecified location  -     amoxicillin-clavulanate (AUGMENTIN) 875-125 mg per tablet; Take 1 Tablet by mouth every twelve (12) hours for 10 days. Anxiety and sleep mildly improved with increasing dose of trazodone 225 mg. I will increase her to 50 mg daily.   TSH low with high free T4, decrease levothyroxine to 125 mcg daily and repeat TFTs in approximately 3 months. COPD exacerbation with sinusitis, treat with antibiotics and steroids. Strongly encourage smoking cessation and patient is considering using patches. She should use her albuterol at least 3 times a day for the next several days. Health Maintenance Due   Topic Date Due    Cervical cancer screen  Never done    Colorectal Cancer Screening Combo  09/20/2016    Shingles Vaccine (1 of 2) Never done    Low dose CT lung screening  Never done    Breast Cancer Screen Mammogram  Never done        Follow-up and Dispositions    Return for 3-4 mos anxiety, thyroid, labs prior , 30 MIN APPT. Reviewed plan of care. Patient has provided input and agrees with goals. The nurse provided the patient and/or family with advanced directive information if needed and encouraged the patient to provide a copy to the office when available.

## 2023-05-03 DIAGNOSIS — E03.9 ACQUIRED HYPOTHYROIDISM: ICD-10-CM

## 2023-05-03 RX ORDER — LEVOTHYROXINE SODIUM 150 UG/1
TABLET ORAL
Qty: 75 TABLET | Refills: 2 | OUTPATIENT
Start: 2023-05-03

## 2023-07-13 DIAGNOSIS — E03.9 HYPOTHYROIDISM, UNSPECIFIED: ICD-10-CM

## 2023-07-13 RX ORDER — LEVOTHYROXINE SODIUM 0.12 MG/1
TABLET ORAL
Qty: 90 TABLET | Refills: 0 | Status: SHIPPED | OUTPATIENT
Start: 2023-07-13

## 2023-07-13 NOTE — TELEPHONE ENCOUNTER
PCP: Jono Whitman MD     Last appt:  4/17/2023    Future Appointments   Date Time Provider [address]  [address]   8/9/2023 11:00 AM Jono Whitman MD [facility] BS AMB          Requested Prescriptions     Pending Prescriptions Disp Refills    levothyroxine (SYNTHROID) 125 MCG tablet [Pharmacy Med Name: LEVOTHYROXINE 125 MCG TABLET] 90 tablet 0     Sig: TAKE 1 TABLET BY MOUTH EVERY DAY BEFORE BREAKFAST

## 2023-10-02 ENCOUNTER — OFFICE VISIT (OUTPATIENT)
Facility: CLINIC | Age: 53
End: 2023-10-02
Payer: COMMERCIAL

## 2023-10-02 VITALS
WEIGHT: 161 LBS | RESPIRATION RATE: 18 BRPM | OXYGEN SATURATION: 94 % | SYSTOLIC BLOOD PRESSURE: 120 MMHG | HEART RATE: 81 BPM | DIASTOLIC BLOOD PRESSURE: 80 MMHG | BODY MASS INDEX: 27.49 KG/M2 | TEMPERATURE: 97.4 F | HEIGHT: 64 IN

## 2023-10-02 DIAGNOSIS — H92.02 LEFT EAR PAIN: ICD-10-CM

## 2023-10-02 DIAGNOSIS — J44.9 CHRONIC OBSTRUCTIVE PULMONARY DISEASE, UNSPECIFIED COPD TYPE (HCC): ICD-10-CM

## 2023-10-02 DIAGNOSIS — F17.200 TOBACCO USE DISORDER: ICD-10-CM

## 2023-10-02 DIAGNOSIS — E03.9 ACQUIRED HYPOTHYROIDISM: Primary | ICD-10-CM

## 2023-10-02 DIAGNOSIS — Z23 NEEDS FLU SHOT: ICD-10-CM

## 2023-10-02 DIAGNOSIS — Z12.11 COLON CANCER SCREENING: ICD-10-CM

## 2023-10-02 DIAGNOSIS — F41.1 GENERALIZED ANXIETY DISORDER: ICD-10-CM

## 2023-10-02 DIAGNOSIS — E78.2 MIXED HYPERLIPIDEMIA: ICD-10-CM

## 2023-10-02 DIAGNOSIS — Z12.31 ENCOUNTER FOR SCREENING MAMMOGRAM FOR MALIGNANT NEOPLASM OF BREAST: ICD-10-CM

## 2023-10-02 PROCEDURE — 90674 CCIIV4 VAC NO PRSV 0.5 ML IM: CPT | Performed by: INTERNAL MEDICINE

## 2023-10-02 PROCEDURE — 90471 IMMUNIZATION ADMIN: CPT | Performed by: INTERNAL MEDICINE

## 2023-10-02 PROCEDURE — 99214 OFFICE O/P EST MOD 30 MIN: CPT | Performed by: INTERNAL MEDICINE

## 2023-10-02 RX ORDER — LEVOTHYROXINE SODIUM 0.12 MG/1
125 TABLET ORAL
Qty: 90 TABLET | Refills: 0 | Status: SHIPPED | OUTPATIENT
Start: 2023-10-02

## 2023-10-02 RX ORDER — NICOTINE 21 MG/24HR
1 PATCH, TRANSDERMAL 24 HOURS TRANSDERMAL DAILY
Qty: 14 PATCH | Refills: 0 | Status: SHIPPED | OUTPATIENT
Start: 2023-10-02 | End: 2023-10-16

## 2023-10-02 RX ORDER — NICOTINE 21 MG/24HR
1 PATCH, TRANSDERMAL 24 HOURS TRANSDERMAL DAILY
Qty: 42 PATCH | Refills: 0 | Status: SHIPPED | OUTPATIENT
Start: 2023-10-02 | End: 2023-11-13

## 2023-10-02 RX ORDER — CYCLOBENZAPRINE HCL 10 MG
10 TABLET ORAL 2 TIMES DAILY PRN
Qty: 30 TABLET | Refills: 1 | Status: SHIPPED | OUTPATIENT
Start: 2023-10-02

## 2023-10-02 RX ORDER — PAROXETINE HYDROCHLORIDE 40 MG/1
60 TABLET, FILM COATED ORAL DAILY
Qty: 45 TABLET | Refills: 3 | Status: SHIPPED | OUTPATIENT
Start: 2023-10-02

## 2023-10-02 SDOH — ECONOMIC STABILITY: HOUSING INSECURITY
IN THE LAST 12 MONTHS, WAS THERE A TIME WHEN YOU DID NOT HAVE A STEADY PLACE TO SLEEP OR SLEPT IN A SHELTER (INCLUDING NOW)?: NO

## 2023-10-02 SDOH — ECONOMIC STABILITY: INCOME INSECURITY: HOW HARD IS IT FOR YOU TO PAY FOR THE VERY BASICS LIKE FOOD, HOUSING, MEDICAL CARE, AND HEATING?: NOT HARD AT ALL

## 2023-10-02 SDOH — ECONOMIC STABILITY: FOOD INSECURITY: WITHIN THE PAST 12 MONTHS, THE FOOD YOU BOUGHT JUST DIDN'T LAST AND YOU DIDN'T HAVE MONEY TO GET MORE.: NEVER TRUE

## 2023-10-02 SDOH — ECONOMIC STABILITY: FOOD INSECURITY: WITHIN THE PAST 12 MONTHS, YOU WORRIED THAT YOUR FOOD WOULD RUN OUT BEFORE YOU GOT MONEY TO BUY MORE.: NEVER TRUE

## 2023-10-02 ASSESSMENT — PATIENT HEALTH QUESTIONNAIRE - PHQ9
1. LITTLE INTEREST OR PLEASURE IN DOING THINGS: 0
SUM OF ALL RESPONSES TO PHQ QUESTIONS 1-9: 0
SUM OF ALL RESPONSES TO PHQ9 QUESTIONS 1 & 2: 0
SUM OF ALL RESPONSES TO PHQ QUESTIONS 1-9: 0
SUM OF ALL RESPONSES TO PHQ QUESTIONS 1-9: 0
2. FEELING DOWN, DEPRESSED OR HOPELESS: 0
SUM OF ALL RESPONSES TO PHQ QUESTIONS 1-9: 0

## 2023-10-11 DIAGNOSIS — F41.1 GENERALIZED ANXIETY DISORDER: ICD-10-CM

## 2023-10-11 RX ORDER — TRAZODONE HYDROCHLORIDE 150 MG/1
150 TABLET ORAL
Qty: 90 TABLET | Refills: 1 | Status: SHIPPED | OUTPATIENT
Start: 2023-10-11

## 2023-10-27 LAB
T4 FREE SERPL-MCNC: 1.76 NG/DL (ref 0.82–1.77)
TSH SERPL DL<=0.005 MIU/L-ACNC: 4.25 UIU/ML (ref 0.45–4.5)

## 2023-10-30 RX ORDER — CYCLOBENZAPRINE HCL 10 MG
10 TABLET ORAL 2 TIMES DAILY PRN
Qty: 30 TABLET | Refills: 1 | Status: SHIPPED | OUTPATIENT
Start: 2023-10-30

## 2023-11-21 ENCOUNTER — OFFICE VISIT (OUTPATIENT)
Age: 53
End: 2023-11-21

## 2023-11-21 VITALS
HEART RATE: 82 BPM | BODY MASS INDEX: 27.29 KG/M2 | DIASTOLIC BLOOD PRESSURE: 80 MMHG | OXYGEN SATURATION: 99 % | WEIGHT: 159 LBS | SYSTOLIC BLOOD PRESSURE: 141 MMHG | TEMPERATURE: 97.9 F

## 2023-11-21 DIAGNOSIS — S69.92XA INJURY TO FINGERNAIL OF LEFT HAND, INITIAL ENCOUNTER: ICD-10-CM

## 2023-11-21 DIAGNOSIS — M79.645 PAIN OF FINGER OF LEFT HAND: Primary | ICD-10-CM

## 2023-11-21 DIAGNOSIS — J20.9 ACUTE BRONCHITIS, UNSPECIFIED ORGANISM: ICD-10-CM

## 2023-11-21 RX ORDER — ALBUTEROL SULFATE 90 UG/1
2 AEROSOL, METERED RESPIRATORY (INHALATION) EVERY 4 HOURS PRN
Qty: 18 G | Refills: 0 | Status: SHIPPED | OUTPATIENT
Start: 2023-11-21

## 2023-11-21 RX ORDER — CEPHALEXIN 500 MG/1
500 CAPSULE ORAL 2 TIMES DAILY
Qty: 10 CAPSULE | Refills: 0 | Status: SHIPPED | OUTPATIENT
Start: 2023-11-21 | End: 2023-11-26

## 2023-11-21 RX ORDER — PREDNISONE 20 MG/1
40 TABLET ORAL DAILY
Qty: 10 TABLET | Refills: 0 | Status: SHIPPED | OUTPATIENT
Start: 2023-11-21 | End: 2023-11-26

## 2023-11-21 NOTE — PROGRESS NOTES
Octavio Quiros (:  1970) is a 48 y.o. female,Established patient, here for evaluation of the following chief complaint(s):  Hand Injury (Pt jammed pinky finger on the left hand in the door - bleeding and ripped finger nail (this morning) )      ASSESSMENT/PLAN:  Visit Diagnoses and Associated Orders       Pain of finger of left hand    -  Primary    XR FINGER LEFT (MIN 2 VIEWS) [69799 CPT(R)]   - Future Order         Injury to fingernail of left hand, initial encounter        cephALEXin (KEFLEX) 500 MG capsule [9500]           Acute bronchitis, unspecified organism        predniSONE (DELTASONE) 20 MG tablet [6496]      albuterol sulfate HFA (VENTOLIN HFA) 108 (90 Base) MCG/ACT inhaler [72597]                   No fracture seen on xray  You are up to date on tetanus vaccination  Keflex sent over for infection prophylaxis, 2x per day for 5 days  Keep area clean and covered, especially at work  The nail will likely fall off and could take several months to fully grow back    Will send in prednisone and albuterol inhaler for bronchitis  Does not sound bacterial at this time  Follow up if you develop any fevers or chills, or if your cough becomes increasingly productive of purulent sputum    SUBJECTIVE/OBJECTIVE:    Hand Injury        48 y.o. female presents with symptoms of left pinky finger injury. States that she closed her finger in a storm door this morning, damaging the nail of the left pinky finger. States that the finger feels ok and she denies any profound pain or tenderness in the finger. Denies tinging or numbness of the finger. She works as a  and she worries that the exposed nail bed with become infected with the type of work that she does. She currently denies any fevers or chills. On exam, wheezing is heard to upper lung fields. Patient admits that she has COPD and will periodically get bronchitis.  She reports some nasal congestion, runny nose, cough productive of clear/yellow

## 2023-11-21 NOTE — PATIENT INSTRUCTIONS
Xray Result (most recent):  XR FINGER LEFT (MIN 2 VIEWS) 11/21/2023    Narrative  INDICATION: Pain in left finger(s). COMPARISON: None. TECHNIQUE: Three views of the left little finger    FINDINGS: There is no fracture or other acute osseous or articular abnormality. The soft tissues are within normal limits. Impression  No acute abnormality.     No fracture seen on xray  You are up to date on tetanus vaccination  Keflex sent over for infection prophylaxis, 2x per day for 5 days  Keep area clean and covered, especially at work  The nail will likely fall off and could take several months to fully grow back    Will send in prednisone and albuterol inhaler for bronchitis

## 2023-12-24 DIAGNOSIS — F41.1 GENERALIZED ANXIETY DISORDER: ICD-10-CM

## 2023-12-26 RX ORDER — PAROXETINE HYDROCHLORIDE 40 MG/1
TABLET, FILM COATED ORAL
Qty: 135 TABLET | Refills: 0 | Status: SHIPPED | OUTPATIENT
Start: 2023-12-26

## 2023-12-26 NOTE — TELEPHONE ENCOUNTER
PCP: Wang Lazar MD     Last appt:  10/2/2023    No future appointments.        Requested Prescriptions     Pending Prescriptions Disp Refills    PARoxetine (PAXIL) 40 MG tablet [Pharmacy Med Name: PAROXETINE HCL 40 MG TABLET] 135 tablet 1     Sig: TAKE 1 AND 1/2 TABLETS DAILY BY MOUTH

## 2023-12-27 DIAGNOSIS — E03.9 HYPOTHYROIDISM, UNSPECIFIED: ICD-10-CM

## 2023-12-27 RX ORDER — LEVOTHYROXINE SODIUM 0.12 MG/1
125 TABLET ORAL
Qty: 90 TABLET | Refills: 0 | Status: SHIPPED | OUTPATIENT
Start: 2023-12-27

## 2023-12-27 NOTE — TELEPHONE ENCOUNTER
PCP: Brijesh Brush MD     Last appt: 10/2/2023  No future appointments.        Requested Prescriptions     Pending Prescriptions Disp Refills    levothyroxine (SYNTHROID) 125 MCG tablet [Pharmacy Med Name: LEVOTHYROXINE 125 MCG TABLET] 90 tablet 0     Sig: TAKE 1 TABLET BY MOUTH EVERY DAY BEFORE BREAKFAST

## 2024-01-08 ENCOUNTER — OFFICE VISIT (OUTPATIENT)
Age: 54
End: 2024-01-08

## 2024-01-08 VITALS
WEIGHT: 159 LBS | RESPIRATION RATE: 18 BRPM | HEART RATE: 88 BPM | DIASTOLIC BLOOD PRESSURE: 89 MMHG | SYSTOLIC BLOOD PRESSURE: 145 MMHG | OXYGEN SATURATION: 98 % | TEMPERATURE: 98.2 F | BODY MASS INDEX: 27.29 KG/M2

## 2024-01-08 DIAGNOSIS — S90.01XA CONTUSION OF RIGHT ANKLE, INITIAL ENCOUNTER: Primary | ICD-10-CM

## 2024-01-08 DIAGNOSIS — M79.671 RIGHT FOOT PAIN: ICD-10-CM

## 2024-01-08 NOTE — PROGRESS NOTES
Karmen Leonardo (:  1970) is a 53 y.o. female,Established patient, here for evaluation of the following chief complaint(s):  Ankle Pain (C/o right ankle pain from. Sx 7 days. Would like xray.)      ASSESSMENT/PLAN:  Visit Diagnoses and Associated Orders       Contusion of right ankle, initial encounter    -  Primary    XR ANKLE RIGHT (MIN 3 VIEWS) [62570 CPT(R)]   - Future Order         Right foot pain        XR FOOT RIGHT (MIN 3 VIEWS) [74142 CPT(R)]   - Future Order                  RICE, otc analgesics prn      Follow up PCP/Ortho  in PRN days if symptoms persist or if symptoms worsen.    SUBJECTIVE/OBJECTIVE:    Ankle Pain      53 y.o. female presents with symptoms of right ankle pain and swelling for one week.  Pt's dog ran into her ankle one week ago when she was sitting down.  Since then has had pain and swelling.  Able to walk but is painful.  Resting and applying ice with moderate relief.           Vitals:    24 0933   BP: (!) 145/89   Site: Right Upper Arm   Position: Sitting   Cuff Size: Medium Adult   Pulse: 88   Resp: 18   Temp: 98.2 °F (36.8 °C)   TempSrc: Temporal   SpO2: 98%   Weight: 72.1 kg (159 lb)       No results found for this visit on 24.     Physical Exam  Constitutional:       General: She is not in acute distress.     Appearance: Normal appearance. She is not ill-appearing or toxic-appearing.   HENT:      Head: Normocephalic and atraumatic.   Musculoskeletal:      Right lower leg: Normal.      Right ankle: Swelling and ecchymosis present. No deformity. Tenderness present over the lateral malleolus. Decreased range of motion.      Right Achilles Tendon: Normal.      Left ankle: Normal.      Right foot: Decreased range of motion. Normal capillary refill. Swelling, tenderness and bony tenderness present. No deformity, prominent metatarsal heads, laceration or crepitus. Normal pulse.      Left foot: Normal.        Legs:         Feet:    Neurological:      General: No

## 2024-05-05 ENCOUNTER — OFFICE VISIT (OUTPATIENT)
Age: 54
End: 2024-05-05

## 2024-05-05 VITALS
TEMPERATURE: 98.1 F | SYSTOLIC BLOOD PRESSURE: 150 MMHG | RESPIRATION RATE: 18 BRPM | OXYGEN SATURATION: 97 % | WEIGHT: 159 LBS | DIASTOLIC BLOOD PRESSURE: 92 MMHG | HEART RATE: 74 BPM | BODY MASS INDEX: 27.29 KG/M2

## 2024-05-05 DIAGNOSIS — J01.90 ACUTE BACTERIAL SINUSITIS: Primary | ICD-10-CM

## 2024-05-05 DIAGNOSIS — J40 BRONCHITIS WITH ACUTE WHEEZING: ICD-10-CM

## 2024-05-05 DIAGNOSIS — B96.89 ACUTE BACTERIAL SINUSITIS: Primary | ICD-10-CM

## 2024-05-05 RX ORDER — PREDNISONE 10 MG/1
TABLET ORAL
Qty: 21 EACH | Refills: 0 | Status: SHIPPED | OUTPATIENT
Start: 2024-05-05

## 2024-05-05 RX ORDER — AMOXICILLIN AND CLAVULANATE POTASSIUM 875; 125 MG/1; MG/1
1 TABLET, FILM COATED ORAL 2 TIMES DAILY
Qty: 20 TABLET | Refills: 0 | Status: SHIPPED | OUTPATIENT
Start: 2024-05-05 | End: 2024-05-15

## 2024-05-05 RX ORDER — ALBUTEROL SULFATE 90 UG/1
2 AEROSOL, METERED RESPIRATORY (INHALATION) EVERY 4 HOURS PRN
Qty: 18 G | Refills: 0 | Status: SHIPPED | OUTPATIENT
Start: 2024-05-05

## 2024-05-05 NOTE — PROGRESS NOTES
Subjective     Patient is 54 year old female presenting with cough, sinus pain and sinus pressure.  Symptoms began three days ago.  Denies fever, chills or shortness of breath.  Patient has been taking OTC remedies for symptom relief.      Chief Complaint   Patient presents with    Sinusitis     C/o sinus possibly bronchitis. Sx 3 days.        HPI    Past Medical History:   Diagnosis Date    Allergic rhinitis     COPD (chronic obstructive pulmonary disease) (HCC)     mild    Generalized anxiety disorder     Hypothyroidism     Psychiatric disorder        Past Surgical History:   Procedure Laterality Date    ORTHOPEDIC SURGERY      right wrist tendon    OTHER SURGICAL HISTORY      colonoscopy    TUBAL LIGATION         Family History   Problem Relation Age of Onset    Cancer Mother         uterine--chemo tx    Diabetes Mother     Heart Disease Father     Hypertension Mother        Allergies   Allergen Reactions    Bupropion Other (See Comments)     Loss of taste    Hydrocodone Hives    Oxycodone Hives    Pineapple Hives       Social History     Tobacco Use    Smoking status: Every Day     Current packs/day: 1.50     Types: Cigarettes    Smokeless tobacco: Never   Substance Use Topics    Alcohol use: No     Alcohol/week: 0.0 standard drinks of alcohol    Drug use: No       Vitals:    05/05/24 1651   BP: (!) 150/92   Pulse: 74   Resp: 18   Temp: 98.1 °F (36.7 °C)   SpO2: 97%       Review of Systems   Constitutional:  Negative for chills and fever.   HENT:  Positive for congestion, sinus pressure and sinus pain.    Respiratory:  Positive for cough and wheezing. Negative for shortness of breath.        Objective     Physical Exam  Constitutional:       General: She is not in acute distress.     Appearance: Normal appearance. She is not ill-appearing.   HENT:      Head: Normocephalic and atraumatic.      Nose: Nose normal.      Mouth/Throat:      Mouth: Mucous membranes are moist.      Pharynx: Posterior oropharyngeal

## 2024-05-05 NOTE — PATIENT INSTRUCTIONS
Thank you for visiting [facility] today.    Flonase (over the counter) nasal spray, once a day  Saline nasal sprays 1-3 days   Afrin nasal spray for no longer than 3-5 days  Ibuprofen (400-800 mg) every 8 hours; Tylenol 325-500 mg every 6 hours)   Zyrtec/Xyzal/Allegra/Claritin during the day or Benadryl at night may help with allergies.  You may use the decongestant version of these medications as well.  Simple foods like chicken noodle soup, smoothies, hot tea with lemon and honey may also help  Steam inhalation, humidifier, warm compresses  Increase oral fluids to maintain hydration  Vitamin D 4000 IU each day for one month  1/2 teaspoon turmeric each day for anti-inflammation  Avoid smoking and minimize contact with environmental irritants  Antibiotics with food and probiotics     Please follow up with your primary care provider if your symptoms last more than 10 days or worsen.    Please go immediately to the Emergency Department if you develop:  Fever higher than 102F (38.9C), sudden and severe pain in the face and head, trouble seeing or seeing double, trouble thinking clearly, swelling or redness around one or both eyes or a stiff neck

## 2024-05-14 ENCOUNTER — TELEPHONE (OUTPATIENT)
Facility: CLINIC | Age: 54
End: 2024-05-14

## 2024-05-14 NOTE — TELEPHONE ENCOUNTER
Tell her to call plastic surgeon, dermatology, eye doctor to see who would do this and likely would be considered cosmetic surgery. I would start with eye doctor.

## 2024-05-14 NOTE — TELEPHONE ENCOUNTER
I called the patient and verified them by name and date of birth. Patient is requesting a referral to a specialist who can remove cholesterol deposits from around the eyelid. It has been discussed before but they are not getting bigger/getting more and she would like to explore this option.

## 2024-05-14 NOTE — TELEPHONE ENCOUNTER
----- Message from Anette Villalobos sent at 5/14/2024 10:05 AM EDT -----  Subject: Referral Request    Reason for referral request? pt is wanting a referral to a specialist who   could remove cholesterol deposits around eyelid  Provider patient wants to be referred to(if known):     Provider Phone Number(if known):    Additional Information for Provider?   ---------------------------------------------------------------------------  --------------  CALL BACK INFO    [phone]; OK to leave message on voicemail  ---------------------------------------------------------------------------  --------------

## 2024-05-27 ASSESSMENT — ENCOUNTER SYMPTOMS
WHEEZING: 1
SINUS PAIN: 1
COUGH: 1
SINUS PRESSURE: 1
SHORTNESS OF BREATH: 0

## 2024-06-20 ENCOUNTER — TELEPHONE (OUTPATIENT)
Facility: CLINIC | Age: 54
End: 2024-06-20

## 2024-06-20 NOTE — TELEPHONE ENCOUNTER
Left message for patient to schedule mammogram ordered by PCP  Requested patient to return call to panel manager at [phone] to schedule mammogram or notify that mammogram   has been completed at an outside facility.

## 2024-08-05 ENCOUNTER — OFFICE VISIT (OUTPATIENT)
Facility: CLINIC | Age: 54
End: 2024-08-05
Payer: COMMERCIAL

## 2024-08-05 VITALS
OXYGEN SATURATION: 96 % | RESPIRATION RATE: 12 BRPM | TEMPERATURE: 99.1 F | HEIGHT: 64 IN | DIASTOLIC BLOOD PRESSURE: 79 MMHG | BODY MASS INDEX: 27.06 KG/M2 | WEIGHT: 158.5 LBS | SYSTOLIC BLOOD PRESSURE: 122 MMHG | HEART RATE: 86 BPM

## 2024-08-05 DIAGNOSIS — E03.9 ACQUIRED HYPOTHYROIDISM: Primary | ICD-10-CM

## 2024-08-05 DIAGNOSIS — F41.1 GENERALIZED ANXIETY DISORDER: ICD-10-CM

## 2024-08-05 DIAGNOSIS — J44.9 CHRONIC OBSTRUCTIVE PULMONARY DISEASE, UNSPECIFIED COPD TYPE (HCC): ICD-10-CM

## 2024-08-05 DIAGNOSIS — E78.2 MIXED HYPERLIPIDEMIA: ICD-10-CM

## 2024-08-05 DIAGNOSIS — Z23 NEED FOR PROPHYLACTIC VACCINATION AGAINST STREPTOCOCCUS PNEUMONIAE (PNEUMOCOCCUS): ICD-10-CM

## 2024-08-05 DIAGNOSIS — Z72.0 TOBACCO USE: ICD-10-CM

## 2024-08-05 DIAGNOSIS — K59.00 CONSTIPATION, UNSPECIFIED CONSTIPATION TYPE: ICD-10-CM

## 2024-08-05 DIAGNOSIS — Z12.11 COLON CANCER SCREENING: ICD-10-CM

## 2024-08-05 LAB
ALBUMIN SERPL-MCNC: 4.1 G/DL (ref 3.5–5)
ALBUMIN/GLOB SERPL: 1.3 (ref 1.1–2.2)
ALP SERPL-CCNC: 99 U/L (ref 45–117)
ALT SERPL-CCNC: 16 U/L (ref 12–78)
ANION GAP SERPL CALC-SCNC: 2 MMOL/L (ref 5–15)
AST SERPL-CCNC: 14 U/L (ref 15–37)
BASOPHILS # BLD: 0.1 K/UL (ref 0–0.1)
BASOPHILS NFR BLD: 2 % (ref 0–1)
BILIRUB SERPL-MCNC: 0.5 MG/DL (ref 0.2–1)
BUN SERPL-MCNC: 11 MG/DL (ref 6–20)
BUN/CREAT SERPL: 15 (ref 12–20)
CALCIUM SERPL-MCNC: 9.5 MG/DL (ref 8.5–10.1)
CHLORIDE SERPL-SCNC: 105 MMOL/L (ref 97–108)
CHOLEST SERPL-MCNC: 244 MG/DL
CO2 SERPL-SCNC: 32 MMOL/L (ref 21–32)
CREAT SERPL-MCNC: 0.71 MG/DL (ref 0.55–1.02)
DIFFERENTIAL METHOD BLD: ABNORMAL
EOSINOPHIL # BLD: 0.2 K/UL (ref 0–0.4)
EOSINOPHIL NFR BLD: 2 % (ref 0–7)
ERYTHROCYTE [DISTWIDTH] IN BLOOD BY AUTOMATED COUNT: 12.5 % (ref 11.5–14.5)
GLOBULIN SER CALC-MCNC: 3.2 G/DL (ref 2–4)
GLUCOSE SERPL-MCNC: 90 MG/DL (ref 65–100)
HCT VFR BLD AUTO: 41.6 % (ref 35–47)
HDLC SERPL-MCNC: 41 MG/DL
HDLC SERPL: 6 (ref 0–5)
HGB BLD-MCNC: 13.5 G/DL (ref 11.5–16)
IMM GRANULOCYTES # BLD AUTO: 0 K/UL (ref 0–0.04)
IMM GRANULOCYTES NFR BLD AUTO: 1 % (ref 0–0.5)
LDLC SERPL CALC-MCNC: 163.6 MG/DL (ref 0–100)
LYMPHOCYTES # BLD: 2.2 K/UL (ref 0.8–3.5)
LYMPHOCYTES NFR BLD: 34 % (ref 12–49)
MCH RBC QN AUTO: 31.7 PG (ref 26–34)
MCHC RBC AUTO-ENTMCNC: 32.5 G/DL (ref 30–36.5)
MCV RBC AUTO: 97.7 FL (ref 80–99)
MONOCYTES # BLD: 0.5 K/UL (ref 0–1)
MONOCYTES NFR BLD: 7 % (ref 5–13)
NEUTS SEG # BLD: 3.5 K/UL (ref 1.8–8)
NEUTS SEG NFR BLD: 54 % (ref 32–75)
NRBC # BLD: 0 K/UL (ref 0–0.01)
NRBC BLD-RTO: 0 PER 100 WBC
PLATELET # BLD AUTO: 292 K/UL (ref 150–400)
PMV BLD AUTO: 10.9 FL (ref 8.9–12.9)
POTASSIUM SERPL-SCNC: 4.3 MMOL/L (ref 3.5–5.1)
PROT SERPL-MCNC: 7.3 G/DL (ref 6.4–8.2)
RBC # BLD AUTO: 4.26 M/UL (ref 3.8–5.2)
SODIUM SERPL-SCNC: 139 MMOL/L (ref 136–145)
TRIGL SERPL-MCNC: 197 MG/DL
VLDLC SERPL CALC-MCNC: 39.4 MG/DL
WBC # BLD AUTO: 6.4 K/UL (ref 3.6–11)

## 2024-08-05 PROCEDURE — 99214 OFFICE O/P EST MOD 30 MIN: CPT | Performed by: INTERNAL MEDICINE

## 2024-08-05 PROCEDURE — 90677 PCV20 VACCINE IM: CPT | Performed by: INTERNAL MEDICINE

## 2024-08-05 PROCEDURE — 90471 IMMUNIZATION ADMIN: CPT | Performed by: INTERNAL MEDICINE

## 2024-08-05 RX ORDER — CYCLOBENZAPRINE HCL 10 MG
10 TABLET ORAL 2 TIMES DAILY PRN
Qty: 30 TABLET | Refills: 1 | Status: SHIPPED | OUTPATIENT
Start: 2024-08-05

## 2024-08-05 RX ORDER — SENNA AND DOCUSATE SODIUM 50; 8.6 MG/1; MG/1
1-2 TABLET, FILM COATED ORAL NIGHTLY PRN
Qty: 60 TABLET | Refills: 0 | Status: SHIPPED | OUTPATIENT
Start: 2024-08-05

## 2024-08-05 RX ORDER — SERTRALINE HYDROCHLORIDE 100 MG/1
100 TABLET, FILM COATED ORAL DAILY
Qty: 30 TABLET | Refills: 5 | Status: SHIPPED | OUTPATIENT
Start: 2024-08-05

## 2024-08-05 ASSESSMENT — PATIENT HEALTH QUESTIONNAIRE - PHQ9
2. FEELING DOWN, DEPRESSED OR HOPELESS: NOT AT ALL
SUM OF ALL RESPONSES TO PHQ QUESTIONS 1-9: 0
1. LITTLE INTEREST OR PLEASURE IN DOING THINGS: NOT AT ALL
SUM OF ALL RESPONSES TO PHQ9 QUESTIONS 1 & 2: 0
SUM OF ALL RESPONSES TO PHQ QUESTIONS 1-9: 0

## 2024-08-05 NOTE — PROGRESS NOTES
Karmen Leonardo  Identified pt with two pt identifiers(name and ).     Chief Complaint   Patient presents with    COPD    Thyroid Problem    Anxiety       Reviewed record In preparation for visit and have obtained necessary documentation.     1. Have you been to the ER, urgent care clinic or hospitalized since your last visit? No     2. Have you seen or consulted any other health care providers outside of the [facility] since your last visit? Include any pap smears or colon screening. No    Vitals reviewed with provider.    Health Maintenance reviewed: After verbal order read back of Dr White, patient received Prevnar 20 in right deltoid.  NDC: 9835-5548-18 Lot: [id number] Exp:2025.  Patient tolerated procedure without complaints and received VIS.    Health Maintenance Due   Topic    Hepatitis B vaccine (1 of 3 - 3-dose series)    Cervical cancer screen     Breast cancer screen     Pneumococcal 0-64 years Vaccine (2 of 2 - PCV)    Colorectal Cancer Screen     Shingles vaccine (1 of 2)    Lung Cancer Screening &/or Counseling     Flu vaccine (1)          Wt Readings from Last 3 Encounters:   24 71.9 kg (158 lb 8 oz)   24 72.1 kg (159 lb)   24 72.1 kg (159 lb)        Temp Readings from Last 3 Encounters:   24 99.1 °F (37.3 °C) (Oral)   24 98.1 °F (36.7 °C) (Oral)   24 98.2 °F (36.8 °C) (Temporal)        BP Readings from Last 3 Encounters:   24 122/79   24 (!) 150/92   24 (!) 145/89        Pulse Readings from Last 3 Encounters:   24 86   24 74   24 88      [unfilled]       Learning Assessment:   :         2023    11:00 AM   [facility] AMB LEARNING ASSESSMENT   Primary Learner Patient   level of education DID NOT GRADUATE HIGH SCHOOL   Barriers Factors NONE   co-learner caregiver No   Primary Language ENGLISH   Learning Preference DEMONSTRATION   Answered By patient   Relationship to Learner SELF         Fall Risk Assessment:   :

## 2024-08-05 NOTE — PROGRESS NOTES
HPI  Ms. Karemn Leonardo is a 54 y.o. year old female, she is seen today for follow up hypothyroidism, anxiety, COPD.   Plan last visit 10/2023:  Unclear etiology to ear pain - ?TMJ disorder - may try heat ibuprofen 600mg every 8 hours prn, if not better refer to ENT - given h/o smoking will need further evaluation  Clinically euthyroid - check tft's  COPD stable - encouraged smoking cessation   Prescribed nicotine patches to help  Anxiety overall controlled with paxil and trazodone - advised not increasing     Was supposed to follow up in January (3 mos) but didn't.   Today - 10 mos later:  COPD - sob worse in hot weather, says patches didn't help with stopping smoking - has been wheezing a little more in hot weather - no change in cough  Ear pain resolved after last visit.    Constipation - worse for months - not drinking enough water - no blood in stool, melena  Has tried miralax daily for about a week - didn't help much  Tried \"everything\" otc  Only having about one BM per week  Stools are hard to pass, balls  Hasn't tried fiber supplement  Tried stool softeners didn't help  Didn't try stimulant such as ex-lax  Tried MOM  Feeling more bloated and lots more belching - no change in diet  Weight stable    Anxiety still an issue - some days worse than others - not sad, down or depressed - has been on duloxetine, bupropion, buspar without relief and now on paroxetine 60mg daily.       Chief Complaint   Patient presents with    COPD    Thyroid Problem    Anxiety        Prior to Admission medications    Medication Sig Start Date End Date Taking? Authorizing Provider   cyclobenzaprine (FLEXERIL) 10 MG tablet Take 1 tablet by mouth 2 times daily as needed for Muscle spasms 8/5/24  Yes Rochelle White MD   sertraline (ZOLOFT) 100 MG tablet Take 1 tablet by mouth daily 8/5/24  Yes Rochelle White MD   sennosides-docusate sodium (SENOKOT-S) 8.6-50 MG tablet Take 1-2 tablets by mouth nightly as needed for

## 2024-08-05 NOTE — PATIENT INSTRUCTIONS
Patient Education        pneumococcal 20-valent conjugate vaccine  Pronunciation:  JOAN delong MARK al 20-VAY lent DIONICIO galeano VAX een  Brand:  Prevnar 20  What is the most important information I should know about pneumococcal 20-valent conjugate vaccine?  You should not receive this vaccine if you ever had a severe allergic reaction to a pneumococcal or diphtheria toxoid vaccine.  What is pneumococcal 20-valent conjugate vaccine?  Pneumococcal disease is a serious infection caused by a bacteria that can infect the sinuses, inner ear, lungs, blood, and brain. These conditions can be fatal.  Pneumococcal 20-valent conjugate vaccine is used in adults to help prevent disease caused by pneumococcal bacteria. This vaccine contains 20 different types of pneumococcal bacteria.  This vaccine helps your body develop immunity to the disease, but will not treat an active infection you already have.  Like any vaccine, pneumococcal 20-valent conjugate vaccine may not provide protection from disease in every person.  What should I discuss with my healthcare provider before taking pneumococcal 20-valent conjugate vaccine?  You should not receive this vaccine if you ever had a severe allergic reaction to a pneumococcal or diphtheria toxoid vaccine.  Tell the vaccination provider if you have:  a weak immune system (caused by disease or by using certain medicine); or  if you are receiving radiation or chemotherapy.  You can still receive a vaccine if you have a minor cold. In the case of a more severe illness with a fever or any type of infection, wait until you get better before receiving this vaccine.  Tell the vaccination provider if you are pregnant or breastfeeding.  How should I take pneumococcal 20-valent conjugate vaccine?  This vaccine is given as an injection (shot) into a muscle.  Pneumococcal 20-valent conjugate vaccine is usually given as 1 shot.  What happens if I miss a dose?  Pneumococcal 20-valent conjugate vaccine is

## 2024-08-06 LAB — TSH SERPL DL<=0.05 MIU/L-ACNC: 1.5 UIU/ML (ref 0.45–4.5)

## 2024-08-22 LAB — NONINV COLON CA DNA+OCC BLD SCRN STL QL: NEGATIVE

## 2024-08-26 ENCOUNTER — TELEPHONE (OUTPATIENT)
Facility: CLINIC | Age: 54
End: 2024-08-26

## 2024-08-26 DIAGNOSIS — F41.1 GENERALIZED ANXIETY DISORDER: ICD-10-CM

## 2024-08-26 NOTE — TELEPHONE ENCOUNTER
Pt called and wanted to see if she could get the sertraline increased some. Pt stated that it was also ok to put her on a cholesterol medication.

## 2024-08-27 RX ORDER — ATORVASTATIN CALCIUM 20 MG/1
20 TABLET, FILM COATED ORAL DAILY
Qty: 90 TABLET | Refills: 1 | Status: SHIPPED | OUTPATIENT
Start: 2024-08-27

## 2024-08-27 RX ORDER — SERTRALINE HYDROCHLORIDE 100 MG/1
150 TABLET, FILM COATED ORAL DAILY
Qty: 45 TABLET | Refills: 5 | Status: SHIPPED | OUTPATIENT
Start: 2024-08-27

## 2024-08-28 NOTE — TELEPHONE ENCOUNTER
I left a message informing on the medications being sent in but patient was already notified via "[employer]t.

## 2024-09-09 ENCOUNTER — PATIENT MESSAGE (OUTPATIENT)
Facility: CLINIC | Age: 54
End: 2024-09-09

## 2024-09-10 ENCOUNTER — TELEMEDICINE (OUTPATIENT)
Facility: CLINIC | Age: 54
End: 2024-09-10
Payer: COMMERCIAL

## 2024-09-10 DIAGNOSIS — F41.1 GENERALIZED ANXIETY DISORDER: ICD-10-CM

## 2024-09-10 PROCEDURE — 99213 OFFICE O/P EST LOW 20 MIN: CPT | Performed by: INTERNAL MEDICINE

## 2024-09-10 RX ORDER — PAROXETINE 40 MG/1
60 TABLET, FILM COATED ORAL EVERY MORNING
Qty: 135 TABLET | Refills: 0
Start: 2024-09-10

## 2024-10-22 DIAGNOSIS — F41.1 GENERALIZED ANXIETY DISORDER: ICD-10-CM

## 2024-10-22 DIAGNOSIS — E03.9 HYPOTHYROIDISM, UNSPECIFIED: ICD-10-CM

## 2024-10-22 RX ORDER — LEVOTHYROXINE SODIUM 125 UG/1
125 TABLET ORAL
Qty: 90 TABLET | Refills: 1 | Status: SHIPPED | OUTPATIENT
Start: 2024-10-22

## 2024-10-22 RX ORDER — PAROXETINE 40 MG/1
60 TABLET, FILM COATED ORAL DAILY
Qty: 135 TABLET | Refills: 0 | Status: SHIPPED | OUTPATIENT
Start: 2024-10-22

## 2024-10-22 NOTE — TELEPHONE ENCOUNTER
PCP: Rochelle White MD     Last appt:  9/10/2024      Future Appointments   Date Time Provider Department Center   1/13/2025  9:50 AM Rochelle White MD [facility] DEP          Requested Prescriptions     Pending Prescriptions Disp Refills    PARoxetine (PAXIL) 40 MG tablet [Pharmacy Med Name: PAROXETINE HCL 40 MG TABLET] 135 tablet 0     Sig: TAKE 1 AND 1/2 TABLETS BY MOUTH DAILY

## 2024-10-22 NOTE — TELEPHONE ENCOUNTER
PCP: Rochelle White MD     Last appt: 9/10/2024    Future Appointments   Date Time Provider Department Center   1/13/2025  9:50 AM Rochelle White MD [facility] DEP          Requested Prescriptions     Pending Prescriptions Disp Refills    levothyroxine (SYNTHROID) 125 MCG tablet [Pharmacy Med Name: LEVOTHYROXINE 125 MCG TABLET] 90 tablet 2     Sig: TAKE 1 TABLET BY MOUTH EVERY DAY BEFORE BREAKFAST

## 2024-11-29 ENCOUNTER — HOSPITAL ENCOUNTER (EMERGENCY)
Facility: HOSPITAL | Age: 54
Discharge: HOME | End: 2024-11-29
Attending: STUDENT IN AN ORGANIZED HEALTH CARE EDUCATION/TRAINING PROGRAM
Payer: COMMERCIAL

## 2024-11-29 ENCOUNTER — APPOINTMENT (OUTPATIENT)
Facility: HOSPITAL | Age: 54
End: 2024-11-29
Payer: COMMERCIAL

## 2024-11-29 VITALS
HEART RATE: 102 BPM | DIASTOLIC BLOOD PRESSURE: 86 MMHG | TEMPERATURE: 97.3 F | BODY MASS INDEX: 27.29 KG/M2 | OXYGEN SATURATION: 100 % | SYSTOLIC BLOOD PRESSURE: 166 MMHG | RESPIRATION RATE: 19 BRPM | WEIGHT: 159 LBS

## 2024-11-29 DIAGNOSIS — N30.01 ACUTE CYSTITIS WITH HEMATURIA: ICD-10-CM

## 2024-11-29 DIAGNOSIS — R10.9 FLANK PAIN: Primary | ICD-10-CM

## 2024-11-29 LAB
ALBUMIN SERPL-MCNC: 3.7 G/DL (ref 3.5–5)
ALBUMIN/GLOB SERPL: 1.1 (ref 1.1–2.2)
ALP SERPL-CCNC: 114 U/L (ref 45–117)
ALT SERPL-CCNC: 19 U/L (ref 12–78)
ANION GAP SERPL CALC-SCNC: 3 MMOL/L (ref 2–12)
APPEARANCE UR: ABNORMAL
AST SERPL-CCNC: 13 U/L (ref 15–37)
BACTERIA URNS QL MICRO: ABNORMAL /HPF
BASOPHILS # BLD: 0.1 K/UL (ref 0–0.1)
BASOPHILS NFR BLD: 1 % (ref 0–1)
BILIRUB SERPL-MCNC: 0.3 MG/DL (ref 0.2–1)
BILIRUB UR QL: NEGATIVE
BUN SERPL-MCNC: 15 MG/DL (ref 6–20)
BUN/CREAT SERPL: 17 (ref 12–20)
CALCIUM SERPL-MCNC: 8.9 MG/DL (ref 8.5–10.1)
CHLORIDE SERPL-SCNC: 105 MMOL/L (ref 97–108)
CO2 SERPL-SCNC: 33 MMOL/L (ref 21–32)
COLOR UR: ABNORMAL
CREAT SERPL-MCNC: 0.89 MG/DL (ref 0.55–1.02)
DIFFERENTIAL METHOD BLD: ABNORMAL
EOSINOPHIL # BLD: 0.2 K/UL (ref 0–0.4)
EOSINOPHIL NFR BLD: 2 % (ref 0–7)
EPITH CASTS URNS QL MICRO: ABNORMAL /LPF
ERYTHROCYTE [DISTWIDTH] IN BLOOD BY AUTOMATED COUNT: 12.8 % (ref 11.5–14.5)
GLOBULIN SER CALC-MCNC: 3.5 G/DL (ref 2–4)
GLUCOSE SERPL-MCNC: 97 MG/DL (ref 65–100)
GLUCOSE UR STRIP.AUTO-MCNC: NEGATIVE MG/DL
HCT VFR BLD AUTO: 41.3 % (ref 35–47)
HGB BLD-MCNC: 13.8 G/DL (ref 11.5–16)
HGB UR QL STRIP: ABNORMAL
IMM GRANULOCYTES # BLD AUTO: 0.1 K/UL (ref 0–0.04)
IMM GRANULOCYTES NFR BLD AUTO: 1 % (ref 0–0.5)
KETONES UR QL STRIP.AUTO: ABNORMAL MG/DL
LEUKOCYTE ESTERASE UR QL STRIP.AUTO: ABNORMAL
LIPASE SERPL-CCNC: 44 U/L (ref 13–75)
LYMPHOCYTES # BLD: 2.1 K/UL (ref 0.8–3.5)
LYMPHOCYTES NFR BLD: 21 % (ref 12–49)
MCH RBC QN AUTO: 31.6 PG (ref 26–34)
MCHC RBC AUTO-ENTMCNC: 33.4 G/DL (ref 30–36.5)
MCV RBC AUTO: 94.5 FL (ref 80–99)
MONOCYTES # BLD: 0.7 K/UL (ref 0–1)
MONOCYTES NFR BLD: 7 % (ref 5–13)
MUCOUS THREADS URNS QL MICRO: ABNORMAL /LPF
NEUTS SEG # BLD: 6.9 K/UL (ref 1.8–8)
NEUTS SEG NFR BLD: 68 % (ref 32–75)
NITRITE UR QL STRIP.AUTO: POSITIVE
NRBC # BLD: 0 K/UL (ref 0–0.01)
NRBC BLD-RTO: 0 PER 100 WBC
PH UR STRIP: 7 (ref 5–8)
PLATELET # BLD AUTO: 296 K/UL (ref 150–400)
PMV BLD AUTO: 10 FL (ref 8.9–12.9)
POTASSIUM SERPL-SCNC: 3.4 MMOL/L (ref 3.5–5.1)
PROT SERPL-MCNC: 7.2 G/DL (ref 6.4–8.2)
PROT UR STRIP-MCNC: >300 MG/DL
RBC # BLD AUTO: 4.37 M/UL (ref 3.8–5.2)
RBC #/AREA URNS HPF: ABNORMAL /HPF (ref 0–5)
SODIUM SERPL-SCNC: 141 MMOL/L (ref 136–145)
SP GR UR REFRACTOMETRY: 1.02 (ref 1–1.03)
URINE CULTURE IF INDICATED: ABNORMAL
UROBILINOGEN UR QL STRIP.AUTO: 0.2 EU/DL (ref 0.2–1)
WBC # BLD AUTO: 10.1 K/UL (ref 3.6–11)
WBC URNS QL MICRO: >100 /HPF (ref 0–4)

## 2024-11-29 PROCEDURE — 99285 EMERGENCY DEPT VISIT HI MDM: CPT

## 2024-11-29 PROCEDURE — 6360000002 HC RX W HCPCS

## 2024-11-29 PROCEDURE — 2580000003 HC RX 258

## 2024-11-29 PROCEDURE — 87086 URINE CULTURE/COLONY COUNT: CPT

## 2024-11-29 PROCEDURE — 80053 COMPREHEN METABOLIC PANEL: CPT

## 2024-11-29 PROCEDURE — 85025 COMPLETE CBC W/AUTO DIFF WBC: CPT

## 2024-11-29 PROCEDURE — 87088 URINE BACTERIA CULTURE: CPT

## 2024-11-29 PROCEDURE — 74177 CT ABD & PELVIS W/CONTRAST: CPT

## 2024-11-29 PROCEDURE — 87186 SC STD MICRODIL/AGAR DIL: CPT

## 2024-11-29 PROCEDURE — 81001 URINALYSIS AUTO W/SCOPE: CPT

## 2024-11-29 PROCEDURE — 93005 ELECTROCARDIOGRAM TRACING: CPT | Performed by: STUDENT IN AN ORGANIZED HEALTH CARE EDUCATION/TRAINING PROGRAM

## 2024-11-29 PROCEDURE — 96375 TX/PRO/DX INJ NEW DRUG ADDON: CPT

## 2024-11-29 PROCEDURE — 6360000004 HC RX CONTRAST MEDICATION

## 2024-11-29 PROCEDURE — 36415 COLL VENOUS BLD VENIPUNCTURE: CPT

## 2024-11-29 PROCEDURE — 96374 THER/PROPH/DIAG INJ IV PUSH: CPT

## 2024-11-29 PROCEDURE — 83690 ASSAY OF LIPASE: CPT

## 2024-11-29 RX ORDER — KETOROLAC TROMETHAMINE 10 MG/1
10 TABLET, FILM COATED ORAL EVERY 6 HOURS PRN
Qty: 20 TABLET | Refills: 0 | Status: SHIPPED | OUTPATIENT
Start: 2024-11-29

## 2024-11-29 RX ORDER — IOPAMIDOL 755 MG/ML
100 INJECTION, SOLUTION INTRAVASCULAR
Status: COMPLETED | OUTPATIENT
Start: 2024-11-29 | End: 2024-11-29

## 2024-11-29 RX ORDER — ONDANSETRON 2 MG/ML
4 INJECTION INTRAMUSCULAR; INTRAVENOUS ONCE
Status: COMPLETED | OUTPATIENT
Start: 2024-11-29 | End: 2024-11-29

## 2024-11-29 RX ORDER — CEFDINIR 300 MG/1
300 CAPSULE ORAL 2 TIMES DAILY
Qty: 20 CAPSULE | Refills: 0 | Status: SHIPPED | OUTPATIENT
Start: 2024-11-29 | End: 2024-12-09

## 2024-11-29 RX ORDER — KETOROLAC TROMETHAMINE 30 MG/ML
30 INJECTION, SOLUTION INTRAMUSCULAR; INTRAVENOUS
Status: COMPLETED | OUTPATIENT
Start: 2024-11-29 | End: 2024-11-29

## 2024-11-29 RX ADMIN — IOPAMIDOL 100 ML: 755 INJECTION, SOLUTION INTRAVENOUS at 17:20

## 2024-11-29 RX ADMIN — WATER 1000 MG: 1 INJECTION INTRAMUSCULAR; INTRAVENOUS; SUBCUTANEOUS at 17:07

## 2024-11-29 RX ADMIN — KETOROLAC TROMETHAMINE 30 MG: 30 INJECTION, SOLUTION INTRAMUSCULAR at 17:10

## 2024-11-29 RX ADMIN — ONDANSETRON 4 MG: 2 INJECTION INTRAMUSCULAR; INTRAVENOUS at 17:09

## 2024-11-29 ASSESSMENT — PAIN SCALES - GENERAL: PAINLEVEL_OUTOF10: 5

## 2024-11-29 ASSESSMENT — PAIN - FUNCTIONAL ASSESSMENT: PAIN_FUNCTIONAL_ASSESSMENT: 0-10

## 2024-11-29 ASSESSMENT — PAIN DESCRIPTION - LOCATION: LOCATION: ABDOMEN;FLANK;CHEST

## 2024-11-29 NOTE — ED PROVIDER NOTES
[facility] EMERGENCY DEPT  EMERGENCY DEPARTMENT ENCOUNTER         Pt Name: Karmen Leonardo  MRN: [id number]  Birthdate 1970  Date of evaluation: 11/29/2024  Provider: Debra Braga PA-C   PCP: Rochelle White MD  Note Started: 5:28 PM EST 11/29/24     CHIEF COMPLAINT       Chief Complaint   Patient presents with    Flank Pain     Patient ambulatory to triage with c/o right sided flank pain and abdominal pain since Wednesday. Patient reports the pain is now radiating to her chest and upper back. Patient reports vomiting and diarrhea that started yesterday afternoon. Patient reports blood in urine.         HISTORY OF PRESENT ILLNESS: 1 or more elements      History From: Patient  HPI Limitations: None     Karmen Leonardo is a 54 y.o. female who presents with right-sided flank pain and urinary frequency that started 2 days ago.  Patient also notes blood in her urine.     Nursing Notes were all reviewed and agreed with or any disagreements were addressed in the HPI.  Please see MDM for additional details of HPI and ROS     REVIEW OF SYSTEMS      Review of Systems   Constitutional:  Positive for chills and fatigue.   Genitourinary:  Positive for flank pain and frequency.   All other systems reviewed and are negative.       Positives and Pertinent negatives as per HPI.    PAST HISTORY     Past Medical History:  Past Medical History:   Diagnosis Date    Allergic rhinitis     COPD (chronic obstructive pulmonary disease) (HCC)     mild    Generalized anxiety disorder     Hypothyroidism     Psychiatric disorder        Past Surgical History:  Past Surgical History:   Procedure Laterality Date    ORTHOPEDIC SURGERY      right wrist tendon    OTHER SURGICAL HISTORY      colonoscopy    TUBAL LIGATION         Family History:  Family History   Problem Relation Age of Onset    Cancer Mother         uterine--chemo tx    Diabetes Mother     Heart Disease Father     Hypertension Mother        Social History:  Social History  0.55 - 1.02 MG/DL    BUN/Creatinine Ratio 17 12 - 20      Est, Glom Filt Rate 77 >60 ml/min/1.73m2    Calcium 8.9 8.5 - 10.1 MG/DL    Total Bilirubin 0.3 0.2 - 1.0 MG/DL    ALT 19 12 - 78 U/L    AST 13 (L) 15 - 37 U/L    Alk Phosphatase 114 45 - 117 U/L    Total Protein 7.2 6.4 - 8.2 g/dL    Albumin 3.7 3.5 - 5.0 g/dL    Globulin 3.5 2.0 - 4.0 g/dL    Albumin/Globulin Ratio 1.1 1.1 - 2.2     Lipase    Collection Time: 11/29/24  3:56 PM   Result Value Ref Range    Lipase 44 13 - 75 U/L   Urinalysis with Reflex to Culture “IF” dysuria, frequency, or urgency.    Collection Time: 11/29/24  3:56 PM    Specimen: Urine   Result Value Ref Range    Color, UA YELLOW/STRAW      Appearance CLOUDY (A) CLEAR      Specific Gravity, UA 1.023 1.003 - 1.030      pH, Urine 7.0 5.0 - 8.0      Protein, UA >300 (A) NEG mg/dL    Glucose, Ur Negative NEG mg/dL    Ketones, Urine TRACE (A) NEG mg/dL    Bilirubin, Urine Negative NEG      Blood, Urine LARGE (A) NEG      Urobilinogen, Urine 0.2 0.2 - 1.0 EU/dL    Nitrite, Urine Positive (A) NEG      Leukocyte Esterase, Urine MODERATE (A) NEG      WBC, UA >100 (H) 0 - 4 /hpf    RBC, UA 20-50 0 - 5 /hpf    Epithelial Cells, UA FEW FEW /lpf    BACTERIA, URINE 2+ (A) NEG /hpf    Urine Culture if Indicated URINE CULTURE ORDERED (A) CNI      Mucus, UA 1+ (A) NEG /lpf       RADIOLOGY:  Non-plain film images such as CT, Ultrasound and MRI are read by the radiologist. Plain radiographic images are visualized and preliminarily interpreted by myself with the below findings:          Interpretation per the Radiologist below, if available at the time of this note:     CT ABDOMEN PELVIS W IV CONTRAST Additional Contrast? None    Result Date: 11/29/2024  CT ABDOMEN AND PELVIS WITH CONTRAST. 11/29/2024 5:19 PM INDICATION: Right flank pain, hematuria, UTI. COMPARISON: None. TECHNIQUE: CT of the abdomen and pelvis was performed after the administration of 100 cc IV Isovue-370. CT dose reduction was achieved

## 2024-11-30 LAB
EKG ATRIAL RATE: 95 BPM
EKG DIAGNOSIS: NORMAL
EKG P AXIS: 85 DEGREES
EKG P-R INTERVAL: 132 MS
EKG Q-T INTERVAL: 372 MS
EKG QRS DURATION: 78 MS
EKG QTC CALCULATION (BAZETT): 467 MS
EKG R AXIS: 94 DEGREES
EKG T AXIS: -63 DEGREES
EKG VENTRICULAR RATE: 95 BPM

## 2024-12-01 LAB
BACTERIA SPEC CULT: ABNORMAL
CC UR VC: ABNORMAL
SERVICE CMNT-IMP: ABNORMAL

## 2025-01-13 ENCOUNTER — OFFICE VISIT (OUTPATIENT)
Facility: CLINIC | Age: 55
End: 2025-01-13

## 2025-01-13 VITALS
HEART RATE: 85 BPM | OXYGEN SATURATION: 94 % | BODY MASS INDEX: 27.59 KG/M2 | HEIGHT: 64 IN | WEIGHT: 161.6 LBS | SYSTOLIC BLOOD PRESSURE: 129 MMHG | RESPIRATION RATE: 16 BRPM | DIASTOLIC BLOOD PRESSURE: 78 MMHG

## 2025-01-13 DIAGNOSIS — E03.9 ACQUIRED HYPOTHYROIDISM: ICD-10-CM

## 2025-01-13 DIAGNOSIS — J44.9 CHRONIC OBSTRUCTIVE PULMONARY DISEASE, UNSPECIFIED COPD TYPE (HCC): ICD-10-CM

## 2025-01-13 DIAGNOSIS — R10.13 DYSPEPSIA: ICD-10-CM

## 2025-01-13 DIAGNOSIS — F41.1 GENERALIZED ANXIETY DISORDER: Primary | ICD-10-CM

## 2025-01-13 DIAGNOSIS — N39.41 URGE URINARY INCONTINENCE: ICD-10-CM

## 2025-01-13 RX ORDER — BUDESONIDE AND FORMOTEROL FUMARATE DIHYDRATE 160; 4.5 UG/1; UG/1
2 AEROSOL RESPIRATORY (INHALATION) 2 TIMES DAILY
Qty: 10.2 G | Refills: 5 | Status: SHIPPED | OUTPATIENT
Start: 2025-01-13

## 2025-01-13 RX ORDER — ALBUTEROL SULFATE 90 UG/1
2 INHALANT RESPIRATORY (INHALATION) EVERY 4 HOURS PRN
Qty: 18 G | Refills: 5 | Status: SHIPPED | OUTPATIENT
Start: 2025-01-13

## 2025-01-13 SDOH — ECONOMIC STABILITY: FOOD INSECURITY: WITHIN THE PAST 12 MONTHS, YOU WORRIED THAT YOUR FOOD WOULD RUN OUT BEFORE YOU GOT MONEY TO BUY MORE.: NEVER TRUE

## 2025-01-13 SDOH — ECONOMIC STABILITY: FOOD INSECURITY: WITHIN THE PAST 12 MONTHS, THE FOOD YOU BOUGHT JUST DIDN'T LAST AND YOU DIDN'T HAVE MONEY TO GET MORE.: NEVER TRUE

## 2025-01-13 ASSESSMENT — PATIENT HEALTH QUESTIONNAIRE - PHQ9
SUM OF ALL RESPONSES TO PHQ QUESTIONS 1-9: 0
1. LITTLE INTEREST OR PLEASURE IN DOING THINGS: NOT AT ALL
SUM OF ALL RESPONSES TO PHQ QUESTIONS 1-9: 0
SUM OF ALL RESPONSES TO PHQ QUESTIONS 1-9: 0
SUM OF ALL RESPONSES TO PHQ9 QUESTIONS 1 & 2: 0
2. FEELING DOWN, DEPRESSED OR HOPELESS: NOT AT ALL
SUM OF ALL RESPONSES TO PHQ QUESTIONS 1-9: 0

## 2025-01-13 NOTE — PROGRESS NOTES
After verbal order read back of Rochelle davis MD, patient received Flu Shot in Right deltoid.  NDC: 22236-501-56 Lot: [id number] Exp 06/30/2025. Patient tolerated procedure without complaints and received VIS.   
\"Have you been to the ER, urgent care clinic since your last visit?  Hospitalized since your last visit?\"    YES - When: approximately 1 months ago.  Where and Why: [facility] UTI Sx.    “Have you seen or consulted any other health care providers outside our system since your last visit?”    NO    Have you had a mammogram?”   NO    No breast cancer screening on file      “Have you had a pap smear?”    NO    No cervical cancer screening on file            
medication along with continuing albuterol as needed.  Dyspepsia and dysphagia, start omeprazole.  If dysphagia persist see GI.  Clinically euthyroid get thyroid labs today.  Lipids have been at goal on atorvastatin, check yearly.    Health Maintenance Due   Topic Date Due    Hepatitis B vaccine (1 of 3 - 19+ 3-dose series) Never done    Cervical cancer screen  Never done    Breast cancer screen  Never done    Shingles vaccine (1 of 2) Never done    Lung Cancer Screening &/or Counseling  Never done        Follow-up and Dispositions    Return in about 6 months (around 7/13/2025) for CHOLESTEROL, THYROID, ANXIETY, COPD.            Reviewed plan of care. Patient has provided input and agrees with goals.     The nurse provided the patient and/or family with advanced directive information if needed and encouraged the patient to provide a copy to the office when available.

## 2025-01-17 ENCOUNTER — TELEPHONE (OUTPATIENT)
Facility: CLINIC | Age: 55
End: 2025-01-17

## 2025-01-17 DIAGNOSIS — E03.9 ACQUIRED HYPOTHYROIDISM: Primary | ICD-10-CM

## 2025-01-17 NOTE — TELEPHONE ENCOUNTER
----- Message from Janet ANTOINE sent at 2025  2:00 PM EST -----  Regarding: LAB SPECIMEN PROBLEM  Contact: [phone]  Please see information below for details regarding a problem with samples received at [facility].    Patient Name: Karmen Leonardo    Patient : 1970    Tests Affected:  CH7, THYPC    Description of Problem: Lab error , sample lost at [facility] lab    Please place a new order and Client Services will contact the patient for recollection.    If you have any questions please call [phone] and a representative will assist you.  Thank you,  [facility] Client Services

## 2025-01-21 DIAGNOSIS — F41.1 GENERALIZED ANXIETY DISORDER: ICD-10-CM

## 2025-01-21 RX ORDER — PAROXETINE 40 MG/1
TABLET, FILM COATED ORAL
Qty: 135 TABLET | Refills: 1 | Status: SHIPPED | OUTPATIENT
Start: 2025-01-21

## 2025-01-21 NOTE — TELEPHONE ENCOUNTER
Future Appointments:  Future Appointments   Date Time Provider Department Center   7/14/2025  9:50 AM Rochelle White MD [facility] DEP        Last Appointment With Me:  1/13/2025     Requested Prescriptions     Pending Prescriptions Disp Refills    PARoxetine (PAXIL) 40 MG tablet [Pharmacy Med Name: PAROXETINE HCL 40 MG TABLET] 135 tablet 0     Sig: TAKE 1 AND 1/2 TABLETS DAILY BY MOUTH

## 2025-01-30 ENCOUNTER — OFFICE VISIT (OUTPATIENT)
Age: 55
End: 2025-01-30

## 2025-01-30 VITALS
TEMPERATURE: 98.4 F | HEART RATE: 83 BPM | SYSTOLIC BLOOD PRESSURE: 119 MMHG | DIASTOLIC BLOOD PRESSURE: 72 MMHG | RESPIRATION RATE: 16 BRPM | BODY MASS INDEX: 27.29 KG/M2 | WEIGHT: 159 LBS | OXYGEN SATURATION: 95 %

## 2025-01-30 DIAGNOSIS — N30.01 ACUTE CYSTITIS WITH HEMATURIA: Primary | ICD-10-CM

## 2025-01-30 DIAGNOSIS — R30.0 DYSURIA: ICD-10-CM

## 2025-01-30 LAB
BILIRUBIN, URINE, POC: ABNORMAL
BLOOD URINE, POC: ABNORMAL
GLUCOSE URINE, POC: NEGATIVE
KETONES, URINE, POC: ABNORMAL
LEUKOCYTE ESTERASE, URINE, POC: ABNORMAL
NITRITE, URINE, POC: NEGATIVE
PH, URINE, POC: 6.5 (ref 4.6–8)
PROTEIN,URINE, POC: ABNORMAL
SPECIFIC GRAVITY, URINE, POC: 1.03 (ref 1–1.03)
URINALYSIS CLARITY, POC: ABNORMAL
URINALYSIS COLOR, POC: YELLOW
UROBILINOGEN, POC: ABNORMAL

## 2025-01-30 RX ORDER — PHENAZOPYRIDINE HYDROCHLORIDE 100 MG/1
100 TABLET, FILM COATED ORAL 3 TIMES DAILY PRN
Qty: 9 TABLET | Refills: 0 | Status: SHIPPED | OUTPATIENT
Start: 2025-01-30 | End: 2026-01-30

## 2025-01-30 RX ORDER — NITROFURANTOIN 25; 75 MG/1; MG/1
100 CAPSULE ORAL 2 TIMES DAILY
Qty: 14 CAPSULE | Refills: 0 | Status: SHIPPED | OUTPATIENT
Start: 2025-01-30 | End: 2025-02-06

## 2025-01-30 NOTE — PROGRESS NOTES
Karmen Leonardo (:  1970) is a 55 y.o. female,Established patient, here for evaluation of the following chief complaint(s):  Dysuria (Dysuria, hematuria, since yesterday)          ASSESSMENT/PLAN:    Assessment & Plan  Acute cystitis with hematuria            Dysuria       Orders:    AMB POC URINALYSIS DIP STICK AUTO W/O MICRO     Discussed with patient urine is positive with leukocytes and blood so we will treat for urinary tract infection discussed can start Pyridium to help with symptoms while antibiotic starts to work make sure getting plenty of fluids try to take antibiotic with food to minimize GI upset and continue follow-up with urologist if any worsening back pain fevers chills or urinary symptoms or no improvement in the next 7 days please follow-up with PCP urgent care or emergency department depending on the severity of symptoms we will also send a urine culture due to your more recent UTI to ensure organism is sensitive to nitrofurantoin    I have discussed the results, diagnosis and treatment plan with the patient. The patient also understands that early in the process of an illness, an urgent care workup can be falsely reassuring. Routine discharge counseling and specific return precautions discussed with patient and the patient understands that worsening, changing or persistent symptoms should prompt an immediate return to the urgent care or emergency department. Patient/Guardian expressed understanding and agrees with the discharge plan. No further questions at time of discharge.     SUBJECTIVE/OBJECTIVE:  55 y.o. female presents with symptoms of Dysuria (Dysuria, hematuria, since yesterday)      55-year-old female presents to urgent care stating she started to have some pain with urination yesterday but this morning was much worse no fevers no chills but does have urinary frequency as well as noticed some blood in urine and incomplete emptying has some mild right back pain that started

## 2025-01-30 NOTE — PATIENT INSTRUCTIONS
Discussed with patient urine is positive with leukocytes and blood so we will treat for urinary tract infection discussed can start Pyridium to help with symptoms while antibiotic starts to work make sure getting plenty of fluids try to take antibiotic with food to minimize GI upset and continue follow-up with urologist if any worsening back pain fevers chills or urinary symptoms or no improvement in the next 7 days please follow-up with PCP urgent care or emergency department depending on the severity of symptoms we will also send a urine culture due to your more recent UTI to ensure organism is sensitive to nitrofurantoin

## 2025-02-01 LAB
BACTERIA SPEC CULT: NORMAL
SERVICE CMNT-IMP: NORMAL

## 2025-02-03 ENCOUNTER — OFFICE VISIT (OUTPATIENT)
Age: 55
End: 2025-02-03

## 2025-02-03 VITALS
DIASTOLIC BLOOD PRESSURE: 74 MMHG | OXYGEN SATURATION: 99 % | HEART RATE: 85 BPM | RESPIRATION RATE: 16 BRPM | TEMPERATURE: 98.4 F | SYSTOLIC BLOOD PRESSURE: 143 MMHG | WEIGHT: 160 LBS | BODY MASS INDEX: 27.46 KG/M2

## 2025-02-03 DIAGNOSIS — T14.8XXA WOUND INFECTION: Primary | ICD-10-CM

## 2025-02-03 DIAGNOSIS — L08.9 WOUND INFECTION: Primary | ICD-10-CM

## 2025-02-03 RX ORDER — FLUCONAZOLE 150 MG/1
150 TABLET ORAL
Qty: 2 TABLET | Refills: 0 | Status: SHIPPED | OUTPATIENT
Start: 2025-02-03 | End: 2025-02-09

## 2025-02-04 DIAGNOSIS — R10.13 DYSPEPSIA: ICD-10-CM

## 2025-02-04 NOTE — PATIENT INSTRUCTIONS
Discussed with patient continue to do warm soaks twice a day we will go ahead and start Augmentin for infection related to possible dog scratch or contamination discussed if no improvement in 24 hours or any worsening symptoms to go immediately to the emergency department otherwise continue warm soaks and antibiotics for the next 7 days prescribed fluconazole can take 1 today and again in 3 days to help prevent yeast infection

## 2025-02-04 NOTE — PROGRESS NOTES
Karmen Leonardo (:  1970) is a 55 y.o. female,Established patient, here for evaluation of the following chief complaint(s):  Hand Pain (Right thumb pain with swelling. /X 1 week )          ASSESSMENT/PLAN:    Assessment & Plan  Wound infection             Discussed with patient continue to do warm soaks twice a day we will go ahead and start Augmentin for infection related to possible dog scratch or contamination discussed if no improvement in 24 hours or any worsening symptoms to go immediately to the emergency department otherwise continue warm soaks and antibiotics for the next 7 days prescribed fluconazole can take 1 today and again in 3 days to help prevent yeast infection    I have discussed the results, diagnosis and treatment plan with the patient. The patient also understands that early in the process of an illness, an urgent care workup can be falsely reassuring. Routine discharge counseling and specific return precautions discussed with patient and the patient understands that worsening, changing or persistent symptoms should prompt an immediate return to the urgent care or emergency department. Patient/Guardian expressed understanding and agrees with the discharge plan. No further questions at time of discharge.     SUBJECTIVE/OBJECTIVE:  55 y.o. female presents with symptoms of Hand Pain (Right thumb pain with swelling. /X 1 week )      55-year-old presents to urgent care stating she was here last week for UTI has taken Macrobid and urinary symptoms have resolved however she did not mention at the time that she had started feeling some pain in her right thumb patient states she is a  and believes she may have been scratched or maybe something got under her nail but has continued to increase in swelling and now seems to be spreading to the hand and has pain around the nail and thumb felt warm this week but no temperature taken no chills patient also states she gets frequent yeast

## 2025-02-04 NOTE — TELEPHONE ENCOUNTER
PCP: Rochelle White MD     Last appt: 1/13/2025    Future Appointments   Date Time Provider Department Center   7/14/2025  9:50 AM Rochelle White MD [facility] DEP          Requested Prescriptions     Pending Prescriptions Disp Refills    omeprazole (PRILOSEC) 20 MG delayed release capsule [Pharmacy Med Name: OMEPRAZOLE DR 20 MG CAPSULE] 90 capsule 1     Sig: TAKE 1 CAPSULE BY MOUTH EVERY DAY IN THE MORNING BEFORE BREAKFAST

## 2025-02-19 DIAGNOSIS — E03.9 HYPOTHYROIDISM, UNSPECIFIED: ICD-10-CM

## 2025-02-19 RX ORDER — LEVOTHYROXINE SODIUM 125 UG/1
125 TABLET ORAL
Qty: 90 TABLET | Refills: 1 | Status: SHIPPED | OUTPATIENT
Start: 2025-02-19

## 2025-02-19 NOTE — TELEPHONE ENCOUNTER
PCP: Rochelle White MD     Last appt:  1/13/2025      Future Appointments   Date Time Provider Department Center   7/14/2025  9:50 AM Rochelle White MD [facility] DEP          Requested Prescriptions     Pending Prescriptions Disp Refills    levothyroxine (SYNTHROID) 125 MCG tablet [Pharmacy Med Name: LEVOTHYROXINE 125 MCG TABLET] 90 tablet 1     Sig: TAKE 1 TABLET BY MOUTH EVERY DAY BEFORE BREAKFAST

## 2025-02-24 RX ORDER — ATORVASTATIN CALCIUM 20 MG/1
20 TABLET, FILM COATED ORAL DAILY
Qty: 90 TABLET | Refills: 1 | Status: SHIPPED | OUTPATIENT
Start: 2025-02-24

## 2025-02-24 NOTE — TELEPHONE ENCOUNTER
PCP: Rochelle White MD     Last appt:  1/13/2025      Future Appointments   Date Time Provider Department Center   7/14/2025  9:50 AM Rochelle White MD [facility] DEP          Requested Prescriptions     Pending Prescriptions Disp Refills    atorvastatin (LIPITOR) 20 MG tablet [Pharmacy Med Name: ATORVASTATIN 20 MG TABLET] 90 tablet 1     Sig: TAKE 1 TABLET BY MOUTH EVERY DAY

## 2025-04-15 ENCOUNTER — OFFICE VISIT (OUTPATIENT)
Age: 55
End: 2025-04-15

## 2025-04-15 VITALS
BODY MASS INDEX: 27.12 KG/M2 | OXYGEN SATURATION: 96 % | DIASTOLIC BLOOD PRESSURE: 85 MMHG | RESPIRATION RATE: 18 BRPM | WEIGHT: 158 LBS | SYSTOLIC BLOOD PRESSURE: 134 MMHG | TEMPERATURE: 97.9 F | HEART RATE: 90 BPM

## 2025-04-15 DIAGNOSIS — R05.1 ACUTE COUGH: ICD-10-CM

## 2025-04-15 DIAGNOSIS — R06.02 SHORTNESS OF BREATH: ICD-10-CM

## 2025-04-15 DIAGNOSIS — J44.1 COPD EXACERBATION (HCC): Primary | ICD-10-CM

## 2025-04-15 RX ORDER — DOXYCYCLINE HYCLATE 100 MG
100 TABLET ORAL 2 TIMES DAILY
Qty: 14 TABLET | Refills: 0 | Status: SHIPPED | OUTPATIENT
Start: 2025-04-15 | End: 2025-04-22

## 2025-04-15 RX ORDER — PREDNISONE 20 MG/1
40 TABLET ORAL DAILY
Qty: 10 TABLET | Refills: 0 | Status: SHIPPED | OUTPATIENT
Start: 2025-04-15 | End: 2025-04-20

## 2025-04-15 RX ORDER — IPRATROPIUM BROMIDE AND ALBUTEROL SULFATE 2.5; .5 MG/3ML; MG/3ML
1 SOLUTION RESPIRATORY (INHALATION) EVERY 4 HOURS
Qty: 360 ML | Refills: 0 | Status: SHIPPED | OUTPATIENT
Start: 2025-04-15 | End: 2025-04-15 | Stop reason: CLARIF

## 2025-04-15 RX ORDER — PREDNISONE 20 MG/1
40 TABLET ORAL DAILY
Status: DISCONTINUED | OUTPATIENT
Start: 2025-04-15 | End: 2025-04-15

## 2025-04-15 RX ORDER — IPRATROPIUM BROMIDE AND ALBUTEROL SULFATE 2.5; .5 MG/3ML; MG/3ML
1 SOLUTION RESPIRATORY (INHALATION) ONCE
Status: COMPLETED | OUTPATIENT
Start: 2025-04-15 | End: 2025-04-15

## 2025-04-15 RX ADMIN — IPRATROPIUM BROMIDE AND ALBUTEROL SULFATE 1 DOSE: 2.5; .5 SOLUTION RESPIRATORY (INHALATION) at 10:10

## 2025-04-15 ASSESSMENT — ENCOUNTER SYMPTOMS
RHINORRHEA: 0
SORE THROAT: 0
CHEST TIGHTNESS: 0
COUGH: 1
SHORTNESS OF BREATH: 1

## 2025-04-15 NOTE — PROGRESS NOTES
Subjective:      The patient/guardian gave verbal consent to treat.        Chief Complaint   Patient presents with    Cough     Cough, congestion, SOB   X 1.5  week        Karmen Leonardo is a 55 y.o. female presenting to clinic today with productive cough, congestion, and shortness of breath x 1.5 weeks. Endorses low-grade temperatures, denies chills. States that the shortness of breath is worse on exertion. Reports that the cough is worse at night. Taking coricidin, mucinex, and cough drops with very little relief. Patient reports smoking 1-1.5ppd cigarettes. Using albuterol three times daily since symptom onset. Taking claritin due to high pollen count this spring, thinks that seasonal allergies are what triggered her symptoms. No other complaints today.        History provided by:  Patient  History limited by: nothing.   used: No          Review of Systems   Constitutional:  Negative for chills and fever.   HENT:  Positive for congestion. Negative for rhinorrhea and sore throat.    Respiratory:  Positive for cough and shortness of breath. Negative for chest tightness.    Cardiovascular:  Negative for chest pain.       Review of Systems - negative except as listed above    Reviewed PmHx, RxHx, FmHx, SocHx, AllgHx and updated in chart.  Family History   Problem Relation Age of Onset    Cancer Mother         uterine--chemo tx    Diabetes Mother     Heart Disease Father     Hypertension Mother        Past Medical History:   Diagnosis Date    Allergic rhinitis     COPD (chronic obstructive pulmonary disease) (HCC)     mild    Generalized anxiety disorder     Hyperlipidemia     Hypothyroidism     Psychiatric disorder       Social History     Socioeconomic History    Marital status:      Spouse name: None    Number of children: None    Years of education: None    Highest education level: None   Tobacco Use    Smoking status: Every Day     Current packs/day: 1.50     Types: Cigarettes

## 2025-07-10 DIAGNOSIS — F41.1 GENERALIZED ANXIETY DISORDER: ICD-10-CM

## 2025-07-10 RX ORDER — PAROXETINE 40 MG/1
TABLET, FILM COATED ORAL
Qty: 135 TABLET | Refills: 1 | Status: SHIPPED | OUTPATIENT
Start: 2025-07-10

## 2025-07-10 NOTE — TELEPHONE ENCOUNTER
Future Appointments:  Future Appointments   Date Time Provider Department Center   9/8/2025 10:50 AM Rochelle White MD [facility] DEP        Last Appointment With Me:  1/13/2025     Requested Prescriptions     Pending Prescriptions Disp Refills    PARoxetine (PAXIL) 40 MG tablet [Pharmacy Med Name: PAROXETINE HCL 40 MG TABLET] 135 tablet 1     Sig: TAKE 1 AND 1/2 TABLETS DAILY BY MOUTH

## 2025-07-15 ENCOUNTER — OFFICE VISIT (OUTPATIENT)
Age: 55
End: 2025-07-15

## 2025-07-15 VITALS
TEMPERATURE: 98.5 F | DIASTOLIC BLOOD PRESSURE: 78 MMHG | RESPIRATION RATE: 18 BRPM | SYSTOLIC BLOOD PRESSURE: 117 MMHG | OXYGEN SATURATION: 96 % | WEIGHT: 162 LBS | HEART RATE: 94 BPM | BODY MASS INDEX: 27.81 KG/M2

## 2025-07-15 DIAGNOSIS — R07.81 RIB PAIN: Primary | ICD-10-CM

## 2025-07-15 DIAGNOSIS — B37.9 CANDIDA INFECTION: ICD-10-CM

## 2025-07-15 DIAGNOSIS — R06.2 WHEEZING: ICD-10-CM

## 2025-07-15 RX ORDER — PREDNISONE 20 MG/1
50 TABLET ORAL 2 TIMES DAILY
Qty: 25 TABLET | Refills: 0 | Status: SHIPPED | OUTPATIENT
Start: 2025-07-15 | End: 2025-07-20

## 2025-07-15 RX ORDER — FLUCONAZOLE 150 MG/1
150 TABLET ORAL ONCE
Qty: 1 TABLET | Refills: 0 | Status: SHIPPED | OUTPATIENT
Start: 2025-07-15 | End: 2025-07-15

## 2025-07-15 RX ORDER — VIBEGRON 75 MG/1
75 TABLET, FILM COATED ORAL DAILY
COMMUNITY

## 2025-07-15 NOTE — PROGRESS NOTES
7/15/2025   Patient Status: Established patient  Karmen Leonardo (: 1970) is a 55 y.o. female, New patient, here for evaluation of the following chief complaint(s):  Rib Pain (Patient c/o with upper rib pain getting worse x 1 week.)          Assessment & Plan  Rib pain   See below.    Unclear etiology but unsure if related to chronic cough and recent increase in wheezing. Will treat exacerbation and pain with prednisone burst.  Strict ER precautions were discussed such as sudden worsening shortness of breath or severe pain or any other symptoms or concern. The patient verbalized understanding and is in agreement with the plan.    Orders:    XR RIBS RIGHT INCLUDE CHEST (MIN 3 VIEWS); Future    predniSONE (DELTASONE) 20 MG tablet; Take 2.5 tablets by mouth 2 times daily for 5 days    Wheezing   Vital signs stable  Patient alert and oriented x3  In no apparent distress    Symptoms are consistent with COPD exacerbation. I considered other causes of cough to include bronchitis, pulmonary edema, CHF, asthma, sinusitis, pneumonia, allergic rhinitis, medication induced cough from ACE inhibitors and GERD.  Patient is afebrile with clear lung auscultation in all fields.  Patient denies any shortness of breath.      -Starting steroid, reviewed potential side effects    Treatment plan to include reduction of exposure to irritants and increased humidification in the home.  Home remedies to include honey, throat lozenges and hot tea.  Recommended antihistamines, decongestants, nasal saline spray and antitussive agents along with Vicks Vaporub on feet at night time.  Educational information provided to patient. ER precautions discussed and the patient verbalized understanding.    Follow up with PCP in two weeks if no improvement.     Orders:    XR RIBS RIGHT INCLUDE CHEST (MIN 3 VIEWS); Future    predniSONE (DELTASONE) 20 MG tablet; Take 2.5 tablets by mouth 2 times daily for 5 days    Candida infection   Patient

## 2025-07-15 NOTE — PATIENT INSTRUCTIONS
can worsen congestion.  Cough suppressants/expectorants: These may help with associated cough, but consult with a healthcare provider, especially for children, as they are not always recommended.  Honey: For adults and children over 1 year old, honey can help soothe a sore throat and cough.    Please see emergent care for uncontrolled fevers above 100.4, shortness of breath, difficulty breathing, difficulty swallowing, chest pain.

## 2025-08-20 RX ORDER — CYCLOBENZAPRINE HCL 10 MG
10 TABLET ORAL 2 TIMES DAILY PRN
Qty: 30 TABLET | Refills: 0 | Status: SHIPPED | OUTPATIENT
Start: 2025-08-20

## 2025-08-26 DIAGNOSIS — E03.9 HYPOTHYROIDISM, UNSPECIFIED: ICD-10-CM

## 2025-08-26 RX ORDER — ATORVASTATIN CALCIUM 20 MG/1
20 TABLET, FILM COATED ORAL DAILY
Qty: 90 TABLET | Refills: 0 | Status: SHIPPED | OUTPATIENT
Start: 2025-08-26

## 2025-08-26 RX ORDER — LEVOTHYROXINE SODIUM 125 UG/1
125 TABLET ORAL
Qty: 90 TABLET | Refills: 0 | Status: SHIPPED | OUTPATIENT
Start: 2025-08-26

## 2025-09-02 RX ORDER — CYCLOBENZAPRINE HCL 10 MG
10 TABLET ORAL 2 TIMES DAILY PRN
Qty: 30 TABLET | Refills: 0 | Status: SHIPPED | OUTPATIENT
Start: 2025-09-02

## (undated) DEVICE — STERILE POLYISOPRENE POWDER-FREE SURGICAL GLOVES: Brand: PROTEXIS

## (undated) DEVICE — SOLUTION IV 1000ML 0.9% SOD CHL

## (undated) DEVICE — SUTURE VCRL UD BR CT 3-0 18IN CT1 J838D

## (undated) DEVICE — TOOL 14MH30 LEGEND 14CM 3MM: Brand: MIDAS REX ™

## (undated) DEVICE — 3M™ WARMING BLANKET, LOWER BODY, 10 PER CASE, 42568: Brand: BAIR HUGGER™

## (undated) DEVICE — CASPAR DISTR PIN12MMSTER: Brand: AESCULAP

## (undated) DEVICE — BIPOLAR FORCEPS CORD,BANANA LEADS: Brand: VALLEYLAB

## (undated) DEVICE — PREP CHLORAPREP 10.5 ML ORG --

## (undated) DEVICE — STAPLER SKIN 35CT WD STRL DISP -- MULTIFIRE PREMIUM

## (undated) DEVICE — TRAY CATH 16F DRN BG LTX -- CONVERT TO ITEM 363158

## (undated) DEVICE — INFECTION CONTROL KIT SYS

## (undated) DEVICE — FLOSEAL MATRIX IS INDICATED IN SURGICAL PROCEDURES (OTHER THAN IN OPHTHALMIC) AS AN ADJUNCT TO HEMOSTASIS WHEN CONTROL OF BLEEDING BY LIGATURE OR CONVENTIONAL PROCEDURES IS INEFFECTIVE OR IMPRACTICAL.: Brand: FLOSEAL HEMOSTATIC MATRIX

## (undated) DEVICE — HANDLE LT SNAP ON ULT DURABLE LENS FOR TRUMPF ALC DISPOSABLE

## (undated) DEVICE — DRESSING N ADH W3XL4IN NONWOVEN

## (undated) DEVICE — SPONGE: SPECIALTY PEANUT XR 100/CS: Brand: MEDICAL ACTION INDUSTRIES

## (undated) DEVICE — SYSTEM SKIN CLSR 22CM DERMBND PRINEO

## (undated) DEVICE — TOWEL SURG W17XL27IN STD BLU COT NONFENESTRATED PREWASHED

## (undated) DEVICE — STERILE POLYISOPRENE POWDER-FREE SURGICAL GLOVES WITH EMOLLIENT COATING: Brand: PROTEXIS

## (undated) DEVICE — SOLUTION IRRIG 1000ML H2O STRL BLT

## (undated) DEVICE — SYRINGE MED 20ML STD CLR PLAS LUERLOCK TIP N CTRL DISP

## (undated) DEVICE — SOLUTION IRRIG 3000ML 0.9% SOD CHL FLX CONT 0797208] ICU MEDICAL INC]

## (undated) DEVICE — MEDI-VAC NON-CONDUCTIVE SUCTION TUBING: Brand: CARDINAL HEALTH

## (undated) DEVICE — MAGNETIC INSTRUMENT PAD 10" X 16"; MEDIUM; DISPOSABLE: Brand: CARDINAL HEALTH

## (undated) DEVICE — COVER,TABLE,60X90,STERILE: Brand: MEDLINE

## (undated) DEVICE — INSULATED BLADE ELECTRODE: Brand: EDGE

## (undated) DEVICE — BONE WAX WHITE: Brand: BONE WAX WHITE

## (undated) DEVICE — FLOSEAL MATRIX IS INDICATED IN SURGICAL PROCEDURES (OTHER THAN IN OPHTHALMIC) AS AN ADJUNCT TO HEMOSTASIS WHEN CONTROL OF BLEEDING BY LIGATURE OR CONVENTIONALPROCEDURES IS INEFFECTIVE OR IMPRACTICAL.: Brand: FLOSEAL HEMOSTATIC MATRIX

## (undated) DEVICE — Device

## (undated) DEVICE — Z CONVERTED USE 2107985 COVER FLROSCP W36XL28IN 4 SIDE ADH

## (undated) DEVICE — SUTURE PERMAHAND SZ 2-0 L30IN NONABSORBABLE BLK SILK W/O A305H

## (undated) DEVICE — TUBING IRRIG L77IN DIA0.241IN L BOR FOR CYSTO W/ NVENT

## (undated) DEVICE — Z DISCONTINUED USE 2717541 SUTURE STRATAFIX SZ 3-0 L30CM NONABSORBABLE UD L26MM FS 3/8

## (undated) DEVICE — LAMINECTOMY RICHMOND-LF: Brand: MEDLINE INDUSTRIES, INC.

## (undated) DEVICE — GOWN,SIRUS,NONRNF,SETINSLV,2XL,18/CS: Brand: MEDLINE

## (undated) DEVICE — DRAPE,LAPAROTOMY,PCH,STERILE: Brand: MEDLINE

## (undated) DEVICE — DRAIN SURG 10FR SIL RND HUBLESS W/ 1/8IN TRCR BLAK

## (undated) DEVICE — GAUZE SPONGES,12 PLY: Brand: CURITY

## (undated) DEVICE — HALTER TRACTION HD W/ TRI COTTON LINING FOAM LTX

## (undated) DEVICE — COVER,MAYO STAND,STERILE: Brand: MEDLINE

## (undated) DEVICE — DRSG PATCH ANTIMIC 1INX4.0MM -- CONVERT TO ITEM 356053

## (undated) DEVICE — 12MM DRILL BIT

## (undated) DEVICE — KENDALL SCD EXPRESS SLEEVES, KNEE LENGTH, MEDIUM: Brand: KENDALL SCD

## (undated) DEVICE — SUTURE VCRL SZ 2-0 L18IN ABSRB UD CT-1 L36MM 1/2 CIR J839D

## (undated) DEVICE — SUT PROL 6-0 18IN RB2 DA BLU --

## (undated) DEVICE — 3M™ STERI-DRAPE™ INSTRUMENT POUCH 1018: Brand: STERI-DRAPE™

## (undated) DEVICE — DRAPE MICSCP W20XL64IN CLR LENS WECK FOR ZEISS OPMI

## (undated) DEVICE — 1200 GUARD II KIT W/5MM TUBE W/O VAC TUBE: Brand: GUARDIAN